# Patient Record
Sex: FEMALE | Race: BLACK OR AFRICAN AMERICAN | Employment: FULL TIME | RURAL
[De-identification: names, ages, dates, MRNs, and addresses within clinical notes are randomized per-mention and may not be internally consistent; named-entity substitution may affect disease eponyms.]

---

## 2017-01-04 ENCOUNTER — OFFICE VISIT (OUTPATIENT)
Dept: FAMILY MEDICINE CLINIC | Age: 37
End: 2017-01-04

## 2017-01-04 VITALS
BODY MASS INDEX: 47.53 KG/M2 | HEIGHT: 64 IN | DIASTOLIC BLOOD PRESSURE: 100 MMHG | TEMPERATURE: 98 F | HEART RATE: 81 BPM | SYSTOLIC BLOOD PRESSURE: 150 MMHG | WEIGHT: 278.4 LBS

## 2017-01-04 DIAGNOSIS — R68.89 INFLUENZA-LIKE SYMPTOMS: ICD-10-CM

## 2017-01-04 DIAGNOSIS — J22 LOWER RESP. TRACT INFECTION: Primary | ICD-10-CM

## 2017-01-04 LAB
QUICKVUE INFLUENZA TEST: NEGATIVE
VALID INTERNAL CONTROL?: YES

## 2017-01-04 RX ORDER — LEVOFLOXACIN 500 MG/1
500 TABLET, FILM COATED ORAL DAILY
Qty: 10 TAB | Refills: 0 | Status: SHIPPED | OUTPATIENT
Start: 2017-01-04 | End: 2017-01-14

## 2017-01-04 RX ORDER — CODEINE PHOSPHATE AND GUAIFENESIN 10; 100 MG/5ML; MG/5ML
SOLUTION ORAL
Qty: 180 ML | Refills: 0 | Status: SHIPPED | OUTPATIENT
Start: 2017-01-04 | End: 2017-08-07 | Stop reason: CLARIF

## 2017-01-04 NOTE — MR AVS SNAPSHOT
Visit Information Date & Time Provider Department Dept. Phone Encounter #  
 1/4/2017  1:45 PM Mariely NguyenEsthela 73 [phone] [id number] Follow-up Instructions Return if symptoms worsen or fail to improve. Upcoming Health Maintenance Date Due DTaP/Tdap/Td series (1 - Tdap) 1/23/2002 PAP AKA CERVICAL CYTOLOGY 1/23/2002 INFLUENZA AGE 9 TO ADULT 8/1/2016 Allergies as of 1/4/2017  Review Complete On: 1/4/2017 By: Mariely Nguyen MD  
  
 Severity Noted Reaction Type Reactions Black Pepper Medium 09/15/2014    Hives, Swelling, Cough Mushroom Combination No.1 Medium 09/15/2014    Shortness of Breath, Swelling, Cough Tomato Medium 09/15/2014    Hives, Swelling, Cough Adhesive Tape-silicones  24/85/0799   Topical Hives Causes burning at site Dilaudid [Hydromorphone (Bulk)]  09/16/2014    Itching Gluten Low 06/13/2013    Other (comments) GI upset Morphine Low 01/17/2011   Side Effect Rash Tramadol Low 01/17/2011   Side Effect Rash Tolerates percocet Current Immunizations  Reviewed on 9/22/2014 Name Date Influenza Vaccine PF 10/1/2014  6:06 PM  
 Pneumococcal Polysaccharide (PPSV-23) 10/1/2014  6:08 PM  
  
 Not reviewed this visit You Were Diagnosed With   
  
 Codes Comments Lower resp. tract infection    -  Primary ICD-10-CM: Lum Nones ICD-9-CM: 519.8 Influenza-like symptoms     ICD-10-CM: R68.89 ICD-9-CM: 780.99 Vitals BP Pulse Temp Height(growth percentile) Weight(growth percentile) BMI  
 (!) 150/100 (BP 1 Location: Left arm, BP Patient Position: Sitting) 81 98 °F (36.7 °C) (Temporal) 5' 4\" (1.626 m) 278 lb 6.4 oz (126.3 kg) 47.79 kg/m2 OB Status Smoking Status Unknown Never Smoker BMI and BSA Data Body Mass Index Body Surface Area 47.79 kg/m 2 2.39 m 2 Preferred Pharmacy Pharmacy Name Phone [facility]/PHARMACY #[id number] [place] [phone] Your Updated Medication List  
  
   
This list is accurate as of: 17  2:26 PM.  Always use your most recent med list.  
  
  
  
  
 Willetta Adas 250-50 mcg/dose diskus inhaler Generic drug:  fluticasone-salmeterol Take 1 Puff by inhalation every twelve (12) hours. albuterol 90 mcg/actuation inhaler Commonly known as:  PROVENTIL HFA, VENTOLIN HFA, PROAIR HFA Take 1-2 puffs by inhalation every four (4) hours as needed for Wheezing. docusate sodium 50 mg capsule Commonly known as:  Lucetta Martinez Take 2 Caps by mouth two (2) times daily as needed for Constipation. DUEXIS 800-26.6 mg Tab Generic drug:  ibuprofen-famotidine Take 1 Tab by mouth three (3) times daily as needed. guaiFENesin-codeine 100-10 mg/5 mL solution Commonly known as:  ROBITUSSIN AC  
1-2 tsp q 4 hours prn cough. (has taken before without difficulty) IMITREX 50 mg tablet Generic drug:  SUMAtriptan Take 50 mg by mouth once as needed for Migraine. levoFLOXacin 500 mg tablet Commonly known as:  Lola Hocking Take 1 Tab by mouth daily for 10 days. LINZESS 290 mcg Cap capsule Generic drug:  linaclotide Take 290 mcg by mouth Daily (before breakfast). LYRICA 50 mg capsule Generic drug:  pregabalin Take 50 mg by mouth two (2) times a day. MULTIVITAMIN PO Take  by mouth. Takes one tab po occasionally. PEPCID 40 mg tablet Generic drug:  famotidine Take 40 mg by mouth nightly. PROTONIX 40 mg tablet Generic drug:  pantoprazole Take 40 mg by mouth two (2) times a day. SINGULAIR 10 mg tablet Generic drug:  montelukast  
Take 10 mg by mouth nightly. Prescriptions Printed Refills  
 guaiFENesin-codeine (ROBITUSSIN AC) 100-10 mg/5 mL solution 0 Si-2 tsp q 4 hours prn cough. (has taken before without difficulty) Class: Print Prescriptions Sent to Pharmacy Refills  
 levoFLOXacin (LEVAQUIN) 500 mg tablet 0 Sig: Take 1 Tab by mouth daily for 10 days. Class: Normal  
 Pharmacy: [facility]/pharmacy #[id number] [facility] [address] [place]  #: [phone] Route: Oral  
  
Follow-up Instructions Return if symptoms worsen or fail to improve. Introducing [facility]! Chhaya Hammond introduces [employer] patient portal. Now you can access parts of your medical record, email your doctor's office, and request medication refills online. 1. In your internet browser, go to [email or web address] 2. Click on the First Time User? Click Here link in the Sign In box. You will see the New Member Sign Up page. 3. Enter your [employer] Access Code exactly as it appears below. You will not need to use this code after youve completed the sign-up process. If you do not sign up before the expiration date, you must request a new code. · [employer] Access Code: [email or web address] Expires: 2/19/2017  3:54 PM 
 
4. Enter the last four digits of your Social Security Number (xxxx) and Date of Birth (mm/dd/yyyy) as indicated and click Submit. You will be taken to the next sign-up page. 5. Create a [employer] ID. This will be your [employer] login ID and cannot be changed, so think of one that is secure and easy to remember. 6. Create a [employer] password. You can change your password at any time. 7. Enter your Password Reset Question and Answer. This can be used at a later time if you forget your password. 8. Enter your e-mail address. You will receive e-mail notification when new information is available in [address] 9. Click Sign Up. You can now view and download portions of your medical record. 10. Click the Download Summary menu link to download a portable copy of your medical information.  
 
If you have questions, please visit the Frequently Asked Questions section of the [employer]. Remember, [employer] is NOT to be used for urgent needs. For medical emergencies, dial 911. Now available from your iPhone and Android! Please provide this summary of care documentation to your next provider. Your primary care clinician is listed as Pilar Suero. Lio GOLDEN. If you have any questions after today's visit, please call [phone].

## 2017-01-04 NOTE — PROGRESS NOTES
Subjective:  Mey Osborn presents with cough congestion she has had for the last 2 weeks cough now productive green sputum. Does have a history of asthma no significant flare has been well controlled on her inhalers. Over the last 2 days has noted fever chills myalgias. Has had a flu shot. No known exposure to the flu  Appetite is decreased taking fluids well. No nausea vomiting diarrhea  Denies any respiratory distress COPD or tobacco use  No family members ill      Problem list reviewed as part of this encounter. Past Medical History   Diagnosis Date    Acquired hypothyroidism      hyperthyroid    Anemia NEC     Arthritis     Asthma     CHF (congestive heart failure) (HCC)     Chronic pain      fibromyalgia    Colitis     GERD (gastroesophageal reflux disease)     Ill-defined condition      ACL repair on L leg     Other unknown and unspecified cause of morbidity or mortality      fibromyalgia    Other unknown and unspecified cause of morbidity or mortality      obesity    Pleural effusion associated with pulmonary infection     Psychiatric problem      anxiety    PUD (peptic ulcer disease)       Medication list reviewed and updated. Review of Systems -as per the above in previous documentation  Objective:  Visit Vitals    BP (!) 150/100 (BP 1 Location: Left arm, BP Patient Position: Sitting)    Pulse 81    Temp 98 °F (36.7 °C) (Temporal)    Ht 5' 4\" (1.626 m)    Wt 278 lb 6.4 oz (126.3 kg)    BMI 47.79 kg/m2     Alert and oriented. No acute distress. HEENT Ears TMs are normal.  Canals are clear. Eyes pupils equal, round, react to light and accommodation. Extraocular movements intact. Nose patent. Mouth and throat is clear. Neck supple full range of motion no thyromegaly. No carotid bruits. No significant lymphadenopathy  Lungs clear to auscultation without wheezes, rales, soft rhonchi scattered.   No respiratory distress accessory muscle use or tachypnea  Heart  RRR,  S1 & S2 are normal intensity. No murmur; no gallop  Abdomen bowel sounds active. No tenderness, guarding, rebound, masses, organomegaly. Back no CVA tenderness. Extremities without clubbing, cyanosis, or edema  Neuro HMF intact. Motor is 5 over 5 and symmetrical.  Deep tendon reflexes +2 equal    Influenza test is negative    Assessment:    ICD-10-CM ICD-9-CM    1. Lower resp. tract infection J22 519.8    2. Influenza-like symptoms R68.89 780.99              Plan:  See orders. Orders Placed This Encounter    levoFLOXacin (LEVAQUIN) 500 mg tablet     Sig: Take 1 Tab by mouth daily for 10 days. Dispense:  10 Tab     Refill:  0    guaiFENesin-codeine (ROBITUSSIN AC) 100-10 mg/5 mL solution     Si-2 tsp q 4 hours prn cough. (has taken before without difficulty)     Dispense:  180 mL     Refill:  0    fluids rest acetaminophen ibuprofen as needed, medications as ordered  Follow up if symptoms worsen, change, fail to improve or any new symptoms develop. There are no Patient Instructions on file for this visit.     (Please note that portions of this note were completed with a voice recognition program. Efforts were made to edit the dictations but occasionally words are mis-transcribed.)

## 2017-02-21 ENCOUNTER — TELEPHONE (OUTPATIENT)
Dept: FAMILY MEDICINE CLINIC | Age: 37
End: 2017-02-21

## 2017-02-21 RX ORDER — IPRATROPIUM BROMIDE 0.5 MG/2.5ML
0.5 SOLUTION RESPIRATORY (INHALATION) AS NEEDED
Qty: 2.5 ML | Refills: 11
Start: 2017-02-21

## 2017-02-21 NOTE — TELEPHONE ENCOUNTER
Spoke with patient. Breathing about same. Last dose of prednisone today. Per Mallory Lubin can call in another medication. Patient uses [facility] pharmacy.

## 2017-02-21 NOTE — TELEPHONE ENCOUNTER
Called and SW patient and she stated that her Chest is now starting to tighten back up again. She was put on Prednisone, Tessalon Pearls and to use her Albuterol Inhaler Q 4 hours. She didn't know if she could just have something else called in to take.

## 2017-02-22 NOTE — TELEPHONE ENCOUNTER
Spoke with Marina feeling a little better . Discussed doing neb treatment and cough medicine prior to going to bed to decrease nocturnal coughing.

## 2017-05-18 ENCOUNTER — OFFICE VISIT (OUTPATIENT)
Dept: FAMILY MEDICINE CLINIC | Age: 37
End: 2017-05-18

## 2017-05-18 VITALS
RESPIRATION RATE: 22 BRPM | HEART RATE: 84 BPM | OXYGEN SATURATION: 98 % | DIASTOLIC BLOOD PRESSURE: 60 MMHG | SYSTOLIC BLOOD PRESSURE: 116 MMHG | TEMPERATURE: 97.8 F | BODY MASS INDEX: 46.92 KG/M2 | WEIGHT: 274.8 LBS | HEIGHT: 64 IN

## 2017-05-18 DIAGNOSIS — J02.9 SORE THROAT: Primary | ICD-10-CM

## 2017-05-18 DIAGNOSIS — G25.81 RESTLESS LEG SYNDROME: ICD-10-CM

## 2017-05-18 DIAGNOSIS — J03.90 TONSILLITIS: ICD-10-CM

## 2017-05-18 LAB
S PYO AG THROAT QL: NEGATIVE
VALID INTERNAL CONTROL?: YES

## 2017-05-18 RX ORDER — METHYLPREDNISOLONE 4 MG/1
TABLET ORAL
Qty: 21 TAB | Refills: 0 | Status: SHIPPED | OUTPATIENT
Start: 2017-05-18 | End: 2017-07-11

## 2017-05-18 RX ORDER — AZITHROMYCIN 250 MG/1
250 TABLET, FILM COATED ORAL DAILY
Qty: 6 TAB | Refills: 0 | Status: SHIPPED | OUTPATIENT
Start: 2017-05-18 | End: 2017-07-11

## 2017-05-18 RX ORDER — PREGABALIN 50 MG/1
50 CAPSULE ORAL DAILY
Qty: 30 CAP | Refills: 2 | Status: SHIPPED | OUTPATIENT
Start: 2017-05-18 | End: 2017-05-18 | Stop reason: CLARIF

## 2017-05-18 NOTE — MR AVS SNAPSHOT
Visit Information Date & Time Provider Department Dept. Phone Encounter #  
 5/18/2017  9:30 AM Csomo Osborn NP [address] [phone] [id number] Follow-up Instructions Return in about 3 months (around 8/18/2017). Upcoming Health Maintenance Date Due DTaP/Tdap/Td series (1 - Tdap) 1/23/2002 PAP AKA CERVICAL CYTOLOGY 1/23/2002 INFLUENZA AGE 9 TO ADULT 8/1/2017 Allergies as of 5/18/2017  Review Complete On: 5/18/2017 By: Sol Jaime RN Severity Noted Reaction Type Reactions Black Pepper Medium 09/15/2014    Hives, Swelling, Cough Mushroom Combination No.1 Medium 09/15/2014    Shortness of Breath, Swelling, Cough Tomato Medium 09/15/2014    Hives, Swelling, Cough Adhesive Tape-silicones  04/80/2378   Topical Hives Causes burning at site Dilaudid [Hydromorphone (Bulk)]  09/16/2014    Itching Gluten Low 06/13/2013    Other (comments) GI upset Morphine Low 01/17/2011   Side Effect Rash Tramadol Low 01/17/2011   Side Effect Rash Tolerates percocet Current Immunizations  Reviewed on 9/22/2014 Name Date Influenza Vaccine PF 10/1/2014  6:06 PM  
 Pneumococcal Polysaccharide (PPSV-23) 10/1/2014  6:08 PM  
  
 Not reviewed this visit You Were Diagnosed With   
  
 Codes Comments Sore throat    -  Primary ICD-10-CM: J02.9 ICD-9-CM: 218 Tonsillitis     ICD-10-CM: J03.90 ICD-9-CM: 258 Vitals BP Pulse Temp Resp Height(growth percentile) 116/60 (BP 1 Location: Left arm, BP Patient Position: Sitting) 84 97.8 °F (36.6 °C) (Temporal) 22 5' 4\" (1.626 m) Weight(growth percentile) SpO2 BMI OB Status Smoking Status 274 lb 12.8 oz (124.6 kg) 98% 47.17 kg/m2 Unknown Never Smoker Vitals History BMI and BSA Data Body Mass Index Body Surface Area  
 47.17 kg/m 2 2.37 m 2 Preferred Pharmacy Pharmacy Name Phone [facility]/PHARMACY #[id number] [place] [phone] Your Updated Medication List  
  
   
This list is accurate as of: 5/18/17 10:57 AM.  Always use your most recent med list.  
  
  
  
  
 Samella Meter 250-50 mcg/dose diskus inhaler Generic drug:  fluticasone-salmeterol Take 1 Puff by inhalation every twelve (12) hours. albuterol 90 mcg/actuation inhaler Commonly known as:  PROVENTIL HFA, VENTOLIN HFA, PROAIR HFA Take 1-2 puffs by inhalation every four (4) hours as needed for Wheezing. azithromycin 250 mg tablet Commonly known as:  [place] Clive Take 1 Tab by mouth daily. 2 today then 1 daily x 4 days  
  
 docusate sodium 50 mg capsule Commonly known as:  Germaine Ehrich Take 2 Caps by mouth two (2) times daily as needed for Constipation. DUEXIS 800-26.6 mg Tab Generic drug:  ibuprofen-famotidine Take 1 Tab by mouth three (3) times daily as needed. guaiFENesin-codeine 100-10 mg/5 mL solution Commonly known as:  ROBITUSSIN AC  
1-2 tsp q 4 hours prn cough. (has taken before without difficulty) IMITREX 50 mg tablet Generic drug:  SUMAtriptan Take 50 mg by mouth once as needed for Migraine. ipratropium 0.02 % nebulizer solution Commonly known as:  ATROVENT  
2.5 mL by Nebulization route as needed for Wheezing. linaclotide 290 mcg Cap capsule Commonly known as:  Chance Ramirez Take 1 Cap by mouth Daily (before breakfast). methylPREDNISolone 4 mg Tab Commonly known as:  MEDROL Take 5 tablets day one, take 4 tablets day two, take 3 tablets day three,take 2 tablets day four, take 1 tablet day five. MULTIVITAMIN PO Take  by mouth. Takes one tab po occasionally. PEPCID 40 mg tablet Generic drug:  famotidine Take 40 mg by mouth nightly. pregabalin 50 mg capsule Commonly known as:  Enedina Page Take 1 Cap by mouth daily. Max Daily Amount: 50 mg. PROTONIX 40 mg tablet Generic drug:  pantoprazole Take 40 mg by mouth two (2) times a day. SINGULAIR 10 mg tablet Generic drug:  montelukast  
Take 10 mg by mouth nightly. Prescriptions Printed Refills  
 pregabalin (LYRICA) 50 mg capsule 2 Sig: Take 1 Cap by mouth daily. Max Daily Amount: 50 mg.  
 Class: Print Route: Oral  
  
Prescriptions Sent to Pharmacy Refills  
 linaclotide (LINZESS) 290 mcg cap capsule 2 Sig: Take 1 Cap by mouth Daily (before breakfast). Class: Normal  
 Pharmacy: [facility]/pharmacy #[id number] Denny Ventura, [address] Ph #: [phone] Route: Oral  
 methylPREDNISolone (MEDROL) 4 mg tab 0 Sig: Take 5 tablets day one, take 4 tablets day two, take 3 tablets day three,take 2 tablets day four, take 1 tablet day five. Class: Normal  
 Pharmacy: [facility]/pharmacy #[id number] Denny Ventura, [address] Ph #: [phone]  
 azithromycin (ZITHROMAX) 250 mg tablet 0 Sig: Take 1 Tab by mouth daily. 2 today then 1 daily x 4 days Class: Normal  
 Pharmacy: [facility]/pharmacy #[id number] Denny Ventura [address] Ph #: [phone] Route: Oral  
  
We Performed the Following AMB POC RAPID STREP A [63867 CPT(R)] Follow-up Instructions Return in about 3 months (around 8/18/2017). Introducing [facility]! [facility] introduces [employer] patient portal. Now you can access parts of your medical record, email your doctor's office, and request medication refills online. 1. In your internet browser, go to [email or web address] 2. Click on the First Time User? Click Here link in the Sign In box. You will see the New Member Sign Up page. 3. Enter your [employer] Access Code exactly as it appears below. You will not need to use this code after youve completed the sign-up process. If you do not sign up before the expiration date, you must request a new code. · [employer] Access Code: [email or web address] Expires: 8/16/2017 10:57 AM 
 
4. Enter the last four digits of your Social Security Number (xxxx) and Date of Birth (mm/dd/yyyy) as indicated and click Submit. You will be taken to the next sign-up page. 5. Create a [employer] ID. This will be your [employer] login ID and cannot be changed, so think of one that is secure and easy to remember. 6. Create a [employer] password. You can change your password at any time. 7. Enter your Password Reset Question and Answer. This can be used at a later time if you forget your password. 8. Enter your e-mail address. You will receive e-mail notification when new information is available in [address] 9. Click Sign Up. You can now view and download portions of your medical record. 10. Click the Download Summary menu link to download a portable copy of your medical information. If you have questions, please visit the Frequently Asked Questions section of the [employer] website. Remember, [employer] is NOT to be used for urgent needs. For medical emergencies, dial 911. Now available from your iPhone and Android! Please provide this summary of care documentation to your next provider. Your primary care clinician is listed as Natty Stringer. If you have any questions after today's visit, please call [phone].

## 2017-05-22 PROBLEM — G25.81 RESTLESS LEG SYNDROME: Status: ACTIVE | Noted: 2017-05-22

## 2017-05-22 RX ORDER — GABAPENTIN 100 MG/1
100 CAPSULE ORAL DAILY
Qty: 30 CAP | Refills: 2 | Status: SHIPPED | OUTPATIENT
Start: 2017-05-22 | End: 2017-08-07 | Stop reason: CLARIF

## 2017-05-22 NOTE — PROGRESS NOTES
Attends [facility] for RN      Addendum to PE  Present during exam  Mouth and throat  Anterior Cervical Lymphadenopathy. Plan : BMI 47.17 discussed follow up for weight loss decrease calorie intake increase activity. Restless leg syndrome responding to gabapentin . Plan to assess for peripheral neuropathy next visit     RTO in 3 months or sooner as needed.      Darron Perez NP-C

## 2017-05-22 NOTE — PROGRESS NOTES
Subjective:     Worthy Apgar is a 40 y.o. female who presents today with the following:  Chief Complaint   Patient presents with    Sore Throat    Cold Symptoms   Colletta Lobe presents today for an acute visit. Daughter ill first with sore throat. Marina sick x 5 days with fever 100.1 x 5 days. Sore throat hurts to swallow. Stuffy nose and head congestion. Trying OTC Robitussin, mucinex and tylenol. Asthma: Using rescue  inhaler daily for wheezing x 5 days     Constipation: Linzess effective with diet and fluid intake. Peripheral neuropathy: primarily bottom of feet. Gabapentin effective able to exercise and increase activity. ROS:  Gen: denies fever, chills, fatigue, weight loss, weight gain  HEENT:denies blurry vision, nasal congestion, sore throat  Resp: denies dypsnea, cough, wheezing  CV: denies chest pain radiating to the jaws or arms, palpitations, lower extremity edema  Abd: denies nausea, vomiting, diarrhea, constipation  Neuro: denies numbness/tingling  Endo: denies polyuria, polydipsia, heat/cold intolerance  Heme: no lymphadenopathy    Allergies   Allergen Reactions    Black Pepper Hives, Swelling and Cough    Mushroom Combination No.1 Shortness of Breath, Swelling and Cough    Tomato Hives, Swelling and Cough    Adhesive Tape-Silicones Hives     Causes burning at site     Dilaudid [Hydromorphone (Bulk)] Itching    Gluten Other (comments)     GI upset    Morphine Rash    Tramadol Rash     Tolerates percocet         Current Outpatient Prescriptions:     linaclotide (LINZESS) 290 mcg cap capsule, Take 1 Cap by mouth Daily (before breakfast). , Disp: 30 Cap, Rfl: 2    methylPREDNISolone (MEDROL) 4 mg tab, Take 5 tablets day one, take 4 tablets day two, take 3 tablets day three,take 2 tablets day four, take 1 tablet day five., Disp: 21 Tab, Rfl: 0    azithromycin (ZITHROMAX) 250 mg tablet, Take 1 Tab by mouth daily.  2 today then 1 daily x 4 days, Disp: 6 Tab, Rfl: 0   ipratropium (ATROVENT) 0.02 % nebulizer solution, 2.5 mL by Nebulization route as needed for Wheezing., Disp: 2.5 mL, Rfl: 11    pantoprazole (PROTONIX) 40 mg tablet, Take 40 mg by mouth two (2) times a day., Disp: , Rfl:     ibuprofen-famotidine (DUEXIS) 800-26.6 mg tab, Take 1 Tab by mouth three (3) times daily as needed. , Disp: , Rfl:     MULTIVITAMIN PO, Take  by mouth. Takes one tab po occasionally. , Disp: , Rfl:     fluticasone-salmeterol (ADVAIR DISKUS) 250-50 mcg/dose diskus inhaler, Take 1 Puff by inhalation every twelve (12) hours. , Disp: , Rfl:     SUMAtriptan (IMITREX) 50 mg tablet, Take 50 mg by mouth once as needed for Migraine. , Disp: , Rfl:     docusate sodium (COLACE) 50 mg capsule, Take 2 Caps by mouth two (2) times daily as needed for Constipation. , Disp: 30 Cap, Rfl: 2    albuterol (PROVENTIL HFA, VENTOLIN HFA) 90 mcg/actuation inhaler, Take 1-2 puffs by inhalation every four (4) hours as needed for Wheezing., Disp: , Rfl:     montelukast (SINGULAIR) 10 mg tablet, Take 10 mg by mouth nightly., Disp: , Rfl:     famotidine (PEPCID) 40 mg tablet, Take 40 mg by mouth nightly., Disp: , Rfl:     guaiFENesin-codeine (ROBITUSSIN AC) 100-10 mg/5 mL solution, 1-2 tsp q 4 hours prn cough. (has taken before without difficulty), Disp: 180 mL, Rfl: 0    Past Medical History:   Diagnosis Date    Acquired hypothyroidism     hyperthyroid    Anemia NEC     Arthritis     Asthma     CHF (congestive heart failure) (HCC)     Chronic pain     fibromyalgia    Colitis     GERD (gastroesophageal reflux disease)     Ill-defined condition     ACL repair on L leg     Other unknown and unspecified cause of morbidity or mortality     fibromyalgia    Other unknown and unspecified cause of morbidity or mortality     obesity    Pleural effusion associated with pulmonary infection     Psychiatric problem     anxiety    PUD (peptic ulcer disease)        Past Surgical History:   Procedure Laterality Date     DELIVERY ONLY      x2, one with classical uterine incision    HX  SECTION      x3 total    HX CHOLECYSTECTOMY      HX HEENT      sinus surgery    HX HERNIA REPAIR      HX ORTHOPAEDIC      ACL repair x2 - twice       History   Smoking Status    Never Smoker   Smokeless Tobacco    Never Used       Social History     Social History    Marital status:      Spouse name: N/A    Number of children: N/A    Years of education: N/A     Social History Main Topics    Smoking status: Never Smoker    Smokeless tobacco: Never Used    Alcohol use No    Drug use: No    Sexual activity: Yes     Partners: Male     Birth control/ protection: None     Other Topics Concern    None     Social History Narrative       Family History   Problem Relation Age of Onset    Diabetes Father     Heart Attack Father      62 smoker    Hypertension Father     Asthma Father     High Cholesterol Father     Stroke Father     Rashes/Skin Problems Father      eczema    Heart Disease Father     Diabetes Brother     Diabetes Sister     Other Sister      ankylosing spondylitis    Hypertension Mother     Asthma Mother     Hypertension Sister     Other Sister      ankylosing spondylitis    Hypertension Brother     Hypertension Maternal Grandmother     Stroke Maternal Grandmother     Heart Disease Maternal Grandfather     Diabetes Paternal Grandmother     Dementia Paternal Grandfather     Asthma Daughter     Asthma Son     Asthma Son     Asthma Son     Asthma Son     Asthma Son          Objective:     Visit Vitals    /60 (BP 1 Location: Left arm, BP Patient Position: Sitting)    Pulse 84    Temp 97.8 °F (36.6 °C) (Temporal)    Resp 22    Ht 5' 4\" (1.626 m)    Wt 274 lb 12.8 oz (124.6 kg)    SpO2 98%    BMI 47.17 kg/m2     Body mass index is 47.17 kg/(m^2). General: Alert and oriented. No acute distress. Well nourished, Obesity  HEENT :  Ears:TMs are normal. Canals are clear. Eyes: pupils equal, round, react to light and accommodation. Extra ocular movements intact. Nose: patent. Mouth and throat erythematous, 3+ tonsils no exudate. Neck:supple full range of motion no thyromegaly. Trachea midline, No carotid bruits. No significant lymphadenopathy  Lungs[de-identified] clear to auscultation without wheezes, rales, or rhonchi. Heart :RRR, S1 & S2 are normal intensity. No murmur; no gallop  Abdomen: bowel sounds active. No tenderness, guarding, rebound, masses, hepatic or spleen enlargement  Back: no CVA tenderness. Extremities: without clubbing, cyanosis, or edema  Pulses: radial and femoral pulses are normal  Neuro: HMF intact. Cranial nerves II through XII grossly normal.  Motor: is 5 over 5 and symmetrical.   Deep tendon reflexes: +2 equal    Results for orders placed or performed in visit on 05/18/17   AMB POC RAPID STREP A   Result Value Ref Range    VALID INTERNAL CONTROL POC Yes     Group A Strep Ag Negative Negative       Results for orders placed or performed in visit on 05/18/17   AMB POC RAPID STREP A   Result Value Ref Range    VALID INTERNAL CONTROL POC Yes     Group A Strep Ag Negative Negative       Assessment/ Plan:     Benjamin Mclean was seen today for sore throat and cold symptoms. Diagnoses and all orders for this visit:    Sore throat  -     AMB POC RAPID STREP A    Tonsillitis    Other orders  -     linaclotide (LINZESS) 290 mcg cap capsule; Take 1 Cap by mouth Daily (before breakfast). -     Discontinue: pregabalin (LYRICA) 50 mg capsule; Take 1 Cap by mouth daily. Max Daily Amount: 50 mg.  -     methylPREDNISolone (MEDROL) 4 mg tab; Take 5 tablets day one, take 4 tablets day two, take 3 tablets day three,take 2 tablets day four, take 1 tablet day five. -     azithromycin (ZITHROMAX) 250 mg tablet; Take 1 Tab by mouth daily. 2 today then 1 daily x 4 days  -     gabapentin (NEURONTIN) 100 mg capsule; Take 1 Cap by mouth daily. 1. Sore throat    2. Tonsillitis    3. Constipation  4. peripheral neuropathy    Orders Placed This Encounter    AMB POC RAPID STREP A    linaclotide (LINZESS) 290 mcg cap capsule     Sig: Take 1 Cap by mouth Daily (before breakfast). Dispense:  30 Cap     Refill:  2    DISCONTD: pregabalin (LYRICA) 50 mg capsule     Sig: Take 1 Cap by mouth daily. Max Daily Amount: 50 mg. Dispense:  30 Cap     Refill:  2    methylPREDNISolone (MEDROL) 4 mg tab     Sig: Take 5 tablets day one, take 4 tablets day two, take 3 tablets day three,take 2 tablets day four, take 1 tablet day five. Dispense:  21 Tab     Refill:  0    azithromycin (ZITHROMAX) 250 mg tablet     Sig: Take 1 Tab by mouth daily. 2 today then 1 daily x 4 days     Dispense:  6 Tab     Refill:  0         Verbal and written instructions (see AVS) provided.  Patient expresses understanding of diagnosis and treatment plan.     NENA Oneil

## 2017-07-11 ENCOUNTER — OFFICE VISIT (OUTPATIENT)
Dept: FAMILY MEDICINE CLINIC | Age: 37
End: 2017-07-11

## 2017-07-11 VITALS
RESPIRATION RATE: 16 BRPM | DIASTOLIC BLOOD PRESSURE: 112 MMHG | OXYGEN SATURATION: 95 % | WEIGHT: 277.2 LBS | BODY MASS INDEX: 47.33 KG/M2 | SYSTOLIC BLOOD PRESSURE: 138 MMHG | HEIGHT: 64 IN | HEART RATE: 88 BPM | TEMPERATURE: 98.4 F

## 2017-07-11 DIAGNOSIS — J02.9 SORE THROAT: ICD-10-CM

## 2017-07-11 DIAGNOSIS — J45.909 ASTHMA, INTRINSIC: ICD-10-CM
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
## 2017-07-11 DIAGNOSIS — I10 ESSENTIAL HYPERTENSION: ICD-10-CM

## 2017-07-11 DIAGNOSIS — E07.9 THYROID CONDITION: ICD-10-CM

## 2017-07-11 DIAGNOSIS — R51.9 ACUTE NONINTRACTABLE HEADACHE, UNSPECIFIED HEADACHE TYPE: Primary | ICD-10-CM

## 2017-07-11 DIAGNOSIS — G43.109 MIGRAINE WITH AURA AND WITHOUT STATUS MIGRAINOSUS, NOT INTRACTABLE: ICD-10-CM

## 2017-07-11 DIAGNOSIS — A77.0 ROCKY MOUNTAIN SPOTTED FEVER: ICD-10-CM

## 2017-07-11 LAB
GLUCOSE POC: 120 MG/DL
S PYO AG THROAT QL: NEGATIVE
VALID INTERNAL CONTROL?: YES

## 2017-07-11 RX ORDER — ZINC GLUCONATE 10 MG
LOZENGE ORAL
COMMUNITY
End: 2019-02-13

## 2017-07-11 RX ORDER — KETOROLAC TROMETHAMINE 30 MG/ML
30 INJECTION, SOLUTION INTRAMUSCULAR; INTRAVENOUS ONCE
Qty: 1 VIAL | Refills: 0
Start: 2017-07-11 | End: 2017-07-11

## 2017-07-11 RX ORDER — HYDROCHLOROTHIAZIDE 12.5 MG/1
12.5 TABLET ORAL DAILY
Qty: 30 TAB | Refills: 0 | Status: SHIPPED | OUTPATIENT
Start: 2017-07-11 | End: 2017-08-17 | Stop reason: DRUGHIGH

## 2017-07-11 RX ORDER — RIZATRIPTAN BENZOATE 5 MG/1
TABLET ORAL
Qty: 20 TAB | Refills: 0 | Status: SHIPPED | OUTPATIENT
Start: 2017-07-11 | End: 2017-08-07 | Stop reason: CLARIF

## 2017-07-11 NOTE — PROGRESS NOTES
Tricia Gallagher is a 40 y.o. female who presents to the office today with the following:  Chief Complaint   Patient presents with    Headache     nausea       HPI  Hx of migraines. HA since Sunday. Today's is worse than usual.  Talking makes her feel nauseated. Goes to bed with HA, wakes up with HA. Feels like \"head about to pop off shoulder\"  Also c/o photophobia, wearing glasses. Has pain center of face, over sinuses, in bilat ears, and back of head/top of neck. Mix of dull ache in front, sharp behind ears. Gets tingling in face also before her migraines. Tried some Excedrin migraine, Tylenol, and hydrocodone (leftover from wisdom teeth) w/o relief. Has current ST. Worse in AM, resolves throughout the day. Thinks likely from vomiting, she associates with her hiatal hernia and GERD. (has been seen by GI- did not want sx). No fever/chills, congestion/mucus, cough. No neck stiffness/RROM. Went to Urgent care ~1mo ago, Rx abx and cleared sinusitis, no such sxs since. Also admits in heat with hernia + asthma has noticed some mild RIVER- improves with inhaler, no chest pain. Also has gained 40 lbs. Thinks probably due to that also. Currently denies sob/wheeze. Did also quit drinking caffeine/sodas 2 weeks ago. Worried bc her mother had HAs last year and ended up with clots. Pt denies any personal hx of blood clots/DVT/PE. Also no head injuries. Previously seen by Dr. Corky Rosado in [place] for migraines. Tried pt on Imitrex which did not work and was expensive. Referred to Neurology and never went. BP elevated. Denies dx of HTN. Was on medications after hospitalization for short term. BP had normalized and was taken off. Admits has been high past few weeks. 169/107 at home recently. Hx thyroid problems in past.  No current meds. Was issue when pregnant, but then also after.   Lab Results   Component Value Date/Time    TSH 0.501 11/21/2016 03:46 PM     Also, pt adds that she pulled a tick off her ~3 weeks ago. Has no idea how long attached. Wonders if could be causing sxs. Would like testing. No current rash, but did note initial rash/swelling to left arm (bite was under arm) that has since fully resolved. No concerns of pregnancy.  had vasectomy. Review of Systems   Constitutional: Positive for malaise/fatigue. Negative for chills and fever. HENT: Positive for ear pain and sore throat. Negative for congestion. Eyes: Positive for photophobia. Negative for blurred vision, double vision, pain, discharge and redness. Respiratory: Negative for cough, shortness of breath and wheezing. Cardiovascular: Negative for chest pain and leg swelling. Gastrointestinal: Positive for diarrhea, nausea and vomiting. Negative for abdominal pain and blood in stool. Genitourinary: Negative. Musculoskeletal: Negative for myalgias. Skin: Negative for itching and rash. Neurological: Positive for tingling (diffuse middle face). Negative for dizziness, tremors, sensory change, speech change and headaches. No head trauma     See HPI. Allergies   Allergen Reactions    Black Pepper Hives, Swelling and Cough    Mushroom Combination No.1 Shortness of Breath, Swelling and Cough    Tomato Hives, Swelling and Cough    Adhesive Tape-Silicones Hives     Causes burning at site     Dilaudid [Hydromorphone (Bulk)] Itching    Gluten Other (comments)     GI upset    Morphine Rash    Tramadol Rash     Tolerates percocet       Current Outpatient Prescriptions   Medication Sig    magnesium 250 mg tab Take  by mouth.  hydroCHLOROthiazide (HYDRODIURIL) 12.5 mg tablet Take 1 Tab by mouth daily.  ketorolac (TORADOL) 30 mg/mL (1 mL) injection 1 mL by IntraVENous route once for 1 dose.  rizatriptan (MAXALT) 5 mg tablet May take 1-2 tablets by mouth x 1 dose. May repeat in 2 hours if needed x 2.    linaclotide (LINZESS) 290 mcg cap capsule Take 1 Cap by mouth Daily (before breakfast).  pantoprazole (PROTONIX) 40 mg tablet Take 40 mg by mouth two (2) times a day.  MULTIVITAMIN PO Take  by mouth. Takes one tab po occasionally.  fluticasone-salmeterol (ADVAIR DISKUS) 250-50 mcg/dose diskus inhaler Take 1 Puff by inhalation every twelve (12) hours.  docusate sodium (COLACE) 50 mg capsule Take 2 Caps by mouth two (2) times daily as needed for Constipation.  montelukast (SINGULAIR) 10 mg tablet Take 10 mg by mouth nightly.  famotidine (PEPCID) 40 mg tablet Take 40 mg by mouth nightly.  gabapentin (NEURONTIN) 100 mg capsule Take 1 Cap by mouth daily.  ipratropium (ATROVENT) 0.02 % nebulizer solution 2.5 mL by Nebulization route as needed for Wheezing.  guaiFENesin-codeine (ROBITUSSIN AC) 100-10 mg/5 mL solution 1-2 tsp q 4 hours prn cough. (has taken before without difficulty)    ibuprofen-famotidine (DUEXIS) 800-26.6 mg tab Take 1 Tab by mouth three (3) times daily as needed.  albuterol (PROVENTIL HFA, VENTOLIN HFA) 90 mcg/actuation inhaler Take 1-2 puffs by inhalation every four (4) hours as needed for Wheezing. No current facility-administered medications for this visit.         Past Medical History:   Diagnosis Date    Acquired hypothyroidism     hyperthyroid    Anemia NEC     Arthritis     Asthma     CHF (congestive heart failure) (HCC)     Chronic pain     fibromyalgia    Colitis     GERD (gastroesophageal reflux disease)     Ill-defined condition     ACL repair on L leg     Other unknown and unspecified cause of morbidity or mortality     fibromyalgia    Other unknown and unspecified cause of morbidity or mortality     obesity    Pleural effusion associated with pulmonary infection     Psychiatric problem     anxiety    PUD (peptic ulcer disease)        Past Surgical History:   Procedure Laterality Date     DELIVERY ONLY      x2, one with classical uterine incision    HX  SECTION      x3 total    HX CHOLECYSTECTOMY      HX HEENT sinus surgery    HX HERNIA REPAIR      HX ORTHOPAEDIC      ACL repair x2 - twice       Social History     Social History    Marital status:      Spouse name: N/A    Number of children: N/A    Years of education: N/A     Social History Main Topics    Smoking status: Never Smoker    Smokeless tobacco: Never Used    Alcohol use No    Drug use: No    Sexual activity: Yes     Partners: Male     Birth control/ protection: None     Other Topics Concern    None     Social History Narrative       Family History   Problem Relation Age of Onset    Diabetes Father     Heart Attack Father      62 smoker    Hypertension Father     Asthma Father     High Cholesterol Father     Stroke Father     Rashes/Skin Problems Father      eczema    Heart Disease Father     Hypertension Mother     Asthma Mother     Hypertension Maternal Grandmother     Stroke Maternal Grandmother     Heart Disease Maternal Grandfather     Diabetes Paternal Grandmother     Dementia Paternal Grandfather     Diabetes Brother     Diabetes Sister     Other Sister      ankylosing spondylitis    Hypertension Sister     Other Sister      ankylosing spondylitis    Hypertension Brother     Asthma Daughter     Asthma Son     Asthma Son     Asthma Son     Asthma Son     Asthma Son          Physical Exam:  Visit Vitals    BP (!) 138/112 (BP 1 Location: Left arm, BP Patient Position: Sitting)    Pulse 88    Temp 98.4 °F (36.9 °C) (Oral)    Resp 16    Ht 5' 4\" (1.626 m)    Wt 277 lb 3.2 oz (125.7 kg)    SpO2 95%    BMI 47.58 kg/m2     Physical Exam   Constitutional: She is oriented to person, place, and time and well-developed, well-nourished, and in no distress. Obese AAF. HENT:   Head: Normocephalic and atraumatic.    Right Ear: External ear and ear canal normal.   Left Ear: Tympanic membrane, external ear and ear canal normal.   Nose: Nose normal. Right sinus exhibits no maxillary sinus tenderness and no frontal sinus tenderness. Left sinus exhibits no maxillary sinus tenderness and no frontal sinus tenderness. Mouth/Throat: Uvula is midline, oropharynx is clear and moist and mucous membranes are normal.   Cerumen right ear, unable to see TM. Small portion left TM visualized but also partially blocked by cerumen, what is seen appears nl. Eyes: Conjunctivae and EOM are normal.   Neck: Normal range of motion. Neck supple. Cardiovascular: Normal rate, regular rhythm, normal heart sounds and intact distal pulses. Pulmonary/Chest: Effort normal and breath sounds normal. No respiratory distress. She has no wheezes. She has no rales. Abdominal: Soft. There is no tenderness. Musculoskeletal: Normal range of motion. She exhibits no edema. Lymphadenopathy:     She has no cervical adenopathy. Neurological: She is alert and oriented to person, place, and time. She has normal motor skills, normal sensation, normal strength, normal reflexes and intact cranial nerves. She displays normal speech. She has a normal Cerebellar Exam. She shows no pronator drift. Gait normal.   Skin: Skin is warm and dry. Psychiatric: Mood and affect normal.   Nursing note and vitals reviewed. Assessment/Plan:    ICD-10-CM ICD-9-CM    1. Acute nonintractable headache, unspecified headache type R51 784.0 CBC WITH AUTOMATED DIFF      AMB POC GLUCOSE BLOOD, BY GLUCOSE MONITORING DEVICE      METABOLIC PANEL, COMPREHENSIVE      ketorolac (TORADOL) 30 mg/mL (1 mL) injection      KETOROLAC TROMETHAMINE INJ  - Tolerated well. Monitored for sxs. Ambulated out of office w/o difficulty. [place] THER/PROPH/DIAG INJECTION, SUBCUT/IM      rizatriptan (MAXALT) 5 mg tablet  Counseled pt on potential medication AEs/interactions. Caution regarding sedation and safety. REFERRAL TO NEUROLOGY   2. Migraine with aura and without status migrainosus, not intractable G43.109 346.00 REFERRAL TO NEUROLOGY   3.  Sore throat J02.9 462 AMB POC RAPID STREP A 4. Essential hypertension I10 401.9 hydroCHLOROthiazide (HYDRODIURIL) 12.5 mg tablet  May increase to 25 mg daily if tolerated well and BP still >140/90. Needs f/u with PCP 2 weeks. Monitor bp and keep log in meantime. 5. Thyroid condition E07.9 246.9 TSH 3RD GENERATION          6. Asthma, intrinsic J45.909 493.10    7. Tick bite, initial encounter W57. XXXA 919.4 LYME AB/WESTERN BLOT REFLEX     E906.4 R RICKETTSII AB IGG W/REFL     Results for orders placed or performed in visit on 07/11/17   AMB POC RAPID STREP A   Result Value Ref Range    VALID INTERNAL CONTROL POC Yes     Group A Strep Ag Negative Negative   AMB POC GLUCOSE BLOOD, BY GLUCOSE MONITORING DEVICE   Result Value Ref Range    Glucose  mg/dL     Pt instructed to seek immediate medical attn/to ER for any severe/worsening or red flag sxs. F/u with Neuro and PCP as planned. Will call with labs and further instruction. May return as needed. Total time spent with the patient today was 60 minutes of which more than 50% was spent face to face with the patient. Follow-up Disposition:  Return in about 2 weeks (around 7/25/2017) for routine f/u PCP.     Donato Bowers PA-C

## 2017-07-11 NOTE — MR AVS SNAPSHOT
Visit Information Date & Time Provider Department Dept. Phone Encounter #  
 7/11/2017  4:30 PM Justin Alan PA-C [address] [id number] Follow-up Instructions Return in about 2 weeks (around 7/25/2017) for routine f/u PCP. Your Appointments 8/17/2017 10:30 AM  
ESTABLISHED PATIENT with Vito Mccarty NP  
[address] ([facility]) Appt Note: 3 mo F/U  
 [address] [address]  
[address] [facility] [address] [address] Upcoming Health Maintenance Date Due DTaP/Tdap/Td series (1 - Tdap) 1/23/2002 PAP AKA CERVICAL CYTOLOGY 1/23/2002 INFLUENZA AGE 9 TO ADULT 8/1/2017 Allergies as of 7/11/2017  Review Complete On: 7/11/2017 By: Justin Alan PA-C Severity Noted Reaction Type Reactions Black Pepper Medium 09/15/2014    Hives, Swelling, Cough Mushroom Combination No.1 Medium 09/15/2014    Shortness of Breath, Swelling, Cough Tomato Medium 09/15/2014    Hives, Swelling, Cough Adhesive Tape-silicones  62/21/8843   Topical Hives Causes burning at site Dilaudid [Hydromorphone (Bulk)]  09/16/2014    Itching Gluten Low 06/13/2013    Other (comments) GI upset Morphine Low 01/17/2011   Side Effect Rash Tramadol Low 01/17/2011   Side Effect Rash Tolerates percocet Current Immunizations  Reviewed on 9/22/2014 Name Date Influenza Vaccine PF 10/1/2014  6:06 PM  
 Pneumococcal Polysaccharide (PPSV-23) 10/1/2014  6:08 PM  
  
 Not reviewed this visit You Were Diagnosed With   
  
 Codes Comments Acute nonintractable headache, unspecified headache type    -  Primary ICD-10-CM: R51 ICD-9-CM: 784.0 Migraine with aura and without status migrainosus, not intractable     ICD-10-CM: G43.109 ICD-9-CM: 346.00 Sore throat     ICD-10-CM: J02.9 ICD-9-CM: 141 Essential hypertension     ICD-10-CM: I10 
ICD-9-CM: 401.9 Thyroid condition     ICD-10-CM: E07.9 ICD-9-CM: 246.9 Asthma, intrinsic     ICD-10-CM: Y79.183 ICD-9-CM: 493.10 Tick bite, initial encounter     ICD-10-CM: W57Raevn Ying ICD-9-CM: 919.4, E906.4 Vitals BP Pulse Temp Resp Height(growth percentile) Weight(growth percentile) (!) 138/112 (BP 1 Location: Left arm, BP Patient Position: Sitting) 88 98.4 °F (36.9 °C) (Oral) 16 5' 4\" (1.626 m) 277 lb 3.2 oz (125.7 kg) SpO2 BMI OB Status Smoking Status 95% 47.58 kg/m2 Unknown Never Smoker Vitals History BMI and BSA Data Body Mass Index Body Surface Area  
 47.58 kg/m 2 2.38 m 2 Preferred Pharmacy Pharmacy Name Phone [facility]Y #[id number], [address] [phone] Your Updated Medication List  
  
   
This list is accurate as of: 7/11/17  6:06 PM.  Always use your most recent med list.  
  
  
  
  
 [place] Silence 250-50 mcg/dose diskus inhaler Generic drug:  fluticasone-salmeterol Take 1 Puff by inhalation every twelve (12) hours. albuterol 90 mcg/actuation inhaler Commonly known as:  PROVENTIL HFA, VENTOLIN HFA, PROAIR HFA Take 1-2 puffs by inhalation every four (4) hours as needed for Wheezing. docusate sodium 50 mg capsule Commonly known as:  Fazal Pian Take 2 Caps by mouth two (2) times daily as needed for Constipation. DUEXIS 800-26.6 mg Tab Generic drug:  ibuprofen-famotidine Take 1 Tab by mouth three (3) times daily as needed. gabapentin 100 mg capsule Commonly known as:  NEURONTIN Take 1 Cap by mouth daily. guaiFENesin-codeine 100-10 mg/5 mL solution Commonly known as:  ROBITUSSIN AC  
1-2 tsp q 4 hours prn cough. (has taken before without difficulty)  
  
 hydroCHLOROthiazide 12.5 mg tablet Commonly known as:  HYDRODIURIL Take 1 Tab by mouth daily. ipratropium 0.02 % nebulizer solution Commonly known as:  ATROVENT  
2.5 mL by Nebulization route as needed for Wheezing.  
  
 ketorolac 30 mg/mL (1 mL) injection Commonly known as:  TORADOL  
1 mL by IntraVENous route once for 1 dose. linaclotide 290 mcg Cap capsule Commonly known as:  Ning Juan Manuel Take 1 Cap by mouth Daily (before breakfast). magnesium 250 mg Tab Take  by mouth. MULTIVITAMIN PO Take  by mouth. Takes one tab po occasionally. PEPCID 40 mg tablet Generic drug:  famotidine Take 40 mg by mouth nightly. PROTONIX 40 mg tablet Generic drug:  pantoprazole Take 40 mg by mouth two (2) times a day. rizatriptan 5 mg tablet Commonly known as:  Julian Echevaria May take 1-2 tablets by mouth x 1 dose. May repeat in 2 hours if needed x 2. SINGULAIR 10 mg tablet Generic drug:  montelukast  
Take 10 mg by mouth nightly. Prescriptions Sent to Pharmacy Refills  
 hydroCHLOROthiazide (HYDRODIURIL) 12.5 mg tablet 0 Sig: Take 1 Tab by mouth daily. Class: Normal  
 Pharmacy: [facility]/pharmacy #[id number] [facility], [address] Ph #: [phone] Route: Oral  
 rizatriptan (MAXALT) 5 mg tablet 0 Sig: May take 1-2 tablets by mouth x 1 dose. May repeat in 2 hours if needed x 2.  
 Class: Normal  
 Pharmacy: [facility]/pharmacy #[id number] [facility], [place] [address] Ph #: [phone] We Performed the Following AMB POC GLUCOSE BLOOD, BY GLUCOSE MONITORING DEVICE [64939 CPT(R)] AMB POC RAPID STREP A [90974 CPT(R)] CBC WITH AUTOMATED DIFF [75369 CPT(R)] KETOROLAC TROMETHAMINE INJ [ HCPCS] LYME AB/WESTERN BLOT REFLEX [id number] CPT(R)] METABOLIC PANEL, COMPREHENSIVE [69827 CPT(R)] IA THER/PROPH/DIAG INJECTION, SUBCUT/IM [id number] CPT(R)] R RICKETTSII AB IGG W/REFL [[id number] Custom] REFERRAL TO NEUROLOGY [QWO34 Custom] TSH 3RD GENERATION [34021 CPT(R)] Follow-up Instructions Return in about 2 weeks (around 7/25/2017) for routine f/u PCP. Referral Information Referral ID Referred By Referred To  
  
 [id number] Blane Ying MD   
   [place] [address] [address] [facility], [address] [facility] Phone: [phone] Fax: [phone] Visits Status Start Date End Date 1 New Request 7/11/17 7/11/18 If your referral has a status of pending review or denied, additional information will be sent to support the outcome of this decision. Introducing [facility] & HEALTH SERVICES! [employer] introduces [employer] patient portal. Now you can access parts of your medical record, email your doctor's office, and request medication refills online. 1. In your internet browser, go to [email or web address] 2. Click on the First Time User? Click Here link in the Sign In box. You will see the New Member Sign Up page. 3. Enter your [employer] Access Code exactly as it appears below. You will not need to use this code after youve completed the sign-up process. If you do not sign up before the expiration date, you must request a new code. · [employer] Access Code: [email or web address] Expires: 8/16/2017 10:57 AM 
 
4. Enter the last four digits of your Social Security Number (xxxx) and Date of Birth (mm/dd/yyyy) as indicated and click Submit. You will be taken to the next sign-up page. 5. Create a [employer] ID. This will be your [employer] login ID and cannot be changed, so think of one that is secure and easy to remember. 6. Create a [employer] password. You can change your password at any time. 7. Enter your Password Reset Question and Answer. This can be used at a later time if you forget your password. 8. Enter your e-mail address. You will receive e-mail notification when new information is available in [address] 9. Click Sign Up. You can now view and download portions of your medical record. 10. Click the Download Summary menu link to download a portable copy of your medical information. If you have questions, please visit the Frequently Asked Questions section of the [employer] website. Remember, [employer] is NOT to be used for urgent needs. For medical emergencies, dial 911. Now available from your iPhone and Android! Please provide this summary of care documentation to your next provider. Lyme Disease Testing Disclaimer:   
 § 34.2-6008.6. (Expires July 1, 2018) Lyme disease testing information disclosure. A. Every licensee or his in-office designee who orders a laboratory test for the presence of Lyme disease shall provide to the patient or his legal representative the following written information: \"ACCORDING TO THE CENTERS FOR DISEASE CONTROL AND PREVENTION, AS OF 2011 LYME DISEASE IS THE SIXTH FASTEST GROWING DISEASE IN THE [place]. YOUR HEALTH CARE PROVIDER HAS ORDERED A LABORATORY TEST FOR THE PRESENCE OF LYME DISEASE FOR YOU. CURRENT LABORATORY TESTING FOR LYME DISEASE CAN BE PROBLEMATIC AND STANDARD LABORATORY TESTS OFTEN RESULT IN FALSE NEGATIVE AND FALSE POSITIVE RESULTS, AND IF DONE TOO EARLY, YOU MAY NOT HAVE PRODUCED ENOUGH ANTIBODIES TO BE CONSIDERED POSITIVE BECAUSE YOUR IMMUNE RESPONSE REQUIRES TIME TO DEVELOP ANTIBODIES. IF YOU ARE TESTED FOR LYME DISEASE, AND THE RESULTS ARE NEGATIVE, THIS DOES NOT NECESSARILY MEAN YOU DO NOT HAVE LYME DISEASE. IF YOU CONTINUE TO EXPERIENCE SYMPTOMS, YOU SHOULD CONTACT YOUR HEALTH CARE PROVIDER AND INQUIRE ABOUT THE APPROPRIATENESS OF RETESTING OR ADDITIONAL TREATMENT. \"  
B. Licensees shall be immune from civil liability for the provision of the written information required by this section absent gross negligence or willful misconduct. Your primary care clinician is listed as Beba Doherty. If you have any questions after today's visit, please call [phone].

## 2017-07-13 ENCOUNTER — TELEPHONE (OUTPATIENT)
Dept: FAMILY MEDICINE CLINIC | Age: 37
End: 2017-07-13

## 2017-07-13 NOTE — TELEPHONE ENCOUNTER
Pt calls back for appointment info again.  She has court at 2 pm. Pt gives verbal permission to leave a detailed message

## 2017-07-14 ENCOUNTER — OFFICE VISIT (OUTPATIENT)
Dept: FAMILY MEDICINE CLINIC | Age: 37
End: 2017-07-14

## 2017-07-14 ENCOUNTER — DOCUMENTATION ONLY (OUTPATIENT)
Dept: FAMILY MEDICINE CLINIC | Age: 37
End: 2017-07-14

## 2017-07-14 VITALS
TEMPERATURE: 98.3 F | BODY MASS INDEX: 47.19 KG/M2 | RESPIRATION RATE: 16 BRPM | SYSTOLIC BLOOD PRESSURE: 145 MMHG | DIASTOLIC BLOOD PRESSURE: 92 MMHG | OXYGEN SATURATION: 98 % | HEART RATE: 84 BPM | WEIGHT: 276.4 LBS | HEIGHT: 64 IN

## 2017-07-14 DIAGNOSIS — R10.11 RUQ ABDOMINAL PAIN: ICD-10-CM

## 2017-07-14 DIAGNOSIS — R11.2 NON-INTRACTABLE VOMITING WITH NAUSEA, UNSPECIFIED VOMITING TYPE: ICD-10-CM

## 2017-07-14 DIAGNOSIS — H53.143 PHOTOPHOBIA OF BOTH EYES: ICD-10-CM

## 2017-07-14 DIAGNOSIS — I10 ESSENTIAL HYPERTENSION: ICD-10-CM

## 2017-07-14 DIAGNOSIS — K92.1 BLOOD IN STOOL: ICD-10-CM

## 2017-07-14 DIAGNOSIS — G43.119 INTRACTABLE MIGRAINE WITH AURA WITHOUT STATUS MIGRAINOSUS: Primary | ICD-10-CM

## 2017-07-14 DIAGNOSIS — R10.816 EPIGASTRIC ABDOMINAL TENDERNESS WITHOUT REBOUND TENDERNESS: ICD-10-CM

## 2017-07-14 RX ORDER — ONDANSETRON 4 MG/1
4 TABLET, ORALLY DISINTEGRATING ORAL
Qty: 15 TAB | Refills: 0 | Status: SHIPPED | OUTPATIENT
Start: 2017-07-14 | End: 2017-07-27

## 2017-07-14 RX ORDER — KETOROLAC TROMETHAMINE 30 MG/ML
30 INJECTION, SOLUTION INTRAMUSCULAR; INTRAVENOUS ONCE
Qty: 1 VIAL | Refills: 0
Start: 2017-07-14 | End: 2017-07-14

## 2017-07-14 RX ORDER — BUTALBITAL, ACETAMINOPHEN, CAFFEINE AND CODEINE PHOSPHATE 50; 325; 40; 30 MG/1; MG/1; MG/1; MG/1
1 CAPSULE ORAL
Qty: 15 CAP | Refills: 0 | Status: SHIPPED | OUTPATIENT
Start: 2017-07-14 | End: 2017-07-27

## 2017-07-14 NOTE — MR AVS SNAPSHOT
Visit Information Date & Time Provider Department Dept. Phone Encounter #  
 7/14/2017 10:00 AM Kathya Sheldon PA-C [address] [id number] Your Appointments 8/7/2017 11:30 AM  
New Patient with Gloria Knutson MD  
[facility] Neurology [facility] [address]) Appt Note: np headaches. ..mws 7/13/17  
 [address] [facility] [address] [place]  
  
   
 [address] [facility] [address] [address] 8/17/2017 10:30 AM  
ESTABLISHED PATIENT with Margret Rice, NP  
[address] ([address]) Appt Note: 3 mo F/U  
 [address] [address]  
[address] [facility] [address] [address] Upcoming Health Maintenance Date Due DTaP/Tdap/Td series (1 - Tdap) 1/23/2002 PAP AKA CERVICAL CYTOLOGY 1/23/2002 INFLUENZA AGE 9 TO ADULT 8/1/2017 Allergies as of 7/14/2017  Review Complete On: 7/14/2017 By: Kathya Sheldon PA-C Severity Noted Reaction Type Reactions Black Pepper Medium 09/15/2014    Hives, Swelling, Cough Mushroom Combination No.1 Medium 09/15/2014    Shortness of Breath, Swelling, Cough Tomato Medium 09/15/2014    Hives, Swelling, Cough Adhesive Tape-silicones  25/46/6395   Topical Hives Causes burning at site Dilaudid [Hydromorphone (Bulk)]  09/16/2014    Itching Gluten Low 06/13/2013    Other (comments) GI upset Morphine Low 01/17/2011   Side Effect Rash Tramadol Low 01/17/2011   Side Effect Rash Tolerates percocet Current Immunizations  Reviewed on 9/22/2014 Name Date Influenza Vaccine PF 10/1/2014  6:06 PM  
 Pneumococcal Polysaccharide (PPSV-23) 10/1/2014  6:08 PM  
  
 Not reviewed this visit You Were Diagnosed With   
  
 Codes Comments Intractable migraine with aura without status migrainosus    -  Primary ICD-10-CM: C24.352 ICD-9-CM: 346.01   
 Essential hypertension     ICD-10-CM: I10 
ICD-9-CM: 401.9 RUQ abdominal pain     ICD-10-CM: R10.11 ICD-9-CM: 789.01 Epigastric abdominal tenderness without rebound tenderness     ICD-10-CM: R10.816 ICD-9-CM: 789.66 Non-intractable vomiting with nausea, unspecified vomiting type     ICD-10-CM: R11.2 ICD-9-CM: 787.01 Photophobia of both eyes     ICD-10-CM: H53.143 ICD-9-CM: 368.13 Vitals BP Pulse Temp Resp Height(growth percentile) Weight(growth percentile) (!) 145/92 (BP 1 Location: Right arm, BP Patient Position: Sitting) 84 98.3 °F (36.8 °C) (Oral) 16 5' 4\" (1.626 m) 276 lb 6.4 oz (125.4 kg) LMP SpO2 BMI OB Status Smoking Status 05/17/2017 98% 47.44 kg/m2 Having regular periods Never Smoker Vitals History BMI and BSA Data Body Mass Index Body Surface Area  
 47.44 kg/m 2 2.38 m 2 Preferred Pharmacy Pharmacy Name Phone [id number] [place], [address] [phone] Your Updated Medication List  
  
   
This list is accurate as of: 7/14/17 11:33 AM.  Always use your most recent med list.  
  
  
  
  
 ADVAIR DISKUS 250-50 mcg/dose diskus inhaler Generic drug:  fluticasone-salmeterol Take 1 Puff by inhalation every twelve (12) hours. albuterol 90 mcg/actuation inhaler Commonly known as:  PROVENTIL HFA, VENTOLIN HFA, PROAIR HFA Take 1-2 puffs by inhalation every four (4) hours as needed for Wheezing. codeine-butalbital-acetaminophen-caffeine -16-30 mg per capsule Commonly known as:  FIORICET WITH CODEINE Take 1 Cap by mouth every six (6) hours as needed for Headache or Migraine. Max Daily Amount: 4 Caps. docusate sodium 50 mg capsule Commonly known as:  Margaretha Harrier Take 2 Caps by mouth two (2) times daily as needed for Constipation. DUEXIS 800-26.6 mg Tab Generic drug:  ibuprofen-famotidine Take 1 Tab by mouth three (3) times daily as needed. gabapentin 100 mg capsule Commonly known as:  NEURONTIN Take 1 Cap by mouth daily. guaiFENesin-codeine 100-10 mg/5 mL solution Commonly known as:  ROBITUSSIN AC  
1-2 tsp q 4 hours prn cough. (has taken before without difficulty)  
  
 hydroCHLOROthiazide 12.5 mg tablet Commonly known as:  HYDRODIURIL Take 1 Tab by mouth daily. ipratropium 0.02 % nebulizer solution Commonly known as:  ATROVENT  
2.5 mL by Nebulization route as needed for Wheezing.  
  
 ketorolac 30 mg/mL (1 mL) injection Commonly known as:  TORADOL  
1 mL by IntraVENous route once for 1 dose. linaclotide 290 mcg Cap capsule Commonly known as:  Deborah Parma Take 1 Cap by mouth Daily (before breakfast). magnesium 250 mg Tab Take  by mouth. MULTIVITAMIN PO Take  by mouth. Takes one tab po occasionally. ondansetron 4 mg disintegrating tablet Commonly known as:  ZOFRAN ODT Take 1 Tab by mouth every eight (8) hours as needed for Nausea. PEPCID 40 mg tablet Generic drug:  famotidine Take 40 mg by mouth nightly. PROTONIX 40 mg tablet Generic drug:  pantoprazole Take 40 mg by mouth two (2) times a day. rizatriptan 5 mg tablet Commonly known as:  Alric Dorchester May take 1-2 tablets by mouth x 1 dose. May repeat in 2 hours if needed x 2. SINGULAIR 10 mg tablet Generic drug:  montelukast  
Take 10 mg by mouth nightly. Prescriptions Printed Refills  
 codeine-butalbital-acetaminophen-caffeine (FIORICET WITH CODEINE) -43-30 mg per capsule 0 Sig: Take 1 Cap by mouth every six (6) hours as needed for Headache or Migraine. Max Daily Amount: 4 Caps. Class: Print Route: Oral  
  
Prescriptions Sent to Pharmacy Refills  
 ondansetron (ZOFRAN ODT) 4 mg disintegrating tablet 0 Sig: Take 1 Tab by mouth every eight (8) hours as needed for Nausea.   
 Class: Normal  
 Pharmacy: [facility]/pharmacy #[id number] - Shanna Hollis [address]  #: [phone] Route: Oral  
  
We Performed the Following KETOROLAC TROMETHAMINE INJ [ \[facility] [place] [facility]\""] DC THER/PROPH/DIAG INJECTION, SUBCUT/IM [id number] CPT(R)] To-Do List   
 07/14/2017 Imaging:  CT HEAD WO CONT Referral Information Referral ID Referred By Referred To  
  
 [id number] Shonda DUMONT Not Available Visits Status Start Date End Date 1 New Request 7/14/17 7/14/18 If your referral has a status of pending review or denied, additional information will be sent to support the outcome of this decision. Introducing [facility] & HEALTH SERVICES! [employer] introduces [employer] patient portal. Now you can access parts of your medical record, email your doctor's office, and request medication refills online. 1. In your internet browser, go to [email or web address] 2. Click on the First Time User? Click Here link in the Sign In box. You will see the New Member Sign Up page. 3. Enter your [employer] Access Code exactly as it appears below. You will not need to use this code after youve completed the sign-up process. If you do not sign up before the expiration date, you must request a new code. · [employer] Access Code: [email or web address] Expires: 8/16/2017 10:57 AM 
 
4. Enter the last four digits of your Social Security Number (xxxx) and Date of Birth (mm/dd/yyyy) as indicated and click Submit. You will be taken to the next sign-up page. 5. Create a PharmMDt ID. This will be your [employer] login ID and cannot be changed, so think of one that is secure and easy to remember. 6. Create a [employer] password. You can change your password at any time. 7. Enter your Password Reset Question and Answer. This can be used at a later time if you forget your password. 8. Enter your e-mail address. You will receive e-mail notification when new information is available in [address] 9. Click Sign Up.  You can now view and download portions of your medical record. 10. Click the Download Summary menu link to download a portable copy of your medical information. If you have questions, please visit the Frequently Asked Questions section of the [employer] website. Remember, [employer] is NOT to be used for urgent needs. For medical emergencies, dial 911. Now available from your iPhone and Android! Please provide this summary of care documentation to your next provider. Your primary care clinician is listed as Monica Pérez. If you have any questions after today's visit, please call [phone].

## 2017-07-14 NOTE — PATIENT INSTRUCTIONS
Abdominal Pain: Care Instructions  Your Care Instructions    Abdominal pain has many possible causes. Some aren't serious and get better on their own in a few days. Others need more testing and treatment. If your pain continues or gets worse, you need to be rechecked and may need more tests to find out what is wrong. You may need surgery to correct the problem. Don't ignore new symptoms, such as fever, nausea and vomiting, urination problems, pain that gets worse, and dizziness. These may be signs of a more serious problem. Your doctor may have recommended a follow-up visit in the next 8 to 12 hours. If you are not getting better, you may need more tests or treatment. The doctor has checked you carefully, but problems can develop later. If you notice any problems or new symptoms, get medical treatment right away. Follow-up care is a key part of your treatment and safety. Be sure to make and go to all appointments, and call your doctor if you are having problems. It's also a good idea to know your test results and keep a list of the medicines you take. How can you care for yourself at home? · Rest until you feel better. · To prevent dehydration, drink plenty of fluids, enough so that your urine is light yellow or clear like water. Choose water and other caffeine-free clear liquids until you feel better. If you have kidney, heart, or liver disease and have to limit fluids, talk with your doctor before you increase the amount of fluids you drink. · If your stomach is upset, eat mild foods, such as rice, dry toast or crackers, bananas, and applesauce. Try eating several small meals instead of two or three large ones. · Wait until 48 hours after all symptoms have gone away before you have spicy foods, alcohol, and drinks that contain caffeine. · Do not eat foods that are high in fat. · Avoid anti-inflammatory medicines such as aspirin, ibuprofen (Advil, Motrin), and naproxen (Aleve).  These can cause stomach upset. Talk to your doctor if you take daily aspirin for another health problem. When should you call for help? Call 911 anytime you think you may need emergency care. For example, call if:  · You passed out (lost consciousness). · You pass maroon or very bloody stools. · You vomit blood or what looks like coffee grounds. · You have new, severe belly pain. Call your doctor now or seek immediate medical care if:  · Your pain gets worse, especially if it becomes focused in one area of your belly. · You have a new or higher fever. · Your stools are black and look like tar, or they have streaks of blood. · You have unexpected vaginal bleeding. · You have symptoms of a urinary tract infection. These may include:  ¨ Pain when you urinate. ¨ Urinating more often than usual.  ¨ Blood in your urine. · You are dizzy or lightheaded, or you feel like you may faint. Watch closely for changes in your health, and be sure to contact your doctor if:  · You are not getting better after 1 day (24 hours). Where can you learn more? Go to http://moeMediaXstreamantonio.info/. Enter T947 in the search box to learn more about \"Abdominal Pain: Care Instructions. \"  Current as of: March 20, 2017  Content Version: 11.3  © 0316-5063 [employer]. Care instructions adapted under license by [employer] (which disclaims liability or warranty for this information). If you have questions about a medical condition or this instruction, always ask your healthcare professional. [place] [address] any warranty or liability for your use of this information. High Blood Pressure: Care Instructions  Your Care Instructions  If your blood pressure is usually above 140/90, you have high blood pressure, or hypertension. That means the top number is 140 or higher or the bottom number is 90 or higher, or both.   Despite what a lot of people think, high blood pressure usually doesn't cause headaches or make you feel dizzy or lightheaded. It usually has no symptoms. But it does increase your risk for heart attack, stroke, and kidney or eye damage. The higher your blood pressure, the more your risk increases. Your doctor will give you a goal for your blood pressure. Your goal will be based on your health and your age. An example of a goal is to keep your blood pressure below 140/90. Lifestyle changes, such as eating healthy and being active, are always important to help lower blood pressure. You might also take medicine to reach your blood pressure goal.  Follow-up care is a key part of your treatment and safety. Be sure to make and go to all appointments, and call your doctor if you are having problems. It's also a good idea to know your test results and keep a list of the medicines you take. How can you care for yourself at home? Medical treatment  · If you stop taking your medicine, your blood pressure will go back up. You may take one or more types of medicine to lower your blood pressure. Be safe with medicines. Take your medicine exactly as prescribed. Call your doctor if you think you are having a problem with your medicine. · Talk to your doctor before you start taking aspirin every day. Aspirin can help certain people lower their risk of a heart attack or stroke. But taking aspirin isn't right for everyone, because it can cause serious bleeding. · See your doctor regularly. You may need to see the doctor more often at first or until your blood pressure comes down. · If you are taking blood pressure medicine, talk to your doctor before you take decongestants or anti-inflammatory medicine, such as ibuprofen. Some of these medicines can raise blood pressure. · Learn how to check your blood pressure at home. Lifestyle changes  · Stay at a healthy weight. This is especially important if you put on weight around the waist. Losing even 10 pounds can help you lower your blood pressure.   · If your doctor recommends it, get more exercise. Walking is a good choice. Bit by bit, increase the amount you walk every day. Try for at least 30 minutes on most days of the week. You also may want to swim, bike, or do other activities. · Avoid or limit alcohol. Talk to your doctor about whether you can drink any alcohol. · Try to limit how much sodium you eat to less than 2,300 milligrams (mg) a day. Your doctor may ask you to try to eat less than 1,500 mg a day. · Eat plenty of fruits (such as bananas and oranges), vegetables, legumes, whole grains, and low-fat dairy products. · Lower the amount of saturated fat in your diet. Saturated fat is found in animal products such as milk, cheese, and meat. Limiting these foods may help you lose weight and also lower your risk for heart disease. · Do not smoke. Smoking increases your risk for heart attack and stroke. If you need help quitting, talk to your doctor about stop-smoking programs and medicines. These can increase your chances of quitting for good. When should you call for help? Call 911 anytime you think you may need emergency care. This may mean having symptoms that suggest that your blood pressure is causing a serious heart or blood vessel problem. Your blood pressure may be over 180/110. For example, call 911 if:  · You have symptoms of a heart attack. These may include:  ¨ Chest pain or pressure, or a strange feeling in the chest.  ¨ Sweating. ¨ Shortness of breath. ¨ Nausea or vomiting. ¨ Pain, pressure, or a strange feeling in the back, neck, jaw, or upper belly or in one or both shoulders or arms. ¨ Lightheadedness or sudden weakness. ¨ A fast or irregular heartbeat. · You have symptoms of a stroke. These may include:  ¨ Sudden numbness, tingling, weakness, or loss of movement in your face, arm, or leg, especially on only one side of your body. ¨ Sudden vision changes. ¨ Sudden trouble speaking.   ¨ Sudden confusion or trouble understanding simple statements. ¨ Sudden problems with walking or balance. ¨ A sudden, severe headache that is different from past headaches. · You have severe back or belly pain. Do not wait until your blood pressure comes down on its own. Get help right away. Call your doctor now or seek immediate care if:  · Your blood pressure is much higher than normal (such as 180/110 or higher), but you don't have symptoms. · You think high blood pressure is causing symptoms, such as:  ¨ Severe headache. ¨ Blurry vision. Watch closely for changes in your health, and be sure to contact your doctor if:  · Your blood pressure measures 140/90 or higher at least 2 times. That means the top number is 140 or higher or the bottom number is 90 or higher, or both. · You think you may be having side effects from your blood pressure medicine. · Your blood pressure is usually normal, but it goes above normal at least 2 times. Where can you learn more? Go to http://moe-antonio.info/. Enter C535 in the search box to learn more about \"High Blood Pressure: Care Instructions. \"  Current as of: August 8, 2016  Content Version: 11.3  © 3358-1225 [employer]. Care instructions adapted under license by [employer] (which disclaims liability or warranty for this information). If you have questions about a medical condition or this instruction, always ask your healthcare professional. [place] [address] any warranty or liability for your use of this information. Migraine Headache: Care Instructions  Your Care Instructions  Migraines are painful, throbbing headaches that often start on one side of the head. They may cause nausea and vomiting and make you sensitive to light, sound, or smell. Without treatment, migraines can last from 4 hours to a few days. Medicines can help prevent migraines or stop them after they have started.  Your doctor can help you find which ones work best for you.  Follow-up care is a key part of your treatment and safety. Be sure to make and go to all appointments, and call your doctor if you are having problems. It's also a good idea to know your test results and keep a list of the medicines you take. How can you care for yourself at home? · Do not drive if you have taken a prescription pain medicine. · Rest in a quiet, dark room until your headache is gone. Close your eyes, and try to relax or go to sleep. Don't watch TV or read. · Put a cold, moist cloth or cold pack on the painful area for 10 to 20 minutes at a time. Put a thin cloth between the cold pack and your skin. · Use a warm, moist towel or a heating pad set on low to relax tight shoulder and neck muscles. · Have someone gently massage your neck and shoulders. · Take your medicines exactly as prescribed. Call your doctor if you think you are having a problem with your medicine. You will get more details on the specific medicines your doctor prescribes. · Be careful not to take pain medicine more often than the instructions allow. You could get worse or more frequent headaches when the medicine wears off. To prevent migraines  · Keep a headache diary so you can figure out what triggers your headaches. Avoiding triggers may help you prevent headaches. Record when each headache began, how long it lasted, and what the pain was like. (Was it throbbing, aching, stabbing, or dull?) Write down any other symptoms you had with the headache, such as nausea, flashing lights or dark spots, or sensitivity to bright light or loud noise. Note if the headache occurred near your period. List anything that might have triggered the headache. Triggers may include certain foods (chocolate, cheese, wine) or odors, smoke, bright light, stress, or lack of sleep. · If your doctor has prescribed medicine for your migraines, take it as directed.  You may have medicine that you take only when you get a migraine and medicine that you take all the time to help prevent migraines. ¨ If your doctor has prescribed medicine for when you get a headache, take it at the first sign of a migraine, unless your doctor has given you other instructions. ¨ If your doctor has prescribed medicine to prevent migraines, take it exactly as prescribed. Call your doctor if you think you are having a problem with your medicine. · Find healthy ways to deal with stress. Migraines are most common during or right after stressful times. Take time to relax before and after you do something that has caused a migraine in the past.  · Try to keep your muscles relaxed by keeping good posture. Check your jaw, face, neck, and shoulder muscles for tension. Try to relax them. When you sit at a desk, change positions often. And make sure to stretch for 30 seconds each hour. · Get plenty of sleep and exercise. · Eat meals on a regular schedule. Avoid foods and drinks that often trigger migraines. These include chocolate, alcohol (especially red wine and port), aspartame, monosodium glutamate (MSG), and some additives found in foods (such as hot dogs, wallace, cold cuts, aged cheeses, and pickled foods). · Limit caffeine. Don't drink too much coffee, tea, or soda. But don't quit caffeine suddenly. That can also give you migraines. · Do not smoke or allow others to smoke around you. If you need help quitting, talk to your doctor about stop-smoking programs and medicines. These can increase your chances of quitting for good. · If you are taking birth control pills or hormone therapy, talk to your doctor about whether they are triggering your migraines. When should you call for help? Call 911 anytime you think you may need emergency care. For example, call if:  · You have signs of a stroke. These may include:  ¨ Sudden numbness, paralysis, or weakness in your face, arm, or leg, especially on only one side of your body. ¨ Sudden vision changes.   ¨ Sudden trouble speaking. ¨ Sudden confusion or trouble understanding simple statements. ¨ Sudden problems with walking or balance. ¨ A sudden, severe headache that is different from past headaches. Call your doctor now or seek immediate medical care if:  · You have new or worse nausea and vomiting. · You have a new or higher fever. · Your headache gets much worse. Watch closely for changes in your health, and be sure to contact your doctor if:  · You are not getting better after 2 days (48 hours). Where can you learn more? Go to http://moe-antonio.info/. Enter V550 in the search box to learn more about \"Migraine Headache: Care Instructions. \"  Current as of: October 14, 2016  Content Version: 11.3  © 4742-4613 [employer]. Care instructions adapted under license by [employer] (which disclaims liability or warranty for this information). If you have questions about a medical condition or this instruction, always ask your healthcare professional. [place] [address] any warranty or liability for your use of this information. Deciding About Taking Medicine to Prevent Migraines  What are migraines? Migraines are painful, throbbing headaches. They can last from 4 to 72 hours. They often occur on only one side of your head. But you may feel them on both sides. The pain may keep you from doing your daily activities. You may take a daily medicine if you get bad migraines often. This can help prevent them. What are key points about this decision? · Medicines to prevent migraines may not stop them every time. But if you take them daily, you can reduce how many migraines you get by more than half. They can also reduce how long migraines last. And your symptoms may not be as bad. · Medicines that prevent migraines may cause side effects. You may have sleep and memory problems, upset stomach, dry mouth, or constipation.  Some of these side effects may last for as long as you take the medicine. Or they may go away within a few weeks. Why might you choose to take medicine to prevent migraines? · You are willing to take medicine daily if it will help your symptoms. · You don't think the side effects of the medicine could be as bad as your migraine symptoms. · Your migraines get in the way of your work. Or they harm your relationships with friends and family. · Benefits of medicine include fewer or no migraines. And your migraines may not last as long or feel as bad. Why might you choose not to take medicine to prevent migraines? · You want to avoid the side effects of the medicine. · You don't want to take medicine every day. · Your migraines are not affecting your work and relationships. · If your symptoms don't improve with home treatment and other medicines, you can decide later to take medicine every day to help prevent migraines. Your decision  Thinking about the facts and your feelings can help you make a decision that is right for you. Be sure you understand the benefits and risks of your options, and think about what else you need to do before you make the decision. Where can you learn more? Go to http://moe-antonio.info/. Enter V784 in the search box to learn more about \"Deciding About Taking Medicine to Prevent Migraines. \"  Current as of: October 14, 2016  Content Version: 11.3  © 8056-6484 [employer]. Care instructions adapted under license by [employer] (which disclaims liability or warranty for this information). If you have questions about a medical condition or this instruction, always ask your healthcare professional. [place] [address] any warranty or liability for your use of this information. Recurring Migraine Headache: Care Instructions  Your Care Instructions  Migraines are painful, throbbing headaches. They often start on one side of the head.  They may cause nausea and vomiting and make you sensitive to light, sound, or smell. Some people may have only a few migraines throughout life. Others have them as often as several times a month. The goal of treatment is to reduce the number of migraines you have and relieve your symptoms. Even with treatment, you may continue to have migraines. You play an important role in dealing with your headaches. Work on avoiding things that seem to trigger your migraines. When you feel a headache coming on, act quickly to stop it before it gets worse. Follow-up care is a key part of your treatment and safety. Be sure to make and go to all appointments, and call your doctor if you are having problems. It's also a good idea to know your test results and keep a list of the medicines you take. How can you care for yourself at home? · Do not drive if you have taken a prescription pain medicine. · Rest in a quiet, dark room until your headache is gone. Close your eyes and try to relax or go to sleep. Do not watch TV or read. · Put a cold, moist cloth or cold pack on the painful area for 10 to 20 minutes at a time. Put a thin cloth between the cold pack and your skin. · Have someone gently massage your neck and shoulders. · Take your medicines exactly as prescribed. Call your doctor if you think you are having a problem with your medicine. You will get more details on the specific medicines your doctor prescribes. To prevent migraines  · Keep a headache diary so you can figure out what triggers your headaches. Avoiding triggers may help you prevent headaches. Record when each headache began, how long it lasted, and what the pain was like. Use words like throbbing, aching, stabbing, or dull. Write down any other symptoms you had with the headache. These may include nausea, flashing lights or dark spots, or sensitivity to bright light or loud noise. Note if the headache occurred near your period. List anything that might have triggered the headache. Triggers may include certain foods (chocolate, cheese, wine) or odors, smoke, bright light, stress, or lack of sleep. · If your doctor has prescribed medicine for your migraines, take it as directed. You may have medicine that you take only when you get a migraine and medicine that you take all the time to help prevent migraines. ¨ If your doctor has prescribed medicine for when you get a headache, take it at the first sign of a migraine, unless your doctor has given you other instructions. ¨ If your doctor has prescribed medicine to prevent migraines, take it exactly as prescribed. Call your doctor if you think you are having a problem with your medicine. · Find healthy ways to deal with stress. Migraines are most common during or right after stressful times. Take time to relax before and after you do something that has caused a migraine in the past.  · Try to keep your muscles relaxed by keeping good posture. Check your jaw, face, neck, and shoulder muscles for tension. Try to relax them. When sitting at a desk, change positions often. Stretch for 30 seconds each hour. · Get regular sleep and exercise. · Eat regular meals, and avoid foods and drinks that often trigger migraines. These include chocolate and alcohol, especially red wine and port. Chemicals used in food, such as aspartame and monosodium glutamate (MSG), also can trigger migraines. So can some food additives, such as those found in hot dogs, wallace, cold cuts, aged cheeses, and pickled foods. · Limit caffeine by not drinking too much coffee, tea, or soda. Do not quit caffeine suddenly, because that can also give you migraines. · Do not smoke or allow others to smoke around you. If you need help quitting, talk to your doctor about stop-smoking programs and medicines. These can increase your chances of quitting for good.   · If you are taking birth control pills or hormone therapy, talk to your doctor about whether they are triggering your migraines. When should you call for help? Call 911 anytime you think you may need emergency care. For example, call if:  · You have symptoms of a stroke. These may include:  ¨ Sudden numbness, tingling, weakness, or loss of movement in your face, arm, or leg, especially on only one side of your body. ¨ Sudden vision changes. ¨ Sudden trouble speaking. ¨ Sudden confusion or trouble understanding simple statements. ¨ Sudden problems with walking or balance. ¨ A sudden, severe headache that is different from past headaches. Call your doctor now or seek immediate medical care if:  · You develop a fever and a stiff neck. · You have new nausea and vomiting, or you cannot keep down food or liquids. Watch closely for changes in your health, and be sure to contact your doctor if:  · You have a headache that does not get better within 1 or 2 days. · Your headaches get worse or happen more often. Where can you learn more? Go to http://moe-antonio.info/. Enter V975 in the search box to learn more about \"Recurring Migraine Headache: Care Instructions. \"  Current as of: October 14, 2016  Content Version: 11.3  © 5568-5153 [employer]. Care instructions adapted under license by [employer] (which disclaims liability or warranty for this information). If you have questions about a medical condition or this instruction, always ask your healthcare professional. [address] any warranty or liability for your use of this information.

## 2017-07-14 NOTE — PROGRESS NOTES
Oni Fernando is a 40 y.o. female who presents to the office today with the following:  Chief Complaint   Patient presents with    Follow-up     HTN       HPI   Here for f/u HTN. Just started on HCTZ 12.5 mg 3 days ago and is tolerating well. Has noticed no increase in urination. BP before med this AM in left arm was 150-160s/110s-120s. Did not check in other arm at that time. Still has same migraine HA, no worse. No neck pain/stiffness. Admits still usual migraine, but lasting longer than usual and her usual tx of Exedrin Migraine is still not working. Has had no relief with Maxalt. Says it also made her more nauseated and stomach hurt, now with pain in upper abdomen. Still nauseated and with photophobia from migraine as well, no new sxs otherwise. Has tried Imitrex and Amitriptyline- which helps but only because \"knocks her out\". Has been in hospital several times for her migraines, but did not feel like driving to [place] (does not trust local ERs). Has apt Aug 7th with Neurology now. No other new complaints/acute concerns, no neuro defects. Review of Systems   Constitutional: Negative for chills and fever. HENT: Negative for ear pain and sore throat. Eyes: Positive for photophobia. Respiratory: Negative for cough and shortness of breath. Cardiovascular: Negative for chest pain and leg swelling. Gastrointestinal: Negative for abdominal pain, diarrhea, nausea and vomiting. Genitourinary: Negative. Musculoskeletal: Negative for myalgias. Skin: Negative for rash. Neurological: Positive for headaches. Negative for dizziness, tingling, tremors, sensory change, speech change, focal weakness and loss of consciousness. See HPI.     Allergies   Allergen Reactions    Black Pepper Hives, Swelling and Cough    Mushroom Combination No.1 Shortness of Breath, Swelling and Cough    Tomato Hives, Swelling and Cough    Adhesive Tape-Silicones Hives     Causes burning at site     Dilaudid [Hydromorphone (Bulk)] Itching    Gluten Other (comments)     GI upset    Morphine Rash    Tramadol Rash     Tolerates percocet       Current Outpatient Prescriptions   Medication Sig    ondansetron (ZOFRAN ODT) 4 mg disintegrating tablet Take 1 Tab by mouth every eight (8) hours as needed for Nausea.  codeine-butalbital-acetaminophen-caffeine (FIORICET WITH CODEINE) -33-30 mg per capsule Take 1 Cap by mouth every six (6) hours as needed for Headache or Migraine. Max Daily Amount: 4 Caps.  ketorolac (TORADOL) 30 mg/mL (1 mL) injection 1 mL by IntraVENous route once for 1 dose.  magnesium 250 mg tab Take  by mouth.  hydroCHLOROthiazide (HYDRODIURIL) 12.5 mg tablet Take 1 Tab by mouth daily.  rizatriptan (MAXALT) 5 mg tablet May take 1-2 tablets by mouth x 1 dose. May repeat in 2 hours if needed x 2.    linaclotide (LINZESS) 290 mcg cap capsule Take 1 Cap by mouth Daily (before breakfast).  ipratropium (ATROVENT) 0.02 % nebulizer solution 2.5 mL by Nebulization route as needed for Wheezing.  pantoprazole (PROTONIX) 40 mg tablet Take 40 mg by mouth two (2) times a day.  ibuprofen-famotidine (DUEXIS) 800-26.6 mg tab Take 1 Tab by mouth three (3) times daily as needed.  MULTIVITAMIN PO Take  by mouth. Takes one tab po occasionally.  fluticasone-salmeterol (ADVAIR DISKUS) 250-50 mcg/dose diskus inhaler Take 1 Puff by inhalation every twelve (12) hours.  docusate sodium (COLACE) 50 mg capsule Take 2 Caps by mouth two (2) times daily as needed for Constipation.  albuterol (PROVENTIL HFA, VENTOLIN HFA) 90 mcg/actuation inhaler Take 1-2 puffs by inhalation every four (4) hours as needed for Wheezing.  montelukast (SINGULAIR) 10 mg tablet Take 10 mg by mouth nightly.  famotidine (PEPCID) 40 mg tablet Take 40 mg by mouth nightly.  gabapentin (NEURONTIN) 100 mg capsule Take 1 Cap by mouth daily.     guaiFENesin-codeine (ROBITUSSIN AC) 100-10 mg/5 mL solution 1-2 tsp q 4 hours prn cough. (has taken before without difficulty)     No current facility-administered medications for this visit.         Past Medical History:   Diagnosis Date    Acquired hypothyroidism     hyperthyroid    Anemia NEC     Arthritis     Asthma     CHF (congestive heart failure) (HCC)     Chronic pain     fibromyalgia    Colitis     GERD (gastroesophageal reflux disease)     Ill-defined condition     ACL repair on L leg     Other unknown and unspecified cause of morbidity or mortality     fibromyalgia    Other unknown and unspecified cause of morbidity or mortality     obesity    Pleural effusion associated with pulmonary infection     Psychiatric problem     anxiety    PUD (peptic ulcer disease)        Past Surgical History:   Procedure Laterality Date     DELIVERY ONLY      x2, one with classical uterine incision    HX  SECTION      x3 total    HX CHOLECYSTECTOMY      HX HEENT      sinus surgery    HX HERNIA REPAIR      HX ORTHOPAEDIC      ACL repair x2 - twice       Social History     Social History    Marital status:      Spouse name: N/A    Number of children: N/A    Years of education: N/A     Social History Main Topics    Smoking status: Never Smoker    Smokeless tobacco: Never Used    Alcohol use No    Drug use: No    Sexual activity: Yes     Partners: Male     Birth control/ protection: None     Other Topics Concern    None     Social History Narrative       Family History   Problem Relation Age of Onset    Diabetes Father     Heart Attack Father      62 smoker    Hypertension Father     Asthma Father     High Cholesterol Father     Stroke Father     Rashes/Skin Problems Father      eczema    Heart Disease Father     Hypertension Mother     Asthma Mother     Hypertension Maternal Grandmother     Stroke Maternal Grandmother     Heart Disease Maternal Grandfather     Diabetes Paternal Grandmother     Dementia Paternal Grandfather     Diabetes Brother     Diabetes Sister     Other Sister      ankylosing spondylitis    Hypertension Sister     Other Sister      ankylosing spondylitis    Hypertension Brother     Asthma Daughter     Asthma Son     Asthma Son     Asthma Son     Asthma Son     Asthma Son          Physical Exam:  Visit Vitals    BP (!) 145/92 (BP 1 Location: Right arm, BP Patient Position: Sitting)    Pulse 84    Temp 98.3 °F (36.8 °C) (Oral)    Resp 16    Ht 5' 4\" (1.626 m)    Wt 276 lb 6.4 oz (125.4 kg)    LMP 05/17/2017  Comment: Irregular. No preg concern ( had vasectomy)    SpO2 98%    BMI 47.44 kg/m2     Physical Exam   Constitutional: She is oriented to person, place, and time and well-developed, well-nourished, and in no distress. Obese AAF. Sitting in dark room with sunglasses. HENT:   Head: Normocephalic and atraumatic. Right Ear: External ear normal.   Left Ear: External ear normal.   Nose: Nose normal.   Mouth/Throat: Oropharynx is clear and moist.   Eyes: Conjunctivae and EOM are normal. Pupils are equal, round, and reactive to light. Neck: Normal range of motion and full passive range of motion without pain. Neck supple. No Brudzinski's sign and no Kernig's sign noted. Cardiovascular: Normal rate, regular rhythm and normal heart sounds. Pulmonary/Chest: Effort normal and breath sounds normal.   Abdominal: Soft. Bowel sounds are normal. She exhibits no distension and no mass. There is no hepatosplenomegaly. There is tenderness (epigastric mild, most ttp RUQ with deep palp). There is no rigidity, no rebound, no guarding, no CVA tenderness and no tenderness at McBurney's point. Dietrich's sign: difficult to assess as pain + throughout. Musculoskeletal: Normal range of motion. Cervical back: Normal.   Lymphadenopathy:     She has no cervical adenopathy. Neurological: She is alert and oriented to person, place, and time.  She has normal motor skills, normal sensation, normal strength and normal reflexes. No cranial nerve deficit. Gait normal. Gait normal.   Skin: Skin is warm and dry. No rash noted. No erythema. Psychiatric: Mood, memory, affect and judgment normal.   Nursing note and vitals reviewed. Assessment/Plan:    ICD-10-CM ICD-9-CM    1. Intractable migraine with aura without status migrainosus G43.119 346.01 CT HEAD WO CONT      codeine-butalbital-acetaminophen-caffeine (FIORICET WITH CODEINE) -78-30 mg per capsule      ketorolac (TORADOL) 30 mg/mL (1 mL) injection      KETOROLAC TROMETHAMINE INJ      IA THER/PROPH/DIAG INJECTION, SUBCUT/IM    Counseled on regular sleep, eating healthy, staying hydrated, regular exercise, and avoiding triggers. 2. Essential hypertension I10 401.9 Will also increase HCTZ to 25mg daily and f/u in 2 weeks. Encouraged to continue to monitor daily, keep log for f/u. Low sodium diet. Will also avoid any additional caffeine intake in addn to HA meds. 3. RUQ abdominal pain R10.11 789.01 CANCELED: AMYLASE      CANCELED: LIPASE   4. Epigastric abdominal tenderness without rebound tenderness R10.816 789.66 CANCELED: AMYLASE      CANCELED: LIPASE   5. Non-intractable vomiting with nausea, unspecified vomiting type R11.2 787.01 ondansetron (ZOFRAN ODT) 4 mg disintegrating tablet      CT HEAD WO CONT   6. Photophobia of both eyes H53.143 368.13 CT HEAD WO CONT   amylase/lipase added to labs for new onset upper abd pain/tenderness. With allergies, pt says has done fine with Codeine/Fioricet in past, although has multiple drug allergies to other pain medications- denies anaphylaxis, but has had hives. Warned of potential medication AEs/interactions. To seek immediate care/to ER if any rxn.    (Note: I was really hesitant to prescribe, but pt adamant about taking it as really does not like other options and has failed many already, thinks it will help and is not concerned of rxn (she tries to reassure me stating she has benadryl and Epi pen at home if ever were to have an issue) still, really want her to be cautious, will take while  around and at home in afternoon. Will notify me if any issues and will seek immediate care/ER if any severe rxn. For all complains, counseled on red flag sxs and pt agrees to seek immediate medical attn/to ER if any sever/worsening/alarm sxs do arise. Handouts provided. Total time spent with the patient today was 25 minutes of which more than 50% was spent face to face with the patient. Discussed plan with Dr. Gauri Posey who agrees. Follow-up Disposition:  Return in about 2 weeks (around 7/28/2017), or sooner if symptoms worsen or fail to improve.     Brook Su PA-C

## 2017-07-15 LAB
ALBUMIN SERPL-MCNC: 4.2 G/DL (ref 3.5–5.5)
ALBUMIN/GLOB SERPL: 1.2 {RATIO} (ref 1.2–2.2)
ALP SERPL-CCNC: 86 IU/L (ref 39–117)
ALT SERPL-CCNC: 24 IU/L (ref 0–32)
AST SERPL-CCNC: 23 IU/L (ref 0–40)
B BURGDOR IGG+IGM SER-ACNC: <0.91 ISR (ref 0–0.9)
B BURGDOR IGM SER IA-ACNC: <0.8 INDEX (ref 0–0.79)
BASOPHILS # BLD AUTO: 0 X10E3/UL (ref 0–0.2)
BASOPHILS NFR BLD AUTO: 0 %
BILIRUB SERPL-MCNC: 0.8 MG/DL (ref 0–1.2)
BUN SERPL-MCNC: 12 MG/DL (ref 6–20)
BUN/CREAT SERPL: 16 (ref 9–23)
CALCIUM SERPL-MCNC: 9.6 MG/DL (ref 8.7–10.2)
CHLORIDE SERPL-SCNC: 101 MMOL/L (ref 96–106)
CO2 SERPL-SCNC: 25 MMOL/L (ref 18–29)
CREAT SERPL-MCNC: 0.76 MG/DL (ref 0.57–1)
EOSINOPHIL # BLD AUTO: 0.3 X10E3/UL (ref 0–0.4)
EOSINOPHIL NFR BLD AUTO: 3 %
ERYTHROCYTE [DISTWIDTH] IN BLOOD BY AUTOMATED COUNT: 15.4 % (ref 12.3–15.4)
GLOBULIN SER CALC-MCNC: 3.6 G/DL (ref 1.5–4.5)
GLUCOSE SERPL-MCNC: 117 MG/DL (ref 65–99)
HCT VFR BLD AUTO: 38.4 % (ref 34–46.6)
HGB BLD-MCNC: 12.7 G/DL (ref 11.1–15.9)
IMM GRANULOCYTES # BLD: 0 X10E3/UL (ref 0–0.1)
IMM GRANULOCYTES NFR BLD: 0 %
LYMPHOCYTES # BLD AUTO: 2.9 X10E3/UL (ref 0.7–3.1)
LYMPHOCYTES NFR BLD AUTO: 30 %
MCH RBC QN AUTO: 26.4 PG (ref 26.6–33)
MCHC RBC AUTO-ENTMCNC: 33.1 G/DL (ref 31.5–35.7)
MCV RBC AUTO: 80 FL (ref 79–97)
MONOCYTES # BLD AUTO: 0.6 X10E3/UL (ref 0.1–0.9)
MONOCYTES NFR BLD AUTO: 6 %
NEUTROPHILS # BLD AUTO: 5.9 X10E3/UL (ref 1.4–7)
NEUTROPHILS NFR BLD AUTO: 61 %
PLATELET # BLD AUTO: 283 X10E3/UL (ref 150–379)
POTASSIUM SERPL-SCNC: 4.4 MMOL/L (ref 3.5–5.2)
PROT SERPL-MCNC: 7.8 G/DL (ref 6–8.5)
R RICKETTSI IGG SER QL IA: POSITIVE
R RICKETTSI IGG TITR SER IF: ABNORMAL {TITER}
RBC # BLD AUTO: 4.81 X10E6/UL (ref 3.77–5.28)
SODIUM SERPL-SCNC: 146 MMOL/L (ref 134–144)
TSH SERPL DL<=0.005 MIU/L-ACNC: 1.51 UIU/ML (ref 0.45–4.5)
WBC # BLD AUTO: 9.7 X10E3/UL (ref 3.4–10.8)

## 2017-07-17 ENCOUNTER — TELEPHONE (OUTPATIENT)
Dept: FAMILY MEDICINE CLINIC | Age: 37
End: 2017-07-17

## 2017-07-17 DIAGNOSIS — K92.1 BLOOD IN STOOL: Primary | ICD-10-CM

## 2017-07-17 LAB
AMYLASE SERPL-CCNC: 98 U/L (ref 31–124)
LIPASE SERPL-CCNC: 75 U/L (ref 0–59)
SPECIMEN STATUS REPORT, ROLRST: NORMAL

## 2017-07-17 RX ORDER — DOXYCYCLINE 100 MG/1
100 TABLET ORAL 2 TIMES DAILY
Qty: 28 TAB | Refills: 0 | Status: SHIPPED | OUTPATIENT
Start: 2017-07-17 | End: 2017-07-31

## 2017-07-17 RX ORDER — DOXYCYCLINE 100 MG/1
100 TABLET ORAL 2 TIMES DAILY
Qty: 20 TAB | Refills: 0 | Status: SHIPPED | OUTPATIENT
Start: 2017-07-17 | End: 2017-07-17 | Stop reason: CLARIF

## 2017-07-17 NOTE — TELEPHONE ENCOUNTER
During result call today pt notified of blood in stool with diarrhea x 1 day. Will  FOBT (See recent result note for other details) and rtc asap for further eval. To ER for any worsening.

## 2017-07-17 NOTE — TELEPHONE ENCOUNTER
Spoke to pharmacist at [facility] in [place] and canceled the 10 day Doxy Rx.,FANNY Vargheseolph [facility]

## 2017-07-17 NOTE — PROGRESS NOTES
Notified pt of labs. Will start tx for RMSF. Pt reports Fioricet worked well, no longer with HA. However, now with diarrhea with some bright red blood in stool x 1 day. Small amt. Same abd pain, no worsening. Inquires about US. Pt informed should rtc prior to 2 wk f/u for blood in stool. May  FOBT today and come in as soon as able. In meantime if large amt of blood or worsening/red flag sxs as discussed to go to ER. Pt agrees.

## 2017-07-18 NOTE — PROGRESS NOTES
Notify pt one pancreas test is just a little elevated and the other is normal.   Her Ultrasound has been scheduled, will call with results. In meantime any severe/worsening sxs to ER as previously instructed.

## 2017-07-19 DIAGNOSIS — L30.4 INTERTRIGO: Primary | ICD-10-CM

## 2017-07-19 DIAGNOSIS — B35.3 TINEA PEDIS, UNSPECIFIED LATERALITY: ICD-10-CM

## 2017-07-19 RX ORDER — CHLORPHENIRAMINE MALEATE 4 MG
TABLET ORAL 2 TIMES DAILY
Qty: 15 G | Refills: 0 | Status: SHIPPED | OUTPATIENT
Start: 2017-07-19 | End: 2020-03-25 | Stop reason: ALTCHOICE

## 2017-07-21 LAB — HEMOCCULT STL QL IA: NEGATIVE

## 2017-07-27 ENCOUNTER — HOSPITAL ENCOUNTER (EMERGENCY)
Age: 37
Discharge: HOME | End: 2017-07-27
Attending: EMERGENCY MEDICINE
Payer: COMMERCIAL

## 2017-07-27 ENCOUNTER — APPOINTMENT (OUTPATIENT)
Dept: CT IMAGING | Age: 37
End: 2017-07-27
Attending: EMERGENCY MEDICINE
Payer: COMMERCIAL

## 2017-07-27 VITALS
DIASTOLIC BLOOD PRESSURE: 72 MMHG | TEMPERATURE: 98 F | HEIGHT: 64 IN | SYSTOLIC BLOOD PRESSURE: 115 MMHG | HEART RATE: 90 BPM | RESPIRATION RATE: 16 BRPM | OXYGEN SATURATION: 100 % | WEIGHT: 276.24 LBS | BODY MASS INDEX: 47.16 KG/M2

## 2017-07-27 DIAGNOSIS — T14.8XXA MUSCLE STRAIN: ICD-10-CM

## 2017-07-27 DIAGNOSIS — R10.11 ABDOMINAL PAIN, RIGHT UPPER QUADRANT: Primary | ICD-10-CM

## 2017-07-27 DIAGNOSIS — R10.13 ABDOMINAL PAIN, EPIGASTRIC: ICD-10-CM

## 2017-07-27 LAB
ALBUMIN SERPL BCP-MCNC: 3.5 G/DL (ref 3.5–5)
ALBUMIN/GLOB SERPL: 0.9 {RATIO} (ref 1.1–2.2)
ALP SERPL-CCNC: 75 U/L (ref 45–117)
ALT SERPL-CCNC: 30 U/L (ref 12–78)
ANION GAP BLD CALC-SCNC: 5 MMOL/L (ref 5–15)
APPEARANCE UR: CLEAR
AST SERPL W P-5'-P-CCNC: 46 U/L (ref 15–37)
BACTERIA URNS QL MICRO: NEGATIVE /HPF
BASOPHILS # BLD AUTO: 0 K/UL (ref 0–0.1)
BASOPHILS # BLD: 0 % (ref 0–1)
BILIRUB SERPL-MCNC: 0.6 MG/DL (ref 0.2–1)
BILIRUB UR QL: NEGATIVE
BUN SERPL-MCNC: 13 MG/DL (ref 6–20)
BUN/CREAT SERPL: 16 (ref 12–20)
CALCIUM SERPL-MCNC: 8.5 MG/DL (ref 8.5–10.1)
CHLORIDE SERPL-SCNC: 101 MMOL/L (ref 97–108)
CO2 SERPL-SCNC: 30 MMOL/L (ref 21–32)
COLOR UR: NORMAL
CREAT SERPL-MCNC: 0.8 MG/DL (ref 0.55–1.02)
EOSINOPHIL # BLD: 0.2 K/UL (ref 0–0.4)
EOSINOPHIL NFR BLD: 3 % (ref 0–7)
EPITH CASTS URNS QL MICRO: NORMAL /LPF
ERYTHROCYTE [DISTWIDTH] IN BLOOD BY AUTOMATED COUNT: 14.1 % (ref 11.5–14.5)
GLOBULIN SER CALC-MCNC: 4.1 G/DL (ref 2–4)
GLUCOSE SERPL-MCNC: 127 MG/DL (ref 65–100)
GLUCOSE UR STRIP.AUTO-MCNC: NEGATIVE MG/DL
HCG UR QL: NEGATIVE
HCT VFR BLD AUTO: 35.3 % (ref 35–47)
HGB BLD-MCNC: 12 G/DL (ref 11.5–16)
HGB UR QL STRIP: NEGATIVE
KETONES UR QL STRIP.AUTO: NEGATIVE MG/DL
LEUKOCYTE ESTERASE UR QL STRIP.AUTO: NEGATIVE
LIPASE SERPL-CCNC: 151 U/L (ref 73–393)
LYMPHOCYTES # BLD AUTO: 32 % (ref 12–49)
LYMPHOCYTES # BLD: 2.1 K/UL (ref 0.8–3.5)
MCH RBC QN AUTO: 27.7 PG (ref 26–34)
MCHC RBC AUTO-ENTMCNC: 34 G/DL (ref 30–36.5)
MCV RBC AUTO: 81.5 FL (ref 80–99)
MONOCYTES # BLD: 0.4 K/UL (ref 0–1)
MONOCYTES NFR BLD AUTO: 6 % (ref 5–13)
NEUTS SEG # BLD: 3.9 K/UL (ref 1.8–8)
NEUTS SEG NFR BLD AUTO: 59 % (ref 32–75)
NITRITE UR QL STRIP.AUTO: NEGATIVE
PH UR STRIP: 6 [PH] (ref 5–8)
PLATELET # BLD AUTO: 229 K/UL (ref 150–400)
POTASSIUM SERPL-SCNC: 3.2 MMOL/L (ref 3.5–5.1)
PROT SERPL-MCNC: 7.6 G/DL (ref 6.4–8.2)
PROT UR STRIP-MCNC: NEGATIVE MG/DL
RBC # BLD AUTO: 4.33 M/UL (ref 3.8–5.2)
RBC #/AREA URNS HPF: NORMAL /HPF (ref 0–5)
SODIUM SERPL-SCNC: 136 MMOL/L (ref 136–145)
SP GR UR REFRACTOMETRY: 1.02 (ref 1–1.03)
UA: UC IF INDICATED,UAUC: NORMAL
UROBILINOGEN UR QL STRIP.AUTO: 0.2 EU/DL (ref 0.2–1)
WBC # BLD AUTO: 6.6 K/UL (ref 3.6–11)
WBC URNS QL MICRO: NORMAL /HPF (ref 0–4)

## 2017-07-27 PROCEDURE — 81025 URINE PREGNANCY TEST: CPT

## 2017-07-27 PROCEDURE — 96361 HYDRATE IV INFUSION ADD-ON: CPT

## 2017-07-27 PROCEDURE — 74011250636 HC RX REV CODE- 250/636: Performed by: EMERGENCY MEDICINE

## 2017-07-27 PROCEDURE — 74011636320 HC RX REV CODE- 636/320: Performed by: EMERGENCY MEDICINE

## 2017-07-27 PROCEDURE — 74011250637 HC RX REV CODE- 250/637: Performed by: EMERGENCY MEDICINE

## 2017-07-27 PROCEDURE — 80053 COMPREHEN METABOLIC PANEL: CPT | Performed by: EMERGENCY MEDICINE

## 2017-07-27 PROCEDURE — 99285 EMERGENCY DEPT VISIT HI MDM: CPT

## 2017-07-27 PROCEDURE — 81001 URINALYSIS AUTO W/SCOPE: CPT | Performed by: EMERGENCY MEDICINE

## 2017-07-27 PROCEDURE — 96375 TX/PRO/DX INJ NEW DRUG ADDON: CPT

## 2017-07-27 PROCEDURE — 85025 COMPLETE CBC W/AUTO DIFF WBC: CPT | Performed by: EMERGENCY MEDICINE

## 2017-07-27 PROCEDURE — 74177 CT ABD & PELVIS W/CONTRAST: CPT

## 2017-07-27 PROCEDURE — 36415 COLL VENOUS BLD VENIPUNCTURE: CPT | Performed by: EMERGENCY MEDICINE

## 2017-07-27 PROCEDURE — 93005 ELECTROCARDIOGRAM TRACING: CPT

## 2017-07-27 PROCEDURE — 83690 ASSAY OF LIPASE: CPT | Performed by: EMERGENCY MEDICINE

## 2017-07-27 PROCEDURE — 74011000250 HC RX REV CODE- 250: Performed by: EMERGENCY MEDICINE

## 2017-07-27 PROCEDURE — 96376 TX/PRO/DX INJ SAME DRUG ADON: CPT

## 2017-07-27 PROCEDURE — 96374 THER/PROPH/DIAG INJ IV PUSH: CPT

## 2017-07-27 RX ORDER — ONDANSETRON 4 MG/1
4 TABLET, FILM COATED ORAL
Qty: 20 TAB | Refills: 0 | Status: SHIPPED | OUTPATIENT
Start: 2017-07-27 | End: 2018-03-08

## 2017-07-27 RX ORDER — FAMOTIDINE 10 MG/ML
20 INJECTION INTRAVENOUS
Status: COMPLETED | OUTPATIENT
Start: 2017-07-27 | End: 2017-07-27

## 2017-07-27 RX ORDER — FENTANYL CITRATE 50 UG/ML
50 INJECTION, SOLUTION INTRAMUSCULAR; INTRAVENOUS
Status: COMPLETED | OUTPATIENT
Start: 2017-07-27 | End: 2017-07-27

## 2017-07-27 RX ORDER — CYCLOBENZAPRINE HCL 5 MG
5 TABLET ORAL
Qty: 20 TAB | Refills: 0 | Status: SHIPPED | OUTPATIENT
Start: 2017-07-27 | End: 2018-03-08

## 2017-07-27 RX ORDER — SODIUM CHLORIDE 0.9 % (FLUSH) 0.9 %
10 SYRINGE (ML) INJECTION
Status: COMPLETED | OUTPATIENT
Start: 2017-07-27 | End: 2017-07-27

## 2017-07-27 RX ORDER — SODIUM CHLORIDE 9 MG/ML
50 INJECTION, SOLUTION INTRAVENOUS
Status: COMPLETED | OUTPATIENT
Start: 2017-07-27 | End: 2017-07-27

## 2017-07-27 RX ORDER — OMEPRAZOLE 20 MG/1
20 CAPSULE, DELAYED RELEASE ORAL DAILY
Qty: 60 CAP | Refills: 0 | Status: SHIPPED | OUTPATIENT
Start: 2017-07-27 | End: 2018-03-08

## 2017-07-27 RX ORDER — ONDANSETRON 2 MG/ML
4 INJECTION INTRAMUSCULAR; INTRAVENOUS
Status: COMPLETED | OUTPATIENT
Start: 2017-07-27 | End: 2017-07-27

## 2017-07-27 RX ORDER — HYDROCODONE BITARTRATE AND ACETAMINOPHEN 5; 325 MG/1; MG/1
1 TABLET ORAL
Qty: 10 TAB | Refills: 0 | Status: SHIPPED | OUTPATIENT
Start: 2017-07-27 | End: 2017-08-07 | Stop reason: CLARIF

## 2017-07-27 RX ORDER — OXYCODONE AND ACETAMINOPHEN 5; 325 MG/1; MG/1
1 TABLET ORAL
Status: COMPLETED | OUTPATIENT
Start: 2017-07-27 | End: 2017-07-27

## 2017-07-27 RX ADMIN — Medication 10 ML: at 15:59

## 2017-07-27 RX ADMIN — ONDANSETRON 4 MG: 2 INJECTION INTRAMUSCULAR; INTRAVENOUS at 13:11

## 2017-07-27 RX ADMIN — ONDANSETRON 4 MG: 2 INJECTION INTRAMUSCULAR; INTRAVENOUS at 14:32

## 2017-07-27 RX ADMIN — FENTANYL CITRATE 50 MCG: 50 INJECTION, SOLUTION INTRAMUSCULAR; INTRAVENOUS at 14:32

## 2017-07-27 RX ADMIN — SODIUM CHLORIDE 1000 ML: 900 INJECTION, SOLUTION INTRAVENOUS at 13:42

## 2017-07-27 RX ADMIN — OXYCODONE HYDROCHLORIDE AND ACETAMINOPHEN 1 TABLET: 5; 325 TABLET ORAL at 17:13

## 2017-07-27 RX ADMIN — LIDOCAINE HYDROCHLORIDE 40 ML: 20 SOLUTION ORAL; TOPICAL at 13:12

## 2017-07-27 RX ADMIN — SODIUM CHLORIDE 50 ML/HR: 900 INJECTION, SOLUTION INTRAVENOUS at 15:59

## 2017-07-27 RX ADMIN — FAMOTIDINE 20 MG: 10 INJECTION, SOLUTION INTRAVENOUS at 14:32

## 2017-07-27 RX ADMIN — IOPAMIDOL 100 ML: 755 INJECTION, SOLUTION INTRAVENOUS at 15:59

## 2017-07-27 NOTE — ED PROVIDER NOTES
HPI Comments: Eri Seals, 40 y.o. female w/ hx of hiatal hernia, CHF, GERD, and PUD presents ambulatory to [facility] ED with cc of a dull burning epigastric abdominal pain x 1 week. She states that it has progressively worsened to a sharp shooting pain last night, and she states that it is exacerbated when laying flat. She also c/o associated nausea and constipation, but she notes that she had an episode of diarrhea yesterday. She states that she was recently bitten by a tick, and she was prescribed doxycycline by her PCP. She reports that she is still taking doxycycline, but she is also taking Benadryl. She reports that she has recently developed an allergy to red meat. Per medical records, patient recently (17) had a RUQ ultrasound with significant in an enlarged liver. Pt denies wheezes, cough, or fever. PCP: Gabe Crum PA-C    Social history significant for: - Tobacco, - EtOH, - Illicit Drug Use    There are no other complaints, changes, or physical findings at this time. The history is provided by the patient. No  was used.         Past Medical History:   Diagnosis Date    Acquired hypothyroidism     hyperthyroid    Anemia NEC     Arthritis     Asthma     CHF (congestive heart failure) (HCC)     Chronic pain     fibromyalgia    Colitis     GERD (gastroesophageal reflux disease)     Ill-defined condition     ACL repair on L leg     Other unknown and unspecified cause of morbidity or mortality     fibromyalgia    Other unknown and unspecified cause of morbidity or mortality     obesity    Pleural effusion associated with pulmonary infection     Psychiatric problem     anxiety    PUD (peptic ulcer disease)        Past Surgical History:   Procedure Laterality Date     DELIVERY ONLY      x2, one with classical uterine incision    HX  SECTION      x3 total    HX CHOLECYSTECTOMY      HX HEENT      sinus surgery    HX HERNIA REPAIR      HX ORTHOPAEDIC      ACL repair x2 - twice         Family History:   Problem Relation Age of Onset    Diabetes Father     Heart Attack Father      62 smoker    Hypertension Father     Asthma Father     High Cholesterol Father     Stroke Father     Rashes/Skin Problems Father      eczema    Heart Disease Father     Hypertension Mother     Asthma Mother     Hypertension Maternal Grandmother     Stroke Maternal Grandmother     Heart Disease Maternal Grandfather     Diabetes Paternal Grandmother     Dementia Paternal Grandfather     Diabetes Brother     Diabetes Sister     Other Sister      ankylosing spondylitis    Hypertension Sister     Other Sister      ankylosing spondylitis    Hypertension Brother     Asthma Daughter     Asthma Son     Asthma Son     Asthma Son     Asthma Son     Asthma Son        Social History     Social History    Marital status:      Spouse name: N/A    Number of children: N/A    Years of education: N/A     Occupational History    Not on file. Social History Main Topics    Smoking status: Never Smoker    Smokeless tobacco: Never Used    Alcohol use No    Drug use: No    Sexual activity: Yes     Partners: Male     Birth control/ protection: None     Other Topics Concern    Not on file     Social History Narrative         ALLERGIES: Black pepper; Mushroom combination no.1; Tomato; Adhesive tape-silicones; Dicyclomine; Dilaudid [hydromorphone (bulk)]; Gluten; Morphine; and Tramadol    Review of Systems   Constitutional: Negative for chills and fever. Respiratory: Negative for cough, shortness of breath and wheezing. Cardiovascular: Negative for chest pain. Gastrointestinal: Positive for abdominal pain, constipation, diarrhea and nausea. Negative for vomiting. Neurological: Negative for weakness and numbness. All other systems reviewed and are negative.       Vitals:    07/27/17 1142 07/27/17 1440   BP: (!) 151/104 145/71   Pulse: 82 90   Resp: 16 16   Temp: 98 °F (36.7 °C)    SpO2: 99% 99%   Weight: 125.3 kg (276 lb 3.8 oz)    Height: 5' 4\" (1.626 m)             Physical Exam   Constitutional: She is oriented to person, place, and time. She appears well-developed and well-nourished. HENT:   Head: Normocephalic and atraumatic. Eyes: Conjunctivae and EOM are normal.   Neck: Normal range of motion. Neck supple. Cardiovascular: Normal rate and regular rhythm. Pulmonary/Chest: Effort normal and breath sounds normal. No respiratory distress. Abdominal: Soft. She exhibits no distension. There is tenderness in the epigastric area. Minimal tenderness to right upper quadrant  Point tenderness to mid right abdomen   Musculoskeletal: Normal range of motion. Neurological: She is alert and oriented to person, place, and time. Skin: Skin is warm and dry. Psychiatric: She has a normal mood and affect. Nursing note and vitals reviewed. MDM  Number of Diagnoses or Management Options  Abdominal pain, epigastric:   Abdominal pain, right upper quadrant:   Muscle strain:   Diagnosis management comments: Patient presents with epigastric abdominal pain. Differential includes gastritis, pancreatitis cholelithiasis, cholecystitis, hepatitis, muscular strain, renal pathology, gastroenteritis. Less likely ACS. Will obtain labs and reassess. Will give fluids, analgesics and antiemetics PRN. Had US done 1 week ago which was neg so unlikely GB/liver. Amount and/or Complexity of Data Reviewed  Clinical lab tests: ordered and reviewed  Tests in the radiology section of CPT®: ordered and reviewed  Tests in the medicine section of CPT®: reviewed and ordered  Review and summarize past medical records: yes  Independent visualization of images, tracings, or specimens: yes    Patient Progress  Patient progress: stable    ED Course       Procedures     Progress Note:  2:31 PM  The pt was re-evaluated and states that she has no changes in symptoms.  She is complaining of burning epigastric pain as well as focal tenderness over her right mid abdomen. Pt was given a GI cocktail with no relief. Will obtain a CT abdomen to rule out appendicitis, kidney stone, or SBO. Will give fentanyl and IV Pepcid here. BEDSIDE SIGN OUT:  3:47 PM  Discussed pt's hx, disposition, and available diagnostic and imaging results with Rivas Urban MD at bedside with the patient. Reviewed care plans. Both providers and patient are in agreement with care plan. Carola Valentino MD is transferring care of the pt to Rivas Urban MD at this time. Patient's presentation, labs/imaging and plan of care was reviewed with Rivas Urban MD as part of sign out. They will continue care plan as part of the plan discussed with the patient. Rivas Urban MD's assistance in completion of this plan is greatly appreciated but it should be noted that I will be the provider of record for this patient. LABORATORY TESTS:  Recent Results (from the past 12 hour(s))   EKG, 12 LEAD, INITIAL    Collection Time: 07/27/17 11:46 AM   Result Value Ref Range    Ventricular Rate 70 BPM    Atrial Rate 70 BPM    P-R Interval 164 ms    QRS Duration 112 ms    Q-T Interval 398 ms    QTC Calculation (Bezet) 429 ms    Calculated P Axis 37 degrees    Calculated R Axis -2 degrees    Diagnosis       Normal sinus rhythm  Normal ECG  When compared with ECG of 26-AUG-2015 00:30,  No significant change was found     CBC WITH AUTOMATED DIFF    Collection Time: 07/27/17 12:41 PM   Result Value Ref Range    WBC 6.6 3.6 - 11.0 K/uL    RBC 4.33 3.80 - 5.20 M/uL    HGB 12.0 11.5 - 16.0 g/dL    HCT 35.3 35.0 - 47.0 %    MCV 81.5 80.0 - 99.0 FL    MCH 27.7 26.0 - 34.0 PG    MCHC 34.0 30.0 - 36.5 g/dL    RDW 14.1 11.5 - 14.5 %    PLATELET 033 396 - 477 K/uL    NEUTROPHILS 59 32 - 75 %    LYMPHOCYTES 32 12 - 49 %    MONOCYTES 6 5 - 13 %    EOSINOPHILS 3 0 - 7 %    BASOPHILS 0 0 - 1 %    ABS.  NEUTROPHILS 3.9 1.8 - 8.0 K/UL ABS. LYMPHOCYTES 2.1 0.8 - 3.5 K/UL    ABS. MONOCYTES 0.4 0.0 - 1.0 K/UL    ABS. EOSINOPHILS 0.2 0.0 - 0.4 K/UL    ABS. BASOPHILS 0.0 0.0 - 0.1 K/UL   METABOLIC PANEL, COMPREHENSIVE    Collection Time: 07/27/17 12:41 PM   Result Value Ref Range    Sodium 136 136 - 145 mmol/L    Potassium 3.2 (L) 3.5 - 5.1 mmol/L    Chloride 101 97 - 108 mmol/L    CO2 30 21 - 32 mmol/L    Anion gap 5 5 - 15 mmol/L    Glucose 127 (H) 65 - 100 mg/dL    BUN 13 6 - 20 MG/DL    Creatinine 0.80 0.55 - 1.02 MG/DL    BUN/Creatinine ratio 16 12 - 20      GFR est AA >60 >60 ml/min/1.73m2    GFR est non-AA >60 >60 ml/min/1.73m2    Calcium 8.5 8.5 - 10.1 MG/DL    Bilirubin, total 0.6 0.2 - 1.0 MG/DL    ALT (SGPT) 30 12 - 78 U/L    AST (SGOT) 46 (H) 15 - 37 U/L    Alk. phosphatase 75 45 - 117 U/L    Protein, total 7.6 6.4 - 8.2 g/dL    Albumin 3.5 3.5 - 5.0 g/dL    Globulin 4.1 (H) 2.0 - 4.0 g/dL    A-G Ratio 0.9 (L) 1.1 - 2.2     LIPASE    Collection Time: 07/27/17 12:41 PM   Result Value Ref Range    Lipase 151 73 - 393 U/L   URINALYSIS W/ REFLEX CULTURE    Collection Time: 07/27/17  2:43 PM   Result Value Ref Range    Color YELLOW/STRAW      Appearance CLEAR CLEAR      Specific gravity 1.017 1.003 - 1.030      pH (UA) 6.0 5.0 - 8.0      Protein NEGATIVE  NEG mg/dL    Glucose NEGATIVE  NEG mg/dL    Ketone NEGATIVE  NEG mg/dL    Bilirubin NEGATIVE  NEG      Blood NEGATIVE  NEG      Urobilinogen 0.2 0.2 - 1.0 EU/dL    Nitrites NEGATIVE  NEG      Leukocyte Esterase NEGATIVE  NEG      WBC 0-4 0 - 4 /hpf    RBC 0-5 0 - 5 /hpf    Epithelial cells FEW FEW /lpf    Bacteria NEGATIVE  NEG /hpf    UA:UC IF INDICATED CULTURE NOT INDICATED BY UA RESULT CNI     HCG URINE, QL. - POC    Collection Time: 07/27/17  3:43 PM   Result Value Ref Range    Pregnancy test,urine (POC) NEGATIVE  NEG         IMAGING RESULTS:   CT ABD PELV W CONT    (Results Pending)     INDICATION: right abd pain  pain for one week.  Right lower quadrant.     COMPARISON: None     TECHNIQUE:   Following the uneventful intravenous administration of 100 cc Isovue-370, thin  axial images were obtained through the abdomen and pelvis. Coronal and sagittal  reconstructions were generated. Oral contrast was not administered. CT dose  reduction was achieved through use of a standardized protocol tailored for this  examination and automatic exposure control for dose modulation.     FINDINGS:   LUNG BASES: Clear. LIVER: No mass or biliary dilatation. GALLBLADDER: Absent  SPLEEN: No mass.     PANCREAS: No mass or ductal dilatation. ADRENALS: Unremarkable. KIDNEYS: No mass, calculus, or hydronephrosis.     GI: No dilatation or wall thickening.     APPENDIX: Unremarkable. PERITONEUM: No ascites or pneumoperitoneum. There is diastases of the rectus  muscles above the level of the umbilicus. There is no incarcerated bowel. RETROPERITONEUM: No lymphadenopathy or aortic aneurysm.        URINARY BLADDER: No mass or calculus. PELVIS: The uterus is retroverted. Dominant follicle noted on the left. No  adenopathy or abnormal mass. BONES: No destructive bone lesion. ADDITIONAL COMMENTS: N/A     IMPRESSION:  No acute abnormality in the abdomen or pelvis. MEDICATIONS GIVEN:  Medications   0.9% sodium chloride infusion (not administered)   sodium chloride (NS) flush 10 mL (not administered)   iopamidol (ISOVUE-370) 76 % injection 100 mL (not administered)   mylanta/viscous lidocaine (SUSU)(GI COCKTAIL) (40 mL Oral Given 7/27/17 1312)   ondansetron (ZOFRAN) injection 4 mg (4 mg IntraVENous Given 7/27/17 1311)   sodium chloride 0.9 % bolus infusion 1,000 mL (1,000 mL IntraVENous New Bag 7/27/17 1342)   fentaNYL citrate (PF) injection 50 mcg (50 mcg IntraVENous Given 7/27/17 1432)   famotidine (PF) (PEPCID) injection 20 mg (20 mg IntraVENous Given 7/27/17 1432)   ondansetron (ZOFRAN) injection 4 mg (4 mg IntraVENous Given 7/27/17 1432)       IMPRESSION:  1. Abdominal pain, right upper quadrant    2. Abdominal pain, epigastric    3. Muscle strain        PLAN:  1. Current Discharge Medication List      START taking these medications    Details   omeprazole (PRILOSEC) 20 mg capsule Take 1 Cap by mouth daily. Qty: 60 Cap, Refills: 0      cyclobenzaprine (FLEXERIL) 5 mg tablet Take 1 Tab by mouth three (3) times daily as needed for Muscle Spasm(s). Qty: 20 Tab, Refills: 0      HYDROcodone-acetaminophen (NORCO) 5-325 mg per tablet Take 1 Tab by mouth every four (4) hours as needed for Pain. Max Daily Amount: 6 Tabs. Qty: 10 Tab, Refills: 0      ondansetron hcl (ZOFRAN, AS HYDROCHLORIDE,) 4 mg tablet Take 1 Tab by mouth every eight (8) hours as needed for Nausea. Qty: 20 Tab, Refills: 0         CONTINUE these medications which have NOT CHANGED    Details   famotidine (PEPCID) 40 mg tablet Take 40 mg by mouth nightly. STOP taking these medications       ondansetron (ZOFRAN ODT) 4 mg disintegrating tablet Comments:   Reason for Stopping:         codeine-butalbital-acetaminophen-caffeine (FIORICET WITH CODEINE) -24-30 mg per capsule Comments:   Reason for Stopping:         pantoprazole (PROTONIX) 40 mg tablet Comments:   Reason for Stoppin.   Follow-up Information     Follow up With Details Comments 500 Kehinde Max PA-C  As needed [facility]  [phone]      Jocelynn Huber MD Schedule an appointment as soon as possible for a visit  [address] [place] [address]  [phone]          Return to ED if worse     Discharge Note:    The pt is ready for discharge. The pt's signs, symptoms, diagnosis, and discharge instructions have been discussed and pt has conveyed their understanding. The pt is to follow up as recommended or return to ER should their symptoms worsen. Plan has been discussed and pt is in agreement.     This note is prepared by [employer], acting as a Scribe for Jie Vidales MD.    Jie Vidales, MD: The scribe's documentation has been prepared under my direction and personally reviewed by me in its entirety. I confirm that the notes above accurately reflects all work, treatment, procedures, and medical decision making performed by me.

## 2017-07-27 NOTE — DISCHARGE INSTRUCTIONS

## 2017-07-27 NOTE — ED NOTES
Discharge instructions reviewed with patient. Discharge instructions given to patient per Dr. Selvin Last. Patient able to return/verbalize discharge instructions. Copy of discharge instructions provided. Patient condition stable, respiratory status within normal limits, neuro status intact. Ambulatory out of ER, accompanied by .

## 2017-07-27 NOTE — ED NOTES
Bedside and Verbal shift change received from [address]. Report included the following information: SBAR, ED Summary, MAR and Recent Results.

## 2017-07-28 LAB
ATRIAL RATE: 70 BPM
CALCULATED P AXIS, ECG09: 37 DEGREES
CALCULATED R AXIS, ECG10: -2 DEGREES
DIAGNOSIS, 93000: NORMAL
P-R INTERVAL, ECG05: 164 MS
Q-T INTERVAL, ECG07: 398 MS
QRS DURATION, ECG06: 112 MS
QTC CALCULATION (BEZET), ECG08: 429 MS
VENTRICULAR RATE, ECG03: 70 BPM

## 2017-08-07 ENCOUNTER — OFFICE VISIT (OUTPATIENT)
Dept: NEUROLOGY | Age: 37
End: 2017-08-07

## 2017-08-07 VITALS
DIASTOLIC BLOOD PRESSURE: 87 MMHG | TEMPERATURE: 97 F | WEIGHT: 276 LBS | HEART RATE: 71 BPM | BODY MASS INDEX: 47.12 KG/M2 | SYSTOLIC BLOOD PRESSURE: 139 MMHG | HEIGHT: 64 IN | RESPIRATION RATE: 12 BRPM

## 2017-08-07 DIAGNOSIS — G44.209 TENSION VASCULAR HEADACHE: Primary | ICD-10-CM

## 2017-08-07 DIAGNOSIS — G60.8 IDIOPATHIC SMALL AND LARGE FIBER SENSORY NEUROPATHY: ICD-10-CM

## 2017-08-07 DIAGNOSIS — E55.9 VITAMIN D DEFICIENCY: ICD-10-CM

## 2017-08-07 DIAGNOSIS — E11.42 DIABETIC PERIPHERAL NEUROPATHY ASSOCIATED WITH TYPE 2 DIABETES MELLITUS (HCC): ICD-10-CM

## 2017-08-07 DIAGNOSIS — G25.81 RESTLESS LEG SYNDROME: ICD-10-CM

## 2017-08-07 DIAGNOSIS — E53.8 B12 DEFICIENCY: ICD-10-CM

## 2017-08-07 DIAGNOSIS — G43.109 MIGRAINE WITH AURA AND WITHOUT STATUS MIGRAINOSUS, NOT INTRACTABLE: ICD-10-CM

## 2017-08-07 DIAGNOSIS — M26.609 TMJ (TEMPOROMANDIBULAR JOINT SYNDROME): ICD-10-CM

## 2017-08-07 DIAGNOSIS — G56.03 BILATERAL CARPAL TUNNEL SYNDROME: ICD-10-CM

## 2017-08-07 DIAGNOSIS — E03.4 HYPOTHYROIDISM DUE TO ACQUIRED ATROPHY OF THYROID: ICD-10-CM

## 2017-08-07 RX ORDER — CHOLECALCIFEROL (VITAMIN D3) 125 MCG
CAPSULE ORAL
COMMUNITY
End: 2018-03-08

## 2017-08-07 RX ORDER — TOPIRAMATE 50 MG/1
50 TABLET, FILM COATED ORAL 2 TIMES DAILY
Qty: 100 TAB | Refills: 5 | Status: SHIPPED | OUTPATIENT
Start: 2017-08-07 | End: 2018-03-08

## 2017-08-07 RX ORDER — ZOLMITRIPTAN 5 MG/1
5 TABLET, ORALLY DISINTEGRATING ORAL AS NEEDED
Qty: 12 TAB | Refills: 11 | Status: SHIPPED | OUTPATIENT
Start: 2017-08-07 | End: 2018-10-10 | Stop reason: SDUPTHER

## 2017-08-07 NOTE — MR AVS SNAPSHOT
Visit Information Date & Time Provider Department Dept. Phone Encounter #  
 8/7/2017 11:30 AM Francisco Rea MD [facility] [phone] [id number] Follow-up Instructions Return in about 6 months (around 2/7/2018). Your Appointments 8/17/2017 10:30 AM  
ESTABLISHED PATIENT with Royce Seals NP  
[address] ([address]) Appt Note: 3 mo F/U  
 [address] [address]  
[address] [facility] [address] [facility] [address] 9/13/2017 10:00 AM  
PROCEDURE with Francisco Rea MD  
Neurology Clinic at [facility] [address]) Appt Note: procedure EMG VIC arms and legs $ 0 CP jll 8/7/17  
 [address] [place] [address], 
[address] [place] [address], [phone]  
[address] [facility], [address] [place] [address], [address] [facility] [facility] [phone] Upcoming Health Maintenance Date Due DTaP/Tdap/Td series (1 - Tdap) 1/23/2002 PAP AKA CERVICAL CYTOLOGY 1/23/2002 INFLUENZA AGE 9 TO ADULT 8/1/2017 Allergies as of 8/7/2017  Review Complete On: 8/7/2017 By: Francisco Rea MD  
  
 Severity Noted Reaction Type Reactions Black Pepper Medium 09/15/2014    Hives, Swelling, Cough Mushroom Combination No.1 Medium 09/15/2014    Shortness of Breath, Swelling, Cough Tomato Medium 09/15/2014    Hives, Swelling, Cough Adhesive Tape-silicones  34/82/5124   Topical Hives Causes burning at site Dicyclomine  07/27/2017    Rash Dilaudid [Hydromorphone (Bulk)]  09/16/2014    Itching Gluten Low 06/13/2013    Other (comments) GI upset Morphine Low 01/17/2011   Side Effect Rash Tramadol Low 01/17/2011   Side Effect Rash Tolerates percocet Current Immunizations  Reviewed on 9/22/2014 Name Date  Influenza Vaccine PF 10/1/2014  6:06 PM  
 Pneumococcal Polysaccharide (PPSV-23) 10/1/2014  6:08 PM  
  
 Not reviewed this visit You Were Diagnosed With   
  
 Codes Comments Tension vascular headache    -  Primary ICD-10-CM: J30.774 ICD-9-CM: 307.81 Migraine with aura and without status migrainosus, not intractable     ICD-10-CM: G43.109 ICD-9-CM: 346.00 Idiopathic small and large fiber sensory neuropathy     ICD-10-CM: G60.8 ICD-9-CM: 356.4 B12 deficiency     ICD-10-CM: E53.8 ICD-9-CM: 266.2 Vitamin D deficiency     ICD-10-CM: E55.9 ICD-9-CM: 268.9 Restless leg syndrome     ICD-10-CM: G25.81 ICD-9-CM: 333.94 Diabetic peripheral neuropathy associated with type 2 diabetes mellitus (HCC)     ICD-10-CM: E11.42 
ICD-9-CM: 250.60, 357.2 Hypothyroidism due to acquired atrophy of thyroid     ICD-10-CM: E03.4 ICD-9-CM: 244.8, 246.8 Vitals BP Pulse Temp Resp Height(growth percentile) Weight(growth percentile) 139/87 71 97 °F (36.1 °C) 12 5' 4\" (1.626 m) 276 lb (125.2 kg) LMP BMI OB Status Smoking Status 05/17/2017 47.38 kg/m2 Having regular periods Never Smoker Vitals History BMI and BSA Data Body Mass Index Body Surface Area  
 47.38 kg/m 2 2.38 m 2 Preferred Pharmacy Pharmacy Name Phone [facility]/PHARMACY #[id number] [place], [address] [phone] Your Updated Medication List  
  
   
This list is accurate as of: 8/7/17 12:50 PM.  Always use your most recent med list.  
  
  
  
  
 Mayme Ronn 250-50 mcg/dose diskus inhaler Generic drug:  fluticasone-salmeterol Take 1 Puff by inhalation every twelve (12) hours. albuterol 90 mcg/actuation inhaler Commonly known as:  PROVENTIL HFA, VENTOLIN HFA, PROAIR HFA Take 1-2 puffs by inhalation every four (4) hours as needed for Wheezing. clotrimazole 1 % topical cream  
Commonly known as:  Sebastian Ano Apply  to affected area two (2) times a day. cyclobenzaprine 5 mg tablet Commonly known as:  FLEXERIL  
 Take 1 Tab by mouth three (3) times daily as needed for Muscle Spasm(s). DUEXIS 800-26.6 mg Tab Generic drug:  ibuprofen-famotidine Take 1 Tab by mouth three (3) times daily as needed. hydroCHLOROthiazide 12.5 mg tablet Commonly known as:  HYDRODIURIL Take 1 Tab by mouth daily. ipratropium 0.02 % nebulizer solution Commonly known as:  ATROVENT  
2.5 mL by Nebulization route as needed for Wheezing.  
  
 magnesium 250 mg Tab Take  by mouth.  
  
 melatonin Tab tablet Take  by mouth nightly. MULTIVITAMIN PO Take  by mouth. Takes one tab po occasionally. omeprazole 20 mg capsule Commonly known as:  PRILOSEC Take 1 Cap by mouth daily. ondansetron hcl 4 mg tablet Commonly known as:  ZOFRAN (AS HYDROCHLORIDE) Take 1 Tab by mouth every eight (8) hours as needed for Nausea. SINGULAIR 10 mg tablet Generic drug:  montelukast  
Take 10 mg by mouth nightly. topiramate 50 mg tablet Commonly known as:  TOPAMAX Take 1 Tab by mouth two (2) times a day. ZOLMitriptan 5 mg disintegrating tablet Commonly known as:  ZOMIG-ZMT Take 1 Tab by mouth as needed for Migraine. Prescriptions Sent to Pharmacy Refills  
 topiramate (TOPAMAX) 50 mg tablet 5 Sig: Take 1 Tab by mouth two (2) times a day. Class: Normal  
 Pharmacy: [facility]/pharmacy #[id number] Julienne Clayman, [address] Ph #: [phone] Route: Oral  
 ZOLMitriptan (ZOMIG-ZMT) 5 mg disintegrating tablet 11 Sig: Take 1 Tab by mouth as needed for Migraine. Class: Normal  
 Pharmacy: [facility]/pharmacy #[id number] Julienne Clayman, [address] Ph #: [phone] Route: Oral  
  
We Performed the Following FLORIAN COMPREHENSIVE PLUS PANEL [EGB03084 Custom] CBC WITH AUTOMATED DIFF [97925 CPT(R)] SED RATE (ESR) [id number] CPT(R)] Follow-up Instructions Return in about 6 months (around 2/7/2018). To-Do List   
 08/10/2017 Neurology:  EEG   
  
 08/30/2017 Neurology:  EMG LIMITED   
  
 08/30/2017 Neurology:  EMG NCV MOTOR WO F/WAVE PER NERVE   
  
 08/30/2017 Neurology:  EMG NCV SENSORY PER NERVE Introducing [facility] SERVICES! Radha Mikes introduces [employer] patient portal. Now you can access parts of your medical record, email your doctor's office, and request medication refills online. 1. In your internet browser, go to [email or web address] 2. Click on the First Time User? Click Here link in the Sign In box. You will see the New Member Sign Up page. 3. Enter your [employer] Access Code exactly as it appears below. You will not need to use this code after youve completed the sign-up process. If you do not sign up before the expiration date, you must request a new code. · [employer] Access Code: [email or web address] Expires: 8/16/2017 10:57 AM 
 
4. Enter the last four digits of your Social Security Number (xxxx) and Date of Birth (mm/dd/yyyy) as indicated and click Submit. You will be taken to the next sign-up page. 5. Create a [employer] ID. This will be your [employer] login ID and cannot be changed, so think of one that is secure and easy to remember. 6. Create a [employer] password. You can change your password at any time. 7. Enter your Password Reset Question and Answer. This can be used at a later time if you forget your password. 8. Enter your e-mail address. You will receive e-mail notification when new information is available in [address] 9. Click Sign Up. You can now view and download portions of your medical record. 10. Click the Download Summary menu link to download a portable copy of your medical information. If you have questions, please visit the Frequently Asked Questions section of the [employer] website. Remember, [employer] is NOT to be used for urgent needs. For medical emergencies, dial 911. Now available from your iPhone and Android! Please provide this summary of care documentation to your next provider. Your primary care clinician is listed as Milagro Castro. If you have any questions after today's visit, please call [phone].

## 2017-08-07 NOTE — LETTER
8/7/2017 10:00 PM 
 
Patient:  Tricia Gallagher YOB: 1980 Date of Visit: 8/7/2017 Dear No Recipients: Thank you for referring Ms. Lorena Holalnd to me for evaluation/treatment. Below are the relevant portions of my assessment and plan of care. Consult REFERRED BY: 
Robert Saleh PA-C 
 
CHIEF COMPLAINT: Headaches Subjective:  
 
Tricia Gallagher is a 40 y.o. right-handed -American female seen today as a new patient for evaluation of chronic daily headaches at the request of Dr. Sharonda Leos. Patient states that she used to get intermittent headaches that were more bitemporal, associated with some nausea and occasional vomiting, and were relatively more severe, and they would occur about 2 times a week. And they went away for several years, but then 4 weeks ago she started getting daily headaches described as a pressure sensation over the head, worse in the morning at times, associated with increased clenching of her teeth, and complaints from her , that did not get better with Imitrex or Maxalt, maybe even Relpax, but no preventive medications have been prescribed. MRI scan was ordered but never obtained due to insurance issues. She has no new focal weakness, sensory loss, other than migratory severe burning pain and numbness in her hands and feet that come and go, usually worse when she is on her feet for prolonged period of time or doing a lot of work, but does not normally wake her up at night like a carpal tunnel. She is a possible borderline diabetic, and also complains of his burning pain seems to be getting slowly more severe. She has a known history of fibromyalgia in addition and chronic stress anxiety and depression. She finds that Fioricet with codeine seems to help her headaches some.   There is no other times a day, certain activities, relation to meals, or any other cause for the headaches. She has had no fever no meningismus and no head trauma. She sleeps poorly at night, and only averages about 2 hours a night. She is not sure why she is sleeping so poorly but does admit to being under increased stress and tension. She has had a slight weight gain recently and has moderate obesity. Her menstrual cycle is irregular. She has no visual loss or blurring of her vision. 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 She seems to have some financial concerns in addition. Past Medical History:  
Diagnosis Date  Acquired hypothyroidism   
 hyperthyroid  Anemia NEC  Arthritis  Asthma  CHF (congestive heart failure) ([facility] [address]  Chronic pain   
 fibromyalgia  Colitis  GERD (gastroesophageal reflux disease)  Ill-defined condition ACL repair on L leg  Other unknown and unspecified cause of morbidity or mortality   
 fibromyalgia  Other unknown and unspecified cause of morbidity or mortality   
 obesity  Pleural effusion associated with pulmonary infection  Psychiatric problem   
 anxiety  PUD (peptic ulcer disease) Past Surgical History:  
Procedure Laterality Date   DELIVERY ONLY    
 x2, one with classical uterine incision  HX  SECTION    
 x3 total  
 HX CHOLECYSTECTOMY  HX HEENT    
 sinus surgery  HX HERNIA REPAIR    
 HX ORTHOPAEDIC ACL repair x2 - twice Family History Problem Relation Age of Onset  Diabetes Father  Heart Attack Father 57 smoker  Hypertension Father  Asthma Father  High Cholesterol Father  Stroke Father  Rashes/Skin Problems Father   
  eczema  Heart Disease Father  Hypertension Mother  Asthma Mother  Hypertension Maternal Grandmother  Stroke Maternal Grandmother  Heart Disease Maternal Grandfather  Diabetes Paternal Grandmother  Dementia Paternal Grandfather  Diabetes Brother  Diabetes Sister  Other Sister   
  ankylosing spondylitis  Hypertension Sister  Other Sister   
  ankylosing spondylitis  Hypertension Brother  Asthma Daughter  Asthma Son  Asthma Son  Asthma Son  Asthma Son  Asthma Son   
  
Social History Substance Use Topics  Smoking status: Never Smoker  Smokeless tobacco: Never Used  Alcohol use No  
   
 
Current Outpatient Prescriptions:  
  topiramate (TOPAMAX) 50 mg tablet, Take 1 Tab by mouth two (2) times a day., Disp: 100 Tab, Rfl: 5 
  ZOLMitriptan (ZOMIG-ZMT) 5 mg disintegrating tablet, Take 1 Tab by mouth as needed for Migraine. , Disp: 12 Tab, Rfl: 11 
  melatonin tab tablet, Take  by mouth nightly., Disp: , Rfl:  
  omeprazole (PRILOSEC) 20 mg capsule, Take 1 Cap by mouth daily. , Disp: 60 Cap, Rfl: 0 
  cyclobenzaprine (FLEXERIL) 5 mg tablet, Take 1 Tab by mouth three (3) times daily as needed for Muscle Spasm(s). , Disp: 20 Tab, Rfl: 0 
  ondansetron hcl (ZOFRAN, AS HYDROCHLORIDE,) 4 mg tablet, Take 1 Tab by mouth every eight (8) hours as needed for Nausea., Disp: 20 Tab, Rfl: 0 
  clotrimazole (LOTRIMIN) 1 % topical cream, Apply  to affected area two (2) times a day., Disp: 15 g, Rfl: 0 
  magnesium 250 mg tab, Take  by mouth., Disp: , Rfl:  
  hydroCHLOROthiazide (HYDRODIURIL) 12.5 mg tablet, Take 1 Tab by mouth daily. (Patient taking differently: Take 25 mg by mouth daily.), Disp: 30 Tab, Rfl: 0 
  ipratropium (ATROVENT) 0.02 % nebulizer solution, 2.5 mL by Nebulization route as needed for Wheezing., Disp: 2.5 mL, Rfl: 11 
  ibuprofen-famotidine (DUEXIS) 800-26.6 mg tab, Take 1 Tab by mouth three (3) times daily as needed. , Disp: , Rfl:  
  MULTIVITAMIN PO, Take  by mouth. Takes one tab po occasionally. , Disp: , Rfl:  
  fluticasone-salmeterol (ADVAIR DISKUS) 250-50 mcg/dose diskus inhaler, Take 1 Puff by inhalation every twelve (12) hours. , Disp: , Rfl:  
   albuterol (PROVENTIL HFA, VENTOLIN HFA) 90 mcg/actuation inhaler, Take 1-2 puffs by inhalation every four (4) hours as needed for Wheezing., Disp: , Rfl:  
  montelukast (SINGULAIR) 10 mg tablet, Take 10 mg by mouth nightly., Disp: , Rfl:  
 
 
 
Allergies Allergen Reactions  Black Pepper Hives, Swelling and Cough  Mushroom Combination No.1 Shortness of Breath, Swelling and Cough  Tomato Hives, Swelling and Cough  Adhesive Tape-Silicones Hives Causes burning at site  Dicyclomine Rash  Dilaudid [Hydromorphone (Bulk)] Itching  Gluten Other (comments) GI upset  Morphine Rash  Tramadol Rash Tolerates percocet Review of Systems: A comprehensive review of systems was negative except for: Constitutional: positive for fatigue and malaise Musculoskeletal: positive for myalgias, arthralgias and stiff joints Neurological: positive for headaches and paresthesia Vitals:  
 08/07/17 1115 BP: 139/87 Pulse: 71 Resp: 12 Temp: 97 °F (36.1 °C) Weight: 276 lb (125.2 kg) Height: 5' 4\" (1.626 m) Objective: I 
 
 
NEUROLOGICAL EXAM: 
 
Appearance: The patient is well developed, well nourished, provides a coherent history and is in no acute distress. Mental Status: Oriented to time, place and person, and the president, cognitive function is normal and speech is fluent and no aphasia or dysarthria. Mood and affect appropriate. Cranial Nerves:   Intact visual fields. Fundi are benign. ADIEL, EOM's full, no nystagmus, no ptosis. Facial sensation is normal. Corneal reflexes are not tested. Facial movement is symmetric. Hearing is normal bilaterally. Palate is midline with normal sternocleidomastoid and trapezius muscles are normal. Tongue is midline. Neck without meningismus or bruits Temporal arteries are not tender or enlarged TMJ areas are mildly tender bilaterally Motor:  5/5 strength in upper and lower proximal and distal muscles. Normal bulk and tone. No fasciculations. Reflexes:   Deep tendon reflexes 1+/4 and symmetrical. 
No babinski or clonus present Sensory:   Normal to touch, pinprick and vibration and temperature mildly decreased in both feet. DSS is intact Gait:  Normal gait. Tremor:   No tremor noted. Cerebellar:  No cerebellar signs present. Neurovascular:  Normal heart sounds and regular rhythm, peripheral pulses intact, and no carotid bruits. Assessment: ICD-10-CM ICD-9-CM 1. Tension vascular headache G44.209 307.81 [facility] COMPREHENSIVE PLUS PANEL  
   CBC WITH AUTOMATED DIFF  
   SED RATE (ESR) EEG  
   topiramate (TOPAMAX) 50 mg tablet ZOLMitriptan (ZOMIG-ZMT) 5 mg disintegrating tablet  
   melatonin tab tablet EMG LIMITED  
   EMG NCV MOTOR WO F/WAVE PER NERVE  
   EMG NCV SENSORY PER NERVE 2. Migraine with aura and without status migrainosus, not intractable G43.109 346.00 [facility] COMPREHENSIVE PLUS PANEL  
   CBC WITH AUTOMATED DIFF  
   SED RATE (ESR) EEG  
   topiramate (TOPAMAX) 50 mg tablet ZOLMitriptan (ZOMIG-ZMT) 5 mg disintegrating tablet  
   melatonin tab tablet EMG LIMITED  
   EMG NCV MOTOR WO F/WAVE PER NERVE  
   EMG NCV SENSORY PER NERVE 3. Idiopathic small and large fiber sensory neuropathy G60.8 356.4 [facility] COMPREHENSIVE PLUS PANEL  
   CBC WITH AUTOMATED DIFF  
   SED RATE (ESR) EEG  
   topiramate (TOPAMAX) 50 mg tablet ZOLMitriptan (ZOMIG-ZMT) 5 mg disintegrating tablet  
   melatonin tab tablet EMG LIMITED  
   EMG NCV MOTOR WO F/WAVE PER NERVE  
   EMG NCV SENSORY PER NERVE 4. B12 deficiency E53.8 266.2 [facility] COMPREHENSIVE PLUS PANEL  
   CBC WITH AUTOMATED DIFF  
   SED RATE (ESR) EEG  
   topiramate (TOPAMAX) 50 mg tablet ZOLMitriptan (ZOMIG-ZMT) 5 mg disintegrating tablet  
   melatonin tab tablet    EMG LIMITED  
   EMG NCV MOTOR WO F/WAVE PER NERVE  
   EMG NCV SENSORY PER NERVE  
 5. Vitamin D deficiency E55.9 268.9 [facility] COMPREHENSIVE PLUS PANEL  
   CBC WITH AUTOMATED DIFF  
   SED RATE (ESR) EEG  
   topiramate (TOPAMAX) 50 mg tablet ZOLMitriptan (ZOMIG-ZMT) 5 mg disintegrating tablet  
   melatonin tab tablet EMG LIMITED  
   EMG NCV MOTOR WO F/WAVE PER NERVE  
   EMG NCV SENSORY PER NERVE 6. Restless leg syndrome G25.81 333.94 [facility] COMPREHENSIVE PLUS PANEL  
   CBC WITH AUTOMATED DIFF  
   SED RATE (ESR) EEG  
   topiramate (TOPAMAX) 50 mg tablet ZOLMitriptan (ZOMIG-ZMT) 5 mg disintegrating tablet  
   melatonin tab tablet EMG LIMITED  
   EMG NCV MOTOR WO F/WAVE PER NERVE  
   EMG NCV SENSORY PER NERVE 7. Diabetic peripheral neuropathy associated with type 2 diabetes mellitus (HCC) E11.42 250.60 [facility] COMPREHENSIVE PLUS PANEL  
  357.2 CBC WITH AUTOMATED DIFF  
   SED RATE (ESR) EEG  
   topiramate (TOPAMAX) 50 mg tablet ZOLMitriptan (ZOMIG-ZMT) 5 mg disintegrating tablet  
   melatonin tab tablet EMG LIMITED  
   EMG NCV MOTOR WO F/WAVE PER NERVE  
   EMG NCV SENSORY PER NERVE 8. Hypothyroidism due to acquired atrophy of thyroid E03.4 244.8 [facility] COMPREHENSIVE PLUS PANEL  
  246.8 CBC WITH AUTOMATED DIFF  
   SED RATE (ESR) EEG  
   topiramate (TOPAMAX) 50 mg tablet ZOLMitriptan (ZOMIG-ZMT) 5 mg disintegrating tablet  
   melatonin tab tablet EMG LIMITED  
   EMG NCV MOTOR WO F/WAVE PER NERVE  
   EMG NCV SENSORY PER NERVE 9. TMJ (temporomandibular joint syndrome) M26.609 524.60 [facility] COMPREHENSIVE PLUS PANEL  
   CBC WITH AUTOMATED DIFF  
   SED RATE (ESR) EEG  
   topiramate (TOPAMAX) 50 mg tablet ZOLMitriptan (ZOMIG-ZMT) 5 mg disintegrating tablet  
   melatonin tab tablet EMG LIMITED  
   EMG NCV MOTOR WO F/WAVE PER NERVE  
   EMG NCV SENSORY PER NERVE 10. Bilateral carpal tunnel syndrome G56.03 354.0 [facility] COMPREHENSIVE PLUS PANEL  
   CBC WITH AUTOMATED DIFF  
   SED RATE (ESR)    EEG  
 topiramate (TOPAMAX) 50 mg tablet ZOLMitriptan (ZOMIG-ZMT) 5 mg disintegrating tablet  
   melatonin tab tablet EMG LIMITED  
   EMG NCV MOTOR WO F/WAVE PER NERVE  
   EMG NCV SENSORY PER NERVE Plan:  
 
Patient seems to have chronic tension vascular headaches now probably related to stress and tension with a past history is more suggestive of episodic migraine The new onset of headache is somewhat worrisome, but we will try her on a preventive agent, and if she fails that she may need an MRI scan or imaging of her brain We will try an EEG for now for the headaches and try her on Topamax, and Zomig for her headaches. For this burning pain in her hands and feet is probably related to her fibromyalgia we will obtain an EMG study to rule out neuropathy, but suspect that since it is worse when she is on her feet for prolonged periods of time is more likely due to musculoskeletal causes and her fibromyalgia. She will continue her muscle relaxant on a as needed basis, and recommended she might see a rheumatologist for her fibromyalgia. She may need something like Elavil if her headaches do not get better help her sleep. We recommended melatonin for now in view of recent article showing that that seems to help migraine headaches. Patient is warned of the side effects of the medication as far as narrow angle glaucoma, kidney stones, weight loss, paresthesias. Complete metabolic panel sent to rule out possible neuropathic symptoms from diabetes, idiopathic neuropathy, or other neuromuscular disease causing her extremity pain and numbness. Also rule out carpal tunnel syndrome even though clinically she has no Tinel's over her median nerves at the wrist. 
We encourage her to exercise regular, try to lose weight, and see if this helps her headaches. Patient did get a mouthguard made by her dentist or oral surgeon for her TMJ problem and continue her Flexeril as needed Follow-up in 3-6 months time or earlier as needed and patient will check her results on my chart, and she may well need an MRI scan Signed By: Moe Mohan MD   
 August 7, 2017 CC: Pawel Garcia PA-C 
FAX: [phone] This note will not be viewable in [address] If you have questions, please do not hesitate to call me. I look forward to following Ms. Montoya along with you. Sincerely, Moe Mohan MD

## 2017-08-08 ENCOUNTER — TELEPHONE (OUTPATIENT)
Dept: NEUROLOGY | Age: 37
End: 2017-08-08

## 2017-08-08 NOTE — TELEPHONE ENCOUNTER
Re: EEG - pt requested to be performed at [facility]. There is no EEG dept at [facility]. Referred to Eliel Drake @ [facility] Matti Mirza - they will call to schedule in [place].

## 2017-08-08 NOTE — PROGRESS NOTES
Consult  REFERRED BY:  Carmen Manzo PA-C    CHIEF COMPLAINT: Headaches      Subjective:     Dennys Olea is a 40 y.o. right-handed -American female seen today as a new patient for evaluation of chronic daily headaches at the request of Dr. Daniella Awad. Patient states that she used to get intermittent headaches that were more bitemporal, associated with some nausea and occasional vomiting, and were relatively more severe, and they would occur about 2 times a week. And they went away for several years, but then 4 weeks ago she started getting daily headaches described as a pressure sensation over the head, worse in the morning at times, associated with increased clenching of her teeth, and complaints from her , that did not get better with Imitrex or Maxalt, maybe even Relpax, but no preventive medications have been prescribed. MRI scan was ordered but never obtained due to insurance issues. She has no new focal weakness, sensory loss, other than migratory severe burning pain and numbness in her hands and feet that come and go, usually worse when she is on her feet for prolonged period of time or doing a lot of work, but does not normally wake her up at night like a carpal tunnel. She is a possible borderline diabetic, and also complains of his burning pain seems to be getting slowly more severe. She has a known history of fibromyalgia in addition and chronic stress anxiety and depression. She finds that Fioricet with codeine seems to help her headaches some. There is no other times a day, certain activities, relation to meals, or any other cause for the headaches. She has had no fever no meningismus and no head trauma. She sleeps poorly at night, and only averages about 2 hours a night. She is not sure why she is sleeping so poorly but does admit to being under increased stress and tension. She has had a slight weight gain recently and has moderate obesity.   Her menstrual cycle is irregular. She has no visual loss or blurring of her vision. 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 She seems to have some financial concerns in addition.     Past Medical History:   Diagnosis Date    Acquired hypothyroidism     hyperthyroid    Anemia NEC     Arthritis     Asthma     CHF (congestive heart failure) (HCC)     Chronic pain     fibromyalgia    Colitis     GERD (gastroesophageal reflux disease)     Ill-defined condition     ACL repair on L leg     Other unknown and unspecified cause of morbidity or mortality     fibromyalgia    Other unknown and unspecified cause of morbidity or mortality     obesity    Pleural effusion associated with pulmonary infection     Psychiatric problem     anxiety    PUD (peptic ulcer disease)       Past Surgical History:   Procedure Laterality Date     DELIVERY ONLY      x2, one with classical uterine incision    HX  SECTION      x3 total    HX CHOLECYSTECTOMY      HX HEENT      sinus surgery    HX HERNIA REPAIR      HX ORTHOPAEDIC      ACL repair x2 - twice     Family History   Problem Relation Age of Onset    Diabetes Father     Heart Attack Father      62 smoker    Hypertension Father     Asthma Father     High Cholesterol Father     Stroke Father     Rashes/Skin Problems Father      eczema    Heart Disease Father     Hypertension Mother     Asthma Mother     Hypertension Maternal Grandmother     Stroke Maternal Grandmother     Heart Disease Maternal Grandfather     Diabetes Paternal Grandmother     Dementia Paternal Grandfather     Diabetes Brother     Diabetes Sister     Other Sister      ankylosing spondylitis    Hypertension Sister     Other Sister      ankylosing spondylitis    Hypertension Brother     Asthma Daughter     Asthma Son     Asthma Son     Asthma Son     Asthma Son     Asthma Son       Social History   Substance Use Topics    Smoking status: Never Smoker    Smokeless tobacco: Never Used    Alcohol use No         Current Outpatient Prescriptions:     topiramate (TOPAMAX) 50 mg tablet, Take 1 Tab by mouth two (2) times a day., Disp: 100 Tab, Rfl: 5    ZOLMitriptan (ZOMIG-ZMT) 5 mg disintegrating tablet, Take 1 Tab by mouth as needed for Migraine. , Disp: 12 Tab, Rfl: 11    melatonin tab tablet, Take  by mouth nightly., Disp: , Rfl:     omeprazole (PRILOSEC) 20 mg capsule, Take 1 Cap by mouth daily. , Disp: 60 Cap, Rfl: 0    cyclobenzaprine (FLEXERIL) 5 mg tablet, Take 1 Tab by mouth three (3) times daily as needed for Muscle Spasm(s). , Disp: 20 Tab, Rfl: 0    ondansetron hcl (ZOFRAN, AS HYDROCHLORIDE,) 4 mg tablet, Take 1 Tab by mouth every eight (8) hours as needed for Nausea., Disp: 20 Tab, Rfl: 0    clotrimazole (LOTRIMIN) 1 % topical cream, Apply  to affected area two (2) times a day., Disp: 15 g, Rfl: 0    magnesium 250 mg tab, Take  by mouth., Disp: , Rfl:     hydroCHLOROthiazide (HYDRODIURIL) 12.5 mg tablet, Take 1 Tab by mouth daily. (Patient taking differently: Take 25 mg by mouth daily.), Disp: 30 Tab, Rfl: 0    ipratropium (ATROVENT) 0.02 % nebulizer solution, 2.5 mL by Nebulization route as needed for Wheezing., Disp: 2.5 mL, Rfl: 11    ibuprofen-famotidine (DUEXIS) 800-26.6 mg tab, Take 1 Tab by mouth three (3) times daily as needed. , Disp: , Rfl:     MULTIVITAMIN PO, Take  by mouth. Takes one tab po occasionally. , Disp: , Rfl:     fluticasone-salmeterol (ADVAIR DISKUS) 250-50 mcg/dose diskus inhaler, Take 1 Puff by inhalation every twelve (12) hours. , Disp: , Rfl:     albuterol (PROVENTIL HFA, VENTOLIN HFA) 90 mcg/actuation inhaler, Take 1-2 puffs by inhalation every four (4) hours as needed for Wheezing., Disp: , Rfl:     montelukast (SINGULAIR) 10 mg tablet, Take 10 mg by mouth nightly., Disp: , Rfl:         Allergies   Allergen Reactions    Black Pepper Hives, Swelling and Cough    Mushroom Combination No.1 Shortness of Breath, Swelling and Cough    Tomato Hives, Swelling and Cough    Adhesive Tape-Silicones Hives     Causes burning at site     Dicyclomine Rash    Dilaudid [Hydromorphone (Bulk)] Itching    Gluten Other (comments)     GI upset    Morphine Rash    Tramadol Rash     Tolerates percocet        Review of Systems:  A comprehensive review of systems was negative except for: Constitutional: positive for fatigue and malaise  Musculoskeletal: positive for myalgias, arthralgias and stiff joints  Neurological: positive for headaches and paresthesia   Vitals:    08/07/17 1115   BP: 139/87   Pulse: 71   Resp: 12   Temp: 97 °F (36.1 °C)   Weight: 276 lb (125.2 kg)   Height: 5' 4\" (1.626 m)     Objective:     I      NEUROLOGICAL EXAM:    Appearance: The patient is well developed, well nourished, provides a coherent history and is in no acute distress. Mental Status: Oriented to time, place and person, and the president, cognitive function is normal and speech is fluent and no aphasia or dysarthria. Mood and affect appropriate. Cranial Nerves:   Intact visual fields. Fundi are benign. ADIEL, EOM's full, no nystagmus, no ptosis. Facial sensation is normal. Corneal reflexes are not tested. Facial movement is symmetric. Hearing is normal bilaterally. Palate is midline with normal sternocleidomastoid and trapezius muscles are normal. Tongue is midline. Neck without meningismus or bruits  Temporal arteries are not tender or enlarged  TMJ areas are mildly tender bilaterally   Motor:  5/5 strength in upper and lower proximal and distal muscles. Normal bulk and tone. No fasciculations. Reflexes:   Deep tendon reflexes 1+/4 and symmetrical.  No babinski or clonus present   Sensory:   Normal to touch, pinprick and vibration and temperature mildly decreased in both feet. DSS is intact   Gait:  Normal gait. Tremor:   No tremor noted. Cerebellar:  No cerebellar signs present.    Neurovascular:  Normal heart sounds and regular rhythm, peripheral pulses intact, and no carotid bruits. Assessment:       ICD-10-CM ICD-9-CM    1. Tension vascular headache G44.209 307.81 [facility] [employer]US PANEL      CBC WITH AUTOMATED DIFF      SED RATE (ESR)      EEG      topiramate (TOPAMAX) 50 mg tablet      ZOLMitriptan (ZOMIG-ZMT) 5 mg disintegrating tablet      melatonin tab tablet      EMG LIMITED      EMG NCV MOTOR WO F/WAVE PER NERVE      EMG NCV SENSORY PER NERVE   2. Migraine with aura and without status migrainosus, not intractable G43.109 346.00 [facility] [employer]US PANEL      CBC WITH AUTOMATED DIFF      SED RATE (ESR)      EEG      topiramate (TOPAMAX) 50 mg tablet      ZOLMitriptan (ZOMIG-ZMT) 5 mg disintegrating tablet      melatonin tab tablet      EMG LIMITED      EMG NCV MOTOR WO F/WAVE PER NERVE      EMG NCV SENSORY PER NERVE   3. Idiopathic small and large fiber sensory neuropathy G60.8 356.4 [facility] [employer]US PANEL      CBC WITH AUTOMATED DIFF      SED RATE (ESR)      EEG      topiramate (TOPAMAX) 50 mg tablet      ZOLMitriptan (ZOMIG-ZMT) 5 mg disintegrating tablet      melatonin tab tablet      EMG LIMITED      EMG NCV MOTOR WO F/WAVE PER NERVE      EMG NCV SENSORY PER NERVE   4. B12 deficiency E53.8 266.2 [facility] [employer]US PANEL      CBC WITH AUTOMATED DIFF      SED RATE (ESR)      EEG      topiramate (TOPAMAX) 50 mg tablet      ZOLMitriptan (ZOMIG-ZMT) 5 mg disintegrating tablet      melatonin tab tablet      EMG LIMITED      EMG NCV MOTOR WO F/WAVE PER NERVE      EMG NCV SENSORY PER NERVE   5. Vitamin D deficiency E55.9 268.9 [facility] [employer] PANEL      CBC WITH AUTOMATED DIFF      SED RATE (ESR)      EEG      topiramate (TOPAMAX) 50 mg tablet      ZOLMitriptan (ZOMIG-ZMT) 5 mg disintegrating tablet      melatonin tab tablet      EMG LIMITED      EMG NCV MOTOR WO F/WAVE PER NERVE      EMG NCV SENSORY PER NERVE   6.  Restless leg syndrome G25.81 333.94 [facility] [employer]US PANEL      CBC WITH AUTOMATED DIFF SED RATE (ESR)      EEG      topiramate (TOPAMAX) 50 mg tablet      ZOLMitriptan (ZOMIG-ZMT) 5 mg disintegrating tablet      melatonin tab tablet      EMG LIMITED      EMG NCV MOTOR WO F/WAVE PER NERVE      EMG NCV SENSORY PER NERVE   7. Diabetic peripheral neuropathy associated with type 2 diabetes mellitus (HCC) E11.42 250.60 [facility] [employer] PANEL     357.2 CBC WITH AUTOMATED DIFF      SED RATE (ESR)      EEG      topiramate (TOPAMAX) 50 mg tablet      ZOLMitriptan (ZOMIG-ZMT) 5 mg disintegrating tablet      melatonin tab tablet      EMG LIMITED      EMG NCV MOTOR WO F/WAVE PER NERVE      EMG NCV SENSORY PER NERVE   8. Hypothyroidism due to acquired atrophy of thyroid E03.4 244.8 [facility] [employer] [employer] PANEL     246.8 CBC WITH AUTOMATED DIFF      SED RATE (ESR)      EEG      topiramate (TOPAMAX) 50 mg tablet      ZOLMitriptan (ZOMIG-ZMT) 5 mg disintegrating tablet      melatonin tab tablet      EMG LIMITED      EMG NCV MOTOR WO F/WAVE PER NERVE      EMG NCV SENSORY PER NERVE   9.  TMJ (temporomandibular joint syndrome) M26.609 524.60 [facility] [employer] [employer] PANEL      CBC WITH AUTOMATED DIFF      SED RATE (ESR)      EEG      topiramate (TOPAMAX) 50 mg tablet      ZOLMitriptan (ZOMIG-ZMT) 5 mg disintegrating tablet      melatonin tab tablet      EMG LIMITED      EMG NCV MOTOR WO F/WAVE PER NERVE      EMG NCV SENSORY PER NERVE   10. Bilateral carpal tunnel syndrome G56.03 354.0 [facility] [employer] [employer] PANEL      CBC WITH AUTOMATED DIFF      SED RATE (ESR)      EEG      topiramate (TOPAMAX) 50 mg tablet      ZOLMitriptan (ZOMIG-ZMT) 5 mg disintegrating tablet      melatonin tab tablet      EMG LIMITED      EMG NCV MOTOR WO F/WAVE PER NERVE      EMG NCV SENSORY PER NERVE         Plan:     Patient seems to have chronic tension vascular headaches now probably related to stress and tension with a past history is more suggestive of episodic migraine  The new onset of headache is somewhat worrisome, but we will try her on a preventive agent, and if she fails that she may need an MRI scan or imaging of her brain  We will try an EEG for now for the headaches and try her on Topamax, and Zomig for her headaches. For this burning pain in her hands and feet is probably related to her fibromyalgia we will obtain an EMG study to rule out neuropathy, but suspect that since it is worse when she is on her feet for prolonged periods of time is more likely due to musculoskeletal causes and her fibromyalgia. She will continue her muscle relaxant on a as needed basis, and recommended she might see a rheumatologist for her fibromyalgia. She may need something like Elavil if her headaches do not get better help her sleep. We recommended melatonin for now in view of recent article showing that that seems to help migraine headaches. Patient is warned of the side effects of the medication as far as narrow angle glaucoma, kidney stones, weight loss, paresthesias. Complete metabolic panel sent to rule out possible neuropathic symptoms from diabetes, idiopathic neuropathy, or other neuromuscular disease causing her extremity pain and numbness. Also rule out carpal tunnel syndrome even though clinically she has no Tinel's over her median nerves at the wrist.  We encourage her to exercise regular, try to lose weight, and see if this helps her headaches. Patient did get a mouthguard made by her dentist or oral surgeon for her TMJ problem and continue her Flexeril as needed  Follow-up in 3-6 months time or earlier as needed and patient will check her results on my chart, and she may well need an MRI scan    Signed By: Keerthi Escamilla MD     August 7, 2017       CC: Bernard Belle PA-C  FAX: [phone]    This note will not be viewable in [address]

## 2017-08-17 ENCOUNTER — OFFICE VISIT (OUTPATIENT)
Dept: FAMILY MEDICINE CLINIC | Age: 37
End: 2017-08-17

## 2017-08-17 ENCOUNTER — DOCUMENTATION ONLY (OUTPATIENT)
Dept: FAMILY MEDICINE CLINIC | Age: 37
End: 2017-08-17

## 2017-08-17 VITALS
DIASTOLIC BLOOD PRESSURE: 92 MMHG | TEMPERATURE: 96.8 F | OXYGEN SATURATION: 93 % | SYSTOLIC BLOOD PRESSURE: 138 MMHG | WEIGHT: 274 LBS | RESPIRATION RATE: 18 BRPM | BODY MASS INDEX: 46.78 KG/M2 | HEART RATE: 82 BPM | HEIGHT: 64 IN

## 2017-08-17 DIAGNOSIS — R73.03 PRE-DIABETES: ICD-10-CM
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
## 2017-08-17 DIAGNOSIS — R73.09 ELEVATED HEMOGLOBIN A1C: ICD-10-CM

## 2017-08-17 DIAGNOSIS — Z13.220 LIPID SCREENING: ICD-10-CM

## 2017-08-17 DIAGNOSIS — L50.9 HIVES: ICD-10-CM

## 2017-08-17 DIAGNOSIS — I10 ESSENTIAL HYPERTENSION: Primary | ICD-10-CM

## 2017-08-17 DIAGNOSIS — R06.09 DOE (DYSPNEA ON EXERTION): ICD-10-CM

## 2017-08-17 RX ORDER — DEXLANSOPRAZOLE 60 MG/1
CAPSULE, DELAYED RELEASE ORAL DAILY
COMMUNITY

## 2017-08-17 RX ORDER — PHENTERMINE HYDROCHLORIDE 30 MG/1
30 CAPSULE ORAL
COMMUNITY
End: 2018-03-08

## 2017-08-17 RX ORDER — HYDROCHLOROTHIAZIDE 25 MG/1
25 TABLET ORAL DAILY
Qty: 30 TAB | Refills: 5 | Status: SHIPPED | OUTPATIENT
Start: 2017-08-17 | End: 2018-03-08

## 2017-08-17 NOTE — PROGRESS NOTES
Patient has orders for a treadmill stress test #[id number] and after calling the patient's insurance no auth required

## 2017-08-17 NOTE — PROGRESS NOTES
Miryam Walker is a 40 y.o. female who presents to the office today with the following:  Chief Complaint   Patient presents with    Follow-up     BP meds and RMSF  (patient mentioned that she is somewhat SOB when walking)       HPI  Started on HCTZ 12.5 mg for hypertension ~ 1 month ago. Did increase to 25 mg daily, actually ran out 1 week ago. Tolerating well with no SEs. Says she had \"too much going on\" and forgot to ask for refills. Had missed multiple scheduled f/u apts as was seeing specialists and had ER visit since last was seen here. Hx RUQ/epigastric pain, GERD, Hiatal hernia. US demonstrated mild hepatomegaly, otherwise unremarkable. Also [facility] ER for abd pain with neg CT abd/pelvis. Referred to GI. Seen by GI DrRaven Swanson. Recent endoscopy and colonoscopy. Only findings of small internal hemorrhoid and confirmed hiatal hernia, recommend surgery. Referred back to her general surgeon Dr. Krystal King for potential surgery. However, was told he would no longer perform the sx with pt current BMI unless also wanted bariatric sx- pt upset with way apt went and left as she is not interested in gastric sx. Says he was willing to do before, last yr, but never went through with sx. She c/o ongoing gagging/coughing/joking at night from GERD/haital hernia. Concerned she will aspirate. Also previously dx and tx for RMSF. Difficulty tolerating Doxy- but was able to complete all 14 days, just took with Benadryl and was able to tolerate. Also thinks she developed alpha-gal allergy as developed hives after eating beef and pork since tick bite. Wants to be tested today. Also reports ongoing RIVER. Feels this has worsened in past several months or so. Denies any chest pain, but has had in the past with full neg work-up in ER, last ~2015, nl EKGs and Echo. Has had numerous EKGs in past 3 years. Hx of asthma, uses Advair and albuterol daily. Does not really seem to help with RIVER.   Once in ER was told maybe CHF with LE swelling and SOB- but \"everyone since then says I do not have,\" still in [address]. Previously not concerned as attributed to weight/deconditioned/hiatal hernia. Has discussed sxs with Dr. Thania Akhtar w/ [facility]. Rec allergy shots and told her the sob was likely due to her hernia, not asthma/allergies. Denies any chest pain or palpitations, but does admit to occasional dizziness like room spinning and lightheadedness. Also at times LE edema in on feet for prolonged prds of time. Dizziness mainly with position change, such as bending over. Lightheadedness may occur with or w/o the RIVER. Denies any syncopal episodes, diaphoresis, or other assoc sxs. She notes was cleared by cardiology during initial pre-op when considering hiatal hernia repair last year. Does not think she ever had stress test.    Hx pre-DM. Lab Results   Component Value Date/Time    Hemoglobin A1c 5.8 12/09/2016 08:52 AM   Has been in pre-diab range. Also has hx gestational diabetes. Going to Dr. Cyndee Guerin for wt loss program.   Has not been to dietician. Saw Dr. Olena Buckley Neurology. Has EEG and EMG pending for burning in feet w/ exercise. Otherwise overall feeling okay today, at baseline, no other complaints or new/acute concerns. Review of Systems   Constitutional: Negative for fever, malaise/fatigue and weight loss. HENT: Negative for hearing loss and tinnitus. Respiratory: Negative for cough, shortness of breath (except as noted in HPI) and wheezing. Cardiovascular: Negative for chest pain, palpitations and orthopnea. Gastrointestinal: Positive for abdominal pain, nausea and vomiting. Negative for constipation and diarrhea. Genitourinary: Negative. Musculoskeletal: Negative for myalgias. Skin: Negative for rash. Neurological: Positive for dizziness.  Negative for tingling, sensory change, speech change, focal weakness, loss of consciousness and headaches (resolved, going to Neuro). See HPI. Allergies   Allergen Reactions    Black Pepper Hives, Swelling and Cough    Mushroom Combination No.1 Shortness of Breath, Swelling and Cough    Tomato Hives, Swelling and Cough    Adhesive Tape-Silicones Hives     Causes burning at site     Dicyclomine Rash    Dilaudid [Hydromorphone (Bulk)] Itching    Gluten Other (comments)     GI upset    Morphine Rash    Tramadol Rash     Tolerates percocet       Current Outpatient Prescriptions   Medication Sig    phentermine 30 mg capsule Take 30 mg by mouth every morning.  Dexlansoprazole (DEXILANT) 60 mg CpDB Take  by mouth.  plecanatide (TRULANCE) 3 mg tab Take  by mouth.  hydroCHLOROthiazide (HYDRODIURIL) 25 mg tablet Take 1 Tab by mouth daily.  topiramate (TOPAMAX) 50 mg tablet Take 1 Tab by mouth two (2) times a day.  ZOLMitriptan (ZOMIG-ZMT) 5 mg disintegrating tablet Take 1 Tab by mouth as needed for Migraine.  melatonin tab tablet Take  by mouth nightly.  ondansetron hcl (ZOFRAN, AS HYDROCHLORIDE,) 4 mg tablet Take 1 Tab by mouth every eight (8) hours as needed for Nausea.  clotrimazole (LOTRIMIN) 1 % topical cream Apply  to affected area two (2) times a day.  magnesium 250 mg tab Take  by mouth.  ipratropium (ATROVENT) 0.02 % nebulizer solution 2.5 mL by Nebulization route as needed for Wheezing.  ibuprofen-famotidine (DUEXIS) 800-26.6 mg tab Take 1 Tab by mouth three (3) times daily as needed.  MULTIVITAMIN PO Take  by mouth. Takes one tab po occasionally.  fluticasone-salmeterol (ADVAIR DISKUS) 250-50 mcg/dose diskus inhaler Take 1 Puff by inhalation every twelve (12) hours.  albuterol (PROVENTIL HFA, VENTOLIN HFA) 90 mcg/actuation inhaler Take 1-2 puffs by inhalation every four (4) hours as needed for Wheezing.  montelukast (SINGULAIR) 10 mg tablet Take 10 mg by mouth nightly.  omeprazole (PRILOSEC) 20 mg capsule Take 1 Cap by mouth daily.     cyclobenzaprine (FLEXERIL) 5 mg tablet Take 1 Tab by mouth three (3) times daily as needed for Muscle Spasm(s). No current facility-administered medications for this visit.         Past Medical History:   Diagnosis Date    Acquired hypothyroidism     hyperthyroid    Anemia NEC     Arthritis     Asthma     CHF (congestive heart failure) (HCC)     Chronic pain     fibromyalgia    Colitis     GERD (gastroesophageal reflux disease)     Ill-defined condition     ACL repair on L leg     Other unknown and unspecified cause of morbidity or mortality     fibromyalgia    Other unknown and unspecified cause of morbidity or mortality     obesity    Pleural effusion associated with pulmonary infection     Psychiatric problem     anxiety    PUD (peptic ulcer disease)        Past Surgical History:   Procedure Laterality Date     DELIVERY ONLY      x2, one with classical uterine incision    HX  SECTION      x3 total    HX CHOLECYSTECTOMY      HX HEENT      sinus surgery    HX HERNIA REPAIR      HX ORTHOPAEDIC      ACL repair x2 - twice       Social History     Social History    Marital status:      Spouse name: N/A    Number of children: N/A    Years of education: N/A     Social History Main Topics    Smoking status: Never Smoker    Smokeless tobacco: Never Used    Alcohol use No    Drug use: No    Sexual activity: Yes     Partners: Male     Birth control/ protection: None     Other Topics Concern    None     Social History Narrative       Family History   Problem Relation Age of Onset    Diabetes Father     Heart Attack Father      62 smoker    Hypertension Father     Asthma Father     High Cholesterol Father     Stroke Father     Rashes/Skin Problems Father      eczema    Heart Disease Father     Hypertension Mother     Asthma Mother     Hypertension Maternal Grandmother     Stroke Maternal Grandmother     Heart Disease Maternal Grandfather     Diabetes Paternal Grandmother     Dementia Paternal Grandfather     Diabetes Brother     Diabetes Sister     Other Sister      ankylosing spondylitis    Hypertension Sister     Other Sister      ankylosing spondylitis    Hypertension Brother     Asthma Daughter     Asthma Son     Asthma Son     Asthma Son     Asthma Son     Asthma Son          Physical Exam:  Visit Vitals    BP (!) 138/92 (BP 1 Location: Left arm, BP Patient Position: Sitting)    Pulse 82    Temp 96.8 °F (36 °C) (Temporal)    Resp 18    Ht 5' 4\" (1.626 m)    Wt 274 lb (124.3 kg)    SpO2 93%    BMI 47.03 kg/m2     Physical Exam   Constitutional: She is oriented to person, place, and time and well-developed, well-nourished, and in no distress. Morbidly obese AAF   HENT:   Head: Normocephalic and atraumatic. Right Ear: External ear normal.   Left Ear: External ear normal.   Nose: Nose normal.   Mouth/Throat: Oropharynx is clear and moist.   Eyes: Conjunctivae and EOM are normal.   Neck: Normal range of motion. Neck supple. Cardiovascular: Normal rate, regular rhythm, normal heart sounds and intact distal pulses. Pulmonary/Chest: Effort normal and breath sounds normal.   Abdominal: Soft. She exhibits no distension and no mass. There is tenderness (very mild with deep palpation RUQ still, no worsening, pt says mild discomfort). There is no rebound and no guarding. Musculoskeletal: She exhibits no edema or tenderness. Lymphadenopathy:     She has no cervical adenopathy. Neurological: She is alert and oriented to person, place, and time. She has normal motor skills, normal sensation, normal strength and normal reflexes. No cranial nerve deficit. Gait normal.   Skin: Skin is warm and dry. Psychiatric: Mood and affect normal.   Nursing note and vitals reviewed. Assessment/Plan:    ICD-10-CM ICD-9-CM    1.  Essential hypertension I10 401.9 hydroCHLOROthiazide (HYDRODIURIL) 25 mg tablet      LIPID PANEL      RI HANDLG&/OR CONVEY OF SPEC FOR TR OFFICE TO LAB OK COLLECTION VENOUS BLOOD,VENIPUNCTURE   2. Pre-diabetes R73.03 790.29 HEMOGLOBIN A1C WITH EAG   3. Elevated hemoglobin A1c R73.09 790.29 HEMOGLOBIN A1C WITH EAG   4. RIVER (dyspnea on exertion) R06.09 786.09 XR CHEST PA LAT      STRESS TEST CARDIAC   5. Lipid screening Z13.220 V77.91 LIPID PANEL   6. Hives L50.9 708.9 ALPHA-GAL FOOD PANEL   7. Tick bite, subsequent encounter W57. XXXD E906.4 ALPHA-GAL FOOD PANEL     Resume BP med and cont to monitor, keep log for f/u. Labs pending. Reiterate LMs. F/u with other specialists as planned. Declines cardiology referral at this time, but agrees to ETT. Would like to consider later on, as she has a lot going on right now with f/u visits/specalists visits. .etc.    Total time spent with the patient today was 25 minutes of which more than 50% was spent face to face with the patient. Follow-up Disposition:  Return in about 1 month (around 9/17/2017), or sooner if symptoms worsen or fail to improve, for f/u BP. To seek immediate med attn/to ER for any red flag sxs in interim.     Donato Bowers PA-C

## 2017-08-17 NOTE — MR AVS SNAPSHOT
Visit Information Date & Time Provider Department Dept. Phone Encounter #  
 8/17/2017  2:00 PM Deon Stone PA-C [address] [id number] Your Appointments 9/13/2017 10:00 AM  
PROCEDURE with Delaney Mccarty MD  
[facility] at [facility]) Appt Note: procedure EMG VIC arms and legs $ 0 CP jll 8/7/17  
 [address], 
[address] [facility], [address] [phone]  
[address] , [address], [address] [address] Upcoming Health Maintenance Date Due  
 FOOT EXAM Q1 1/23/1991 MICROALBUMIN Q1 1/23/1991 EYE EXAM RETINAL OR DILATED Q1 1/23/1991 DTaP/Tdap/Td series (1 - Tdap) 1/23/2002 PAP AKA CERVICAL CYTOLOGY 1/23/2002 HEMOGLOBIN A1C Q6M 6/9/2017 INFLUENZA AGE 9 TO ADULT 8/1/2017 LIPID PANEL Q1 11/21/2017 Allergies as of 8/17/2017  Review Complete On: 8/17/2017 By: Deon Stone PA-C Severity Noted Reaction Type Reactions Black Pepper Medium 09/15/2014    Hives, Swelling, Cough Mushroom Combination No.1 Medium 09/15/2014    Shortness of Breath, Swelling, Cough Tomato Medium 09/15/2014    Hives, Swelling, Cough Adhesive Tape-silicones  06/01/7702   Topical Hives Causes burning at site Dicyclomine  07/27/2017    Rash Dilaudid [Hydromorphone (Bulk)]  09/16/2014    Itching Gluten Low 06/13/2013    Other (comments) GI upset Morphine Low 01/17/2011   Side Effect Rash Tramadol Low 01/17/2011   Side Effect Rash Tolerates percocet Current Immunizations  Reviewed on 9/22/2014 Name Date Influenza Vaccine PF 10/1/2014  6:06 PM  
 Pneumococcal Polysaccharide (PPSV-23) 10/1/2014  6:08 PM  
  
 Not reviewed this visit You Were Diagnosed With   
  
 Codes Comments Essential hypertension    -  Primary ICD-10-CM: I10 
ICD-9-CM: 401.9  RIVER (dyspnea on exertion)     ICD-10-CM: R06.09 
 ICD-9-CM: 786.09 Pre-diabetes     ICD-10-CM: R73.03 
ICD-9-CM: 790.29 Elevated hemoglobin A1c     ICD-10-CM: R73.09 
ICD-9-CM: 790.29 Lipid screening     ICD-10-CM: F69.238 ICD-9-CM: V77.91 Hives     ICD-10-CM: L50.9 ICD-9-CM: 708. 9 Vitals BP Pulse Temp Resp Height(growth percentile) Weight(growth percentile) (!) 138/92 (BP 1 Location: Left arm, BP Patient Position: Sitting) 82 96.8 °F (36 °C) (Temporal) 18 5' 4\" (1.626 m) 274 lb (124.3 kg) SpO2 BMI OB Status Smoking Status 93% 47.03 kg/m2 Having regular periods Never Smoker Vitals History BMI and BSA Data Body Mass Index Body Surface Area 47.03 kg/m 2 2.37 m 2 Preferred Pharmacy Pharmacy Name Phone [id number], [address] [phone] Your Updated Medication List  
  
   
This list is accurate as of: 8/17/17  3:20 PM.  Always use your most recent med list.  
  
  
  
  
 Marlena Menendez 250-50 mcg/dose diskus inhaler Generic drug:  fluticasone-salmeterol Take 1 Puff by inhalation every twelve (12) hours. albuterol 90 mcg/actuation inhaler Commonly known as:  PROVENTIL HFA, VENTOLIN HFA, PROAIR HFA Take 1-2 puffs by inhalation every four (4) hours as needed for Wheezing. clotrimazole 1 % topical cream  
Commonly known as:  Charlotte Phoenix Apply  to affected area two (2) times a day. cyclobenzaprine 5 mg tablet Commonly known as:  FLEXERIL Take 1 Tab by mouth three (3) times daily as needed for Muscle Spasm(s). DEXILANT 60 mg Cpdb Generic drug:  Dexlansoprazole Take  by mouth. DUEXIS 800-26.6 mg Tab Generic drug:  ibuprofen-famotidine Take 1 Tab by mouth three (3) times daily as needed. hydroCHLOROthiazide 25 mg tablet Commonly known as:  HYDRODIURIL Take 1 Tab by mouth daily. ipratropium 0.02 % nebulizer solution Commonly known as:  ATROVENT  
 2.5 mL by Nebulization route as needed for Wheezing.  
  
 magnesium 250 mg Tab Take  by mouth.  
  
 melatonin Tab tablet Take  by mouth nightly. MULTIVITAMIN PO Take  by mouth. Takes one tab po occasionally. omeprazole 20 mg capsule Commonly known as:  PRILOSEC Take 1 Cap by mouth daily. ondansetron hcl 4 mg tablet Commonly known as:  ZOFRAN (AS HYDROCHLORIDE) Take 1 Tab by mouth every eight (8) hours as needed for Nausea. phentermine 30 mg capsule Take 30 mg by mouth every morning. SINGULAIR 10 mg tablet Generic drug:  montelukast  
Take 10 mg by mouth nightly. topiramate 50 mg tablet Commonly known as:  TOPAMAX Take 1 Tab by mouth two (2) times a day. TRULANCE 3 mg Tab Generic drug:  plecanatide Take  by mouth. ZOLMitriptan 5 mg disintegrating tablet Commonly known as:  ZOMIG-ZMT Take 1 Tab by mouth as needed for Migraine. Prescriptions Sent to Pharmacy Refills  
 hydroCHLOROthiazide (HYDRODIURIL) 25 mg tablet 5 Sig: Take 1 Tab by mouth daily. Class: Normal  
 Pharmacy: [facility]/pharmacy #[id number] Gene Blocker, [facility] [address] Ph #: [phone] Route: Oral  
  
We Performed the Following ALPHA-GAL FOOD PANEL [76722 CPT(R)] HEMOGLOBIN A1C WITH EAG [50611 CPT(R)] LIPID PANEL [92510 CPT(R)] To-Do List   
 08/17/2017 Imaging:  XR CHEST PA LAT   
  
 08/21/2017 1:00 PM  
  Appointment with EEG TECH [address] [place] [address] at [facility] ([phone]) The patient's hair must be clean without oils, mousse, and spray, no keyshawn or metal in the hair. 08/24/2017 ECG:  STRESS TEST CARDIAC Referral Information Referral ID Referred By Referred To  
  
 [id number] Rafi DUMONT Not Available Visits Status Start Date End Date 1 New Request 8/17/17 8/17/18  If your referral has a status of pending review or denied, additional information will be sent to support the outcome of this decision. Introducing [facility]! Ramírez Marcus introduces [employer] patient portal. Now you can access parts of your medical record, email your doctor's office, and request medication refills online. 1. In your internet browser, go to [email or web address] 2. Click on the First Time User? Click Here link in the Sign In box. You will see the New Member Sign Up page. 3. Enter your [employer] Access Code exactly as it appears below. You will not need to use this code after youve completed the sign-up process. If you do not sign up before the expiration date, you must request a new code. · [employer] Access Code: [email or web address] Expires: 11/15/2017  1:58 PM 
 
4. Enter the last four digits of your Social Security Number (xxxx) and Date of Birth (mm/dd/yyyy) as indicated and click Submit. You will be taken to the next sign-up page. 5. Create a [employer] ID. This will be your [employer] login ID and cannot be changed, so think of one that is secure and easy to remember. 6. Create a [employer] password. You can change your password at any time. 7. Enter your Password Reset Question and Answer. This can be used at a later time if you forget your password. 8. Enter your e-mail address. You will receive e-mail notification when new information is available in [address] 9. Click Sign Up. You can now view and download portions of your medical record. 10. Click the Download Summary menu link to download a portable copy of your medical information. If you have questions, please visit the Frequently Asked Questions section of the [employer] website. Remember, [employer] is NOT to be used for urgent needs. For medical emergencies, dial 911. Now available from your iPhone and Android! Please provide this summary of care documentation to your next provider. Your primary care clinician is listed as Shagufta Mann.  If you have any questions after today's visit, please call [phone].

## 2017-08-17 NOTE — PROGRESS NOTES
Visit Vitals    BP (!) 138/92 (BP 1 Location: Left arm, BP Patient Position: Sitting)    Pulse 82    Temp 96.8 °F (36 °C) (Temporal)    Resp 18    Ht 5' 4\" (1.626 m)    Wt 274 lb (124.3 kg)    SpO2 93%    BMI 47.03 kg/m2     Chief Complaint   Patient presents with    Follow-up     BP meds and RMSF     Patient mentioned SOB when walking.   Hina Means, LPN

## 2017-08-18 ENCOUNTER — TELEPHONE (OUTPATIENT)
Dept: NEUROLOGY | Age: 37
End: 2017-08-18

## 2017-08-18 NOTE — TELEPHONE ENCOUNTER
Left message would send to Dr Missy Arredondo. Per notes, she already had tingling in the arms.  Unsure about the facial feelings    Please advise if any changes are to be made with medications

## 2017-08-18 NOTE — TELEPHONE ENCOUNTER
Patient thinks that she maybe having a reaction from the topiramate,arms and wrist tingling face feels like something is crawling on it

## 2017-08-19 NOTE — TELEPHONE ENCOUNTER
Gulshan Kirk, can you call the patient Monday and tell her stop the medication, I tried to call the first number and has not her number, the second number her number is not accepting calls  Tell her stop the medicine

## 2017-08-21 ENCOUNTER — HOSPITAL ENCOUNTER (OUTPATIENT)
Dept: NEUROLOGY | Age: 37
Discharge: HOME | End: 2017-08-21
Attending: PSYCHIATRY & NEUROLOGY
Payer: COMMERCIAL

## 2017-08-21 DIAGNOSIS — E55.9 VITAMIN D DEFICIENCY: ICD-10-CM

## 2017-08-21 DIAGNOSIS — G60.8 IDIOPATHIC SMALL AND LARGE FIBER SENSORY NEUROPATHY: ICD-10-CM

## 2017-08-21 DIAGNOSIS — E03.4 HYPOTHYROIDISM DUE TO ACQUIRED ATROPHY OF THYROID: ICD-10-CM

## 2017-08-21 DIAGNOSIS — E53.8 B12 DEFICIENCY: ICD-10-CM

## 2017-08-21 DIAGNOSIS — G44.209 TENSION VASCULAR HEADACHE: ICD-10-CM

## 2017-08-21 DIAGNOSIS — G25.81 RESTLESS LEG SYNDROME: ICD-10-CM

## 2017-08-21 DIAGNOSIS — E11.42 DIABETIC PERIPHERAL NEUROPATHY ASSOCIATED WITH TYPE 2 DIABETES MELLITUS (HCC): ICD-10-CM

## 2017-08-21 DIAGNOSIS — G43.109 MIGRAINE WITH AURA AND WITHOUT STATUS MIGRAINOSUS, NOT INTRACTABLE: ICD-10-CM

## 2017-08-21 DIAGNOSIS — M26.609 TMJ (TEMPOROMANDIBULAR JOINT SYNDROME): ICD-10-CM

## 2017-08-21 DIAGNOSIS — G56.03 BILATERAL CARPAL TUNNEL SYNDROME: ICD-10-CM

## 2017-08-21 PROCEDURE — 95816 EEG AWAKE AND DROWSY: CPT

## 2017-08-22 LAB
ALPHA-GAL IGE QN: 0.55 KU/L
BEEF IGE QN: 0.22 KU/L
CHOLEST SERPL-MCNC: 145 MG/DL (ref 100–199)
DEPRECATED BEEF IGE RAST QL: ABNORMAL
DEPRECATED LAMB IGE RAST QL: 0
DEPRECATED PORK IGE RAST QL: 0
EST. AVERAGE GLUCOSE BLD GHB EST-MCNC: 120 MG/DL
HBA1C MFR BLD: 5.8 % (ref 4.8–5.6)
HDLC SERPL-MCNC: 44 MG/DL
INTERPRETATION, 910389: NORMAL
LAMB IGE QN: <0.1 KU/L
LDLC SERPL CALC-MCNC: 81 MG/DL (ref 0–99)
PORK IGE QN: <0.1 KU/L
TRIGL SERPL-MCNC: 100 MG/DL (ref 0–149)
VLDLC SERPL CALC-MCNC: 20 MG/DL (ref 5–40)

## 2017-08-22 NOTE — PROGRESS NOTES
Notify pt her lipid panel looks good. Her a1c is same as 8mo ago, but still in predm range. Rec continue to work on low conc sweets/carbs in diet and regular exercise when feeling better. Also notify her alpha-gal IGE test did come back a little elevated as suspected, so should avoid hoofed animal meats in diet as well (ie beef/lamp/mutton/pork) as is at risk for allergic rxn.

## 2017-08-31 NOTE — PROCEDURES
Patient Name: Keyshawn Carlos  : 1980  Age: 40 y.o. Ordering physician: Fer Will  Date of EE2017  EEG procedure number: [id number]  Diagnosis: Headaches  Interpreting physician: Jacklynn Sicard, MD      ELECTROENCEPHALOGRAM REPORT     PROCEDURE: EEG. CLINICAL INDICATION: The patient is a 40 y.o. female who is being evaluated for baseline electro cerebral activities and to rule out seizure focus. EEG CLASSIFICATION: Essentially normal awake and asleep. DESCRIPTION OF THE RECORD:   The background of this recording contains a posteriorly-located occipital alpha rhythm of 9 Hz that attenuates with eye opening. Throughout the recording, there were no clear areas of focal slowing nor spike or spike-and-wave discharges seen. Hyperventilation produced no changes. Photic stimulation produced no response in the posterior head regions. During the recording the patient did not achieve stage II sleep    INTERPRETATION: This is a normal electroencephalogram with the patient awake, showing no clear focal abnormalities or epileptiform activity. A normal EEG doesn't not rule out seizures. Clinical correlation recommended.       Jacklynn Sicard, MD  Neurologist

## 2017-10-25 ENCOUNTER — OFFICE VISIT (OUTPATIENT)
Dept: NEUROLOGY | Age: 37
End: 2017-10-25

## 2017-10-25 VITALS — OXYGEN SATURATION: 96 % | DIASTOLIC BLOOD PRESSURE: 90 MMHG | SYSTOLIC BLOOD PRESSURE: 141 MMHG | HEART RATE: 79 BPM

## 2017-10-25 DIAGNOSIS — E11.42 DIABETIC PERIPHERAL NEUROPATHY ASSOCIATED WITH TYPE 2 DIABETES MELLITUS (HCC): ICD-10-CM

## 2017-10-25 DIAGNOSIS — M54.16 LUMBAR BACK PAIN WITH RADICULOPATHY AFFECTING LEFT LOWER EXTREMITY: ICD-10-CM

## 2017-10-25 DIAGNOSIS — G60.8 IDIOPATHIC SMALL AND LARGE FIBER SENSORY NEUROPATHY: Primary | ICD-10-CM

## 2017-10-25 DIAGNOSIS — M54.16 LUMBAR BACK PAIN WITH RADICULOPATHY AFFECTING RIGHT LOWER EXTREMITY: ICD-10-CM

## 2017-10-25 DIAGNOSIS — G56.03 BILATERAL CARPAL TUNNEL SYNDROME: ICD-10-CM

## 2017-10-25 DIAGNOSIS — M47.22 CERVICAL RADICULOPATHY DUE TO DEGENERATIVE JOINT DISEASE OF SPINE: ICD-10-CM

## 2017-10-25 NOTE — PROCEDURES
[facility] at [address] [facility] [address] [facility], [address] [facility]  Tel [phone]     Fax [phone]  Electrodiagnostic Study Report  Test Date:  10/25/2017    Patient: Rodolfo Boyd : 1980 Physician: Leyda Bonds MD   Sex: Male  < Ref Phys: Kristen Alert     Clinical Indication: Patient is a 71-year-old right-handed -American female with a chief complaint of numbness and tingling and pain in her arms and legs, EMG and nerve conduction study to rule out neuropathy, myopathy, radiculopathy, or other neuromuscular disease. Patient with basically a normal exam.  Patient with normal cranial nerve function, patient with normal cognitive function, patient with normal gait and station for her body size and moderately overweight status. Impression: This study shows electrophysiologic evidence of an essentially normal EMG and nerve conduction velocity study of both legs and the right arm, showing no clear evidence of entrapment neuropathy, motor radiculopathy, neuropathy, or any other neuromuscular disease. Clinical correlation recommended, and follow-up studies in 6-12 months time sometimes may be of further diagnostic benefit if clinically indicated. EMG & NCV Findings:  All nerve conduction studies (as indicated in the following tables) were within normal limits. All F Wave latencies were within normal limits.         Nerve Conduction Studies  Anti Sensory Summary Table     Site NR Peak (ms) Norm Peak (ms) P-T Amp (µV) Norm O-P Amp Site1 Site2 Dist (cm)   Right Median Anti Sensory (2nd Digit)  31.7°C   Wrist    2.9 <4 110.7 >13 Wrist 2nd Digit 14.0   Right Radial Anti Sensory (Base 1st Digit)  31.8°C   Wrist    2.2 <2.8 43.3 >11 Wrist Base 1st Digit 10.0   Right Sup Fibular Anti Sensory (Lat ankle)  30.1°C   Lower leg    2.8 <4.5 17.1 >5 Lower leg Lat ankle 10.0   Left Sural Anti Sensory (Lat Mall)  22.9°C   Calf    3.5 <4.5 14.8 >4.0 Calf Lat Mall 14.0   Right Sural Anti Sensory (Lat Mall)  29.6°C   Site 2    3.1  21.7       Right Ulnar Anti Sensory (5th Digit)  31.8°C   Wrist    3.1 <4.0 48.2 >9 Wrist 5th Digit 14.0     Ortho Sensory Summary Table     Site NR Peak (ms) P-T Amp (µV) Site1 Site2 Dist (cm) Harsha (m/s)   Right Lateral Plantar Ortho Sensory (Med Malleolus)  30.1°C   Digit 5    4.0 8.8 Digit 5 Med Malleolus 130.0 325   Right Medial Plantar Ortho Sensory (Med Malleolus)  29.8°C   Digit 1    3.8 7.4 Digit 1 Med Malleolus 11.0 29     Motor Summary Table     Site NR Onset (ms) Norm Onset (ms) O-P Amp (mV) Norm O-P Amp P-T Amp (mV) Site1 Site2 Dist (cm) Harsha (m/s)   Left Fibular Motor (Ext Dig Brev)  22.9°C   Ankle    4.4 <6.5 10.2 >2.6 16.4 Ankle Ext Dig Brev 8.0    B Fib    10.2  9.6  14.9 B Fib Ankle 30.0 52   Poplt    11.9  9.2  14.2 Poplt B Fib 10.0 59   Right Fibular Motor (Ext Dig Brev)  29.1°C   Ankle    3.9 <6.5 11.8 >2.6 18.1 Ankle Ext Dig Brev 8.0    B Fib    9.8  11.1  16.7 B Fib Ankle 30.5 52   Poplt    11.4  11.0  17.6 Poplt B Fib 10.0 63   Left Median Motor (Abd Poll Brev)  23°C   Wrist    3.4 <4.5 10.0 >4.1 16.0 Wrist Abd Poll Brev 8.0    Elbow    7.2  9.9  16.0 Elbow Wrist 22.0 58   Right Median Motor (Abd Poll Brev)  32.3°C   Wrist    3.2 <4.5 10.8 >4.1 18.9 Wrist Abd Poll Brev 8.0    Elbow    7.3  10.6  18.2 Elbow Wrist 25.0 61   Right Tibial Motor (Abd Granger Brev)  30.3°C   Ankle    4.4 <6.1 9.9 >5.3 19.2 Ankle Abd Granger Brev 8.0    Knee    12.7  8.3  16.2 Knee Ankle 39.5 48   Right Ulnar Motor (Abd Dig Minimi)  31.7°C   Wrist    3.0 <3.1 14.2 >7.0 20.3 Wrist Abd Dig Minimi 8.0    B Elbow    5.9  13.1  19.3 B Elbow Wrist 17.5 60   A Elbow    7.3  13.2  19.1 A Elbow B Elbow 10.0 71     Comparison Summary Table     Site NR Peak (ms) P-T Amp (µV) Site1 Site2 Dist (cm) Delta-0 (ms)   Left Median/Ulnar Palm Comparison (Wrist)  23.2°C   Median Palm    1.7 81.4 Median Palm Ulnar Palm 8.0 0.0   Ulnar Palm    1.8 25.0       Right Median/Ulnar Palm Comparison (Wrist)  25.5°C   Median Palm    2.0 101.7 Median Palm Ulnar Palm 8.0 0.0   Ulnar Palm    2.2 36.6         F Wave Studies     NR F-Lat (ms) Lat Norm (ms) L-R F-Lat (ms) L-R Lat Norm   Right Median (Mrkrs) (Abd Poll Brev)  32.4°C      24.17 <31  <2.2   Right Peroneal (Mrkrs) (EDB)  30.1°C      42.26 <56  <5.1     EMG     Side Muscle Nerve Root Ins Act Fibs Psw Recrt Duration Amp Poly Comment   Right Abd Poll Brev Median C8-T1 Nml Nml Nml Nml Nml Nml Nml    Right Biceps Musculocut C5-6 Nml Nml Nml Nml Nml Nml Nml    Right Triceps Radial C6-7-8 Nml Nml Nml Nml Nml Nml Nml    Right Deltoid Axillary C5-6 Nml Nml Nml Nml Nml Nml Nml    Right Lower Cerv Parasp Rami C7,T1 Nml Nml Nml Nml Nml Nml Nml    Right 1stDorInt Ulnar C8-T1 Nml Nml Nml Nml Nml Nml Nml    Right Ext Dig Brev Dp Br Peron L5, S1 Nml Nml Nml Nml Nml Nml Nml    Right AbdHallucis MedPlantar S1-2 Nml Nml Nml Nml Nml Nml Nml    Right AntTibialis Dp Br Peron L4-5 Nml Nml Nml Nml Nml Nml Nml    Right MedGastroc Tibial S1-2 Nml Nml Nml Nml Nml Nml Nml    Right VastusLat Femoral L2-4 Nml Nml Nml Nml Nml Nml Nml    Right BicepsFemL Sciatic L5-S2 Nml Nml Nml Nml Nml Nml Nml    Right Peroneus Long   Nml Nml Nml Nml Nml Nml Nml    Right Lower Lumb Parasp Rami L5,S1 Nml Nml Nml Nml Nml Nml Nml    Left Ext Dig Brev Dp Br Peron L5, S1 Nml Nml Nml Nml Nml Nml Nml    Left AbdHallucis MedPlantar S1-2 Nml Nml Nml Nml Nml Nml Nml    Left AntTibialis Dp Br Peron L4-5 Nml Nml Nml Nml Nml Nml Nml    Left MedGastroc Tibial S1-2 Nml Nml Nml Nml Nml Nml Nml    Left VastusLat Femoral L2-4 Nml Nml Nml Nml Nml Nml Nml    Left Lower Lumb Parasp Rami L5,S1 Nml Nml Nml Nml Nml Nml Nml        Waveforms:                                                __________________  Hoyle Krabbe, M.D.

## 2017-10-25 NOTE — LETTER
10/25/2017 4:31 PM 
 
Patient:  Linda Dewey YOB: 1980 Date of Visit: 10/25/2017 Dear No Recipients: Thank you for referring Ms. Teodora Edwards to me for evaluation/treatment. Below are the relevant portions of my assessment and plan of care. EMG dictated in procedure note If you have questions, please do not hesitate to call me. I look forward to following Ms. Montoya along with you. Sincerely, Mary Grace Powers MD

## 2018-01-02 ENCOUNTER — CLINICAL SUPPORT (OUTPATIENT)
Dept: FAMILY MEDICINE CLINIC | Age: 38
End: 2018-01-02

## 2018-01-02 DIAGNOSIS — Z11.1 SCREENING-PULMONARY TB: Primary | ICD-10-CM

## 2018-01-05 LAB
MM INDURATION POC: NORMAL MM (ref 0–5)
PPD POC: NEGATIVE NEGATIVE

## 2018-03-08 ENCOUNTER — OFFICE VISIT (OUTPATIENT)
Dept: FAMILY MEDICINE CLINIC | Age: 38
End: 2018-03-08

## 2018-03-08 VITALS
HEIGHT: 64 IN | WEIGHT: 275 LBS | SYSTOLIC BLOOD PRESSURE: 132 MMHG | DIASTOLIC BLOOD PRESSURE: 87 MMHG | TEMPERATURE: 97.8 F | BODY MASS INDEX: 46.95 KG/M2 | OXYGEN SATURATION: 98 % | RESPIRATION RATE: 18 BRPM | HEART RATE: 87 BPM

## 2018-03-08 DIAGNOSIS — J06.9 UPPER RESPIRATORY TRACT INFECTION, UNSPECIFIED TYPE: Primary | ICD-10-CM

## 2018-03-08 DIAGNOSIS — Z20.818 STREP THROAT EXPOSURE: ICD-10-CM

## 2018-03-08 PROBLEM — E66.01 OBESITY, MORBID (HCC): Status: ACTIVE | Noted: 2018-03-08

## 2018-03-08 LAB
S PYO AG THROAT QL: NEGATIVE
VALID INTERNAL CONTROL?: YES

## 2018-03-08 RX ORDER — AZITHROMYCIN 250 MG/1
TABLET, FILM COATED ORAL
Qty: 6 TAB | Refills: 0 | Status: SHIPPED | OUTPATIENT
Start: 2018-03-08 | End: 2018-03-13

## 2018-03-08 NOTE — PROGRESS NOTES
HISTORY OF PRESENT ILLNESS  Leda Ghotra is a 41 y.o. female. Chief Complaint   Patient presents with    Flu     diagnosed with flu at urgent care last week, given tamiflu, prednisone, still aches, fever, SOB, irritating cough       HPI  Was seen at  8-9 d ago  Was pos for Flu  Got Tamiflu,  Steroid shot and pills for Asthma exac  Finished meds 4 d ago    Was better and got worse again    Had Strep contact  Flu and URI contact this last week at home      Review of Systems   Constitutional: Negative for fever. HENT: Positive for sore throat. Negative for congestion. Respiratory: Positive for cough, sputum production (occ yellow mucus), shortness of breath (with activities) and wheezing. Cardiovascular: Positive for chest pain (lying down) and palpitations. Gastrointestinal: Positive for nausea. Negative for diarrhea and vomiting. Musculoskeletal: Positive for myalgias. Past Medical History:   Diagnosis Date    Acquired hypothyroidism     hyperthyroid    Anemia NEC     Arthritis     Asthma     CHF (congestive heart failure) (HCC)     Chronic pain     fibromyalgia    Colitis     GERD (gastroesophageal reflux disease)     Ill-defined condition     ACL repair on L leg     Other unknown and unspecified cause of morbidity or mortality     fibromyalgia    Other unknown and unspecified cause of morbidity or mortality     obesity    Pleural effusion associated with pulmonary infection     Psychiatric problem     anxiety    PUD (peptic ulcer disease)      Current Outpatient Prescriptions   Medication Sig Dispense Refill    azithromycin (ZITHROMAX) 250 mg tablet Take 2 tablets today, then take 1 tablet daily 6 Tab 0    Dexlansoprazole (DEXILANT) 60 mg CpDB Take  by mouth.  ZOLMitriptan (ZOMIG-ZMT) 5 mg disintegrating tablet Take 1 Tab by mouth as needed for Migraine. 12 Tab 11    clotrimazole (LOTRIMIN) 1 % topical cream Apply  to affected area two (2) times a day.  15 g 0    magnesium 250 mg tab Take  by mouth.  ipratropium (ATROVENT) 0.02 % nebulizer solution 2.5 mL by Nebulization route as needed for Wheezing. 2.5 mL 11    ibuprofen-famotidine (DUEXIS) 800-26.6 mg tab Take 1 Tab by mouth three (3) times daily as needed.  MULTIVITAMIN PO Take  by mouth. Takes one tab po occasionally.  fluticasone-salmeterol (ADVAIR DISKUS) 250-50 mcg/dose diskus inhaler Take 1 Puff by inhalation every twelve (12) hours.  albuterol (PROVENTIL HFA, VENTOLIN HFA) 90 mcg/actuation inhaler Take 1-2 puffs by inhalation every four (4) hours as needed for Wheezing. Allergies   Allergen Reactions    Black Pepper Hives, Swelling and Cough    Mushroom Combination No.1 Shortness of Breath, Swelling and Cough    Tomato Hives, Swelling and Cough    Adhesive Tape-Silicones Hives     Causes burning at site     Dicyclomine Rash    Dilaudid [Hydromorphone (Bulk)] Itching    Gluten Other (comments)     GI upset    Morphine Rash    Tramadol Rash     Tolerates percocet     Visit Vitals    /87 (BP 1 Location: Right arm, BP Patient Position: Sitting)    Pulse 87    Temp 97.8 °F (36.6 °C) (Oral)    Resp 18    Ht 5' 4\" (1.626 m)    Wt 275 lb (124.7 kg)    SpO2 98%    BMI 47.2 kg/m2     Physical Exam   Constitutional: She is oriented to person, place, and time. She appears well-developed and well-nourished. No distress. HENT:   Head: Normocephalic and atraumatic. Right Ear: External ear normal.   Left Ear: External ear normal.   Nose: Nose normal.   Mouth/Throat: Oropharyngeal exudate present. Eyes: Conjunctivae and EOM are normal. Pupils are equal, round, and reactive to light. Cardiovascular: Normal rate and regular rhythm. Pulmonary/Chest: Effort normal and breath sounds normal.   Neurological: She is alert and oriented to person, place, and time. Skin: Skin is warm and dry. Psychiatric: She has a normal mood and affect.    Nursing note and vitals reviewed. Recent Results (from the past 12 hour(s))   AMB POC RAPID STREP A    Collection Time: 03/08/18  4:41 PM   Result Value Ref Range    VALID INTERNAL CONTROL POC Yes     Group A Strep Ag Negative Negative       ASSESSMENT and PLAN    ICD-10-CM ICD-9-CM    1. Upper respiratory tract infection, unspecified type J06.9 465.9 azithromycin (ZITHROMAX) 250 mg tablet   2.  Strep throat exposure Z20.818 V01.89 AMB POC RAPID STREP A

## 2018-04-15 ENCOUNTER — HOSPITAL ENCOUNTER (EMERGENCY)
Age: 38
Discharge: HOME | End: 2018-04-15
Attending: EMERGENCY MEDICINE
Payer: COMMERCIAL

## 2018-04-15 ENCOUNTER — APPOINTMENT (OUTPATIENT)
Dept: GENERAL RADIOLOGY | Age: 38
End: 2018-04-15
Attending: EMERGENCY MEDICINE
Payer: COMMERCIAL

## 2018-04-15 ENCOUNTER — APPOINTMENT (OUTPATIENT)
Dept: CT IMAGING | Age: 38
End: 2018-04-15
Attending: EMERGENCY MEDICINE
Payer: COMMERCIAL

## 2018-04-15 VITALS
BODY MASS INDEX: 47.42 KG/M2 | HEIGHT: 64 IN | TEMPERATURE: 98.3 F | DIASTOLIC BLOOD PRESSURE: 111 MMHG | WEIGHT: 277.78 LBS | HEART RATE: 87 BPM | SYSTOLIC BLOOD PRESSURE: 168 MMHG | OXYGEN SATURATION: 100 % | RESPIRATION RATE: 18 BRPM

## 2018-04-15 DIAGNOSIS — G51.0 BELL'S PALSY: Primary | ICD-10-CM

## 2018-04-15 DIAGNOSIS — I10 DIASTOLIC HYPERTENSION: ICD-10-CM

## 2018-04-15 DIAGNOSIS — G44.209 TENSION-TYPE HEADACHE, NOT INTRACTABLE, UNSPECIFIED CHRONICITY PATTERN: ICD-10-CM

## 2018-04-15 LAB
ALBUMIN SERPL-MCNC: 3.6 G/DL (ref 3.5–5)
ALBUMIN/GLOB SERPL: 0.9 {RATIO} (ref 1.1–2.2)
ALP SERPL-CCNC: 100 U/L (ref 45–117)
ALT SERPL-CCNC: 23 U/L (ref 12–78)
ANION GAP SERPL CALC-SCNC: 7 MMOL/L (ref 5–15)
AST SERPL-CCNC: 19 U/L (ref 15–37)
BASOPHILS # BLD: 0 K/UL (ref 0–0.1)
BASOPHILS NFR BLD: 0 % (ref 0–1)
BILIRUB SERPL-MCNC: 1 MG/DL (ref 0.2–1)
BUN SERPL-MCNC: 9 MG/DL (ref 6–20)
BUN/CREAT SERPL: 8 (ref 12–20)
CALCIUM SERPL-MCNC: 8.8 MG/DL (ref 8.5–10.1)
CHLORIDE SERPL-SCNC: 101 MMOL/L (ref 97–108)
CK MB CFR SERPL CALC: NORMAL % (ref 0–2.5)
CK MB SERPL-MCNC: <1 NG/ML (ref 5–25)
CK SERPL-CCNC: 140 U/L (ref 26–192)
CO2 SERPL-SCNC: 31 MMOL/L (ref 21–32)
CREAT SERPL-MCNC: 1.15 MG/DL (ref 0.55–1.02)
DIFFERENTIAL METHOD BLD: ABNORMAL
EOSINOPHIL # BLD: 0.3 K/UL (ref 0–0.4)
EOSINOPHIL NFR BLD: 3 % (ref 0–7)
ERYTHROCYTE [DISTWIDTH] IN BLOOD BY AUTOMATED COUNT: 13.5 % (ref 11.5–14.5)
GLOBULIN SER CALC-MCNC: 4 G/DL (ref 2–4)
GLUCOSE SERPL-MCNC: 161 MG/DL (ref 65–100)
HCT VFR BLD AUTO: 39.9 % (ref 35–47)
HGB BLD-MCNC: 13 G/DL (ref 11.5–16)
IMM GRANULOCYTES # BLD: 0 K/UL (ref 0–0.04)
IMM GRANULOCYTES NFR BLD AUTO: 0 % (ref 0–0.5)
LIPASE SERPL-CCNC: 86 U/L (ref 73–393)
LYMPHOCYTES # BLD: 3.6 K/UL (ref 0.8–3.5)
LYMPHOCYTES NFR BLD: 36 % (ref 12–49)
MCH RBC QN AUTO: 26.5 PG (ref 26–34)
MCHC RBC AUTO-ENTMCNC: 32.6 G/DL (ref 30–36.5)
MCV RBC AUTO: 81.3 FL (ref 80–99)
MONOCYTES # BLD: 0.6 K/UL (ref 0–1)
MONOCYTES NFR BLD: 6 % (ref 5–13)
NEUTS SEG # BLD: 5.5 K/UL (ref 1.8–8)
NEUTS SEG NFR BLD: 55 % (ref 32–75)
NRBC # BLD: 0 K/UL (ref 0–0.01)
NRBC BLD-RTO: 0 PER 100 WBC
PLATELET # BLD AUTO: 285 K/UL (ref 150–400)
PMV BLD AUTO: 11.7 FL (ref 8.9–12.9)
POTASSIUM SERPL-SCNC: 3.2 MMOL/L (ref 3.5–5.1)
PROT SERPL-MCNC: 7.6 G/DL (ref 6.4–8.2)
RBC # BLD AUTO: 4.91 M/UL (ref 3.8–5.2)
SODIUM SERPL-SCNC: 139 MMOL/L (ref 136–145)
TROPONIN I SERPL-MCNC: <0.04 NG/ML
WBC # BLD AUTO: 10.1 K/UL (ref 3.6–11)

## 2018-04-15 PROCEDURE — 83690 ASSAY OF LIPASE: CPT | Performed by: EMERGENCY MEDICINE

## 2018-04-15 PROCEDURE — 82550 ASSAY OF CK (CPK): CPT | Performed by: EMERGENCY MEDICINE

## 2018-04-15 PROCEDURE — 93005 ELECTROCARDIOGRAM TRACING: CPT

## 2018-04-15 PROCEDURE — 99284 EMERGENCY DEPT VISIT MOD MDM: CPT

## 2018-04-15 PROCEDURE — 36415 COLL VENOUS BLD VENIPUNCTURE: CPT | Performed by: EMERGENCY MEDICINE

## 2018-04-15 PROCEDURE — 74011250637 HC RX REV CODE- 250/637: Performed by: EMERGENCY MEDICINE

## 2018-04-15 PROCEDURE — 85025 COMPLETE CBC W/AUTO DIFF WBC: CPT | Performed by: EMERGENCY MEDICINE

## 2018-04-15 PROCEDURE — 70450 CT HEAD/BRAIN W/O DYE: CPT

## 2018-04-15 PROCEDURE — 84484 ASSAY OF TROPONIN QUANT: CPT | Performed by: EMERGENCY MEDICINE

## 2018-04-15 PROCEDURE — 71046 X-RAY EXAM CHEST 2 VIEWS: CPT

## 2018-04-15 PROCEDURE — 80053 COMPREHEN METABOLIC PANEL: CPT | Performed by: EMERGENCY MEDICINE

## 2018-04-15 PROCEDURE — 74011636637 HC RX REV CODE- 636/637: Performed by: EMERGENCY MEDICINE

## 2018-04-15 RX ORDER — CLONIDINE HYDROCHLORIDE 0.1 MG/1
0.1 TABLET ORAL
Status: COMPLETED | OUTPATIENT
Start: 2018-04-15 | End: 2018-04-15

## 2018-04-15 RX ORDER — ACYCLOVIR 800 MG/1
800 TABLET ORAL
Qty: 35 TAB | Refills: 0 | Status: SHIPPED | OUTPATIENT
Start: 2018-04-15 | End: 2018-04-15

## 2018-04-15 RX ORDER — PREDNISONE 10 MG/1
40 TABLET ORAL
Status: COMPLETED | OUTPATIENT
Start: 2018-04-15 | End: 2018-04-15

## 2018-04-15 RX ORDER — POTASSIUM CHLORIDE 750 MG/1
40 TABLET, FILM COATED, EXTENDED RELEASE ORAL
Status: COMPLETED | OUTPATIENT
Start: 2018-04-15 | End: 2018-04-15

## 2018-04-15 RX ORDER — METOPROLOL TARTRATE 25 MG/1
25 TABLET, FILM COATED ORAL 2 TIMES DAILY
Qty: 30 TAB | Refills: 2 | Status: SHIPPED | OUTPATIENT
Start: 2018-04-15 | End: 2018-10-17 | Stop reason: SDUPTHER

## 2018-04-15 RX ORDER — PREDNISONE 50 MG/1
50 TABLET ORAL DAILY
Qty: 7 TAB | Refills: 0 | Status: SHIPPED | OUTPATIENT
Start: 2018-04-15 | End: 2018-04-15

## 2018-04-15 RX ORDER — ACYCLOVIR 800 MG/1
800 TABLET ORAL
Qty: 35 TAB | Refills: 0 | Status: SHIPPED | OUTPATIENT
Start: 2018-04-15 | End: 2018-04-22

## 2018-04-15 RX ORDER — PREDNISONE 50 MG/1
50 TABLET ORAL DAILY
Qty: 7 TAB | Refills: 0 | Status: SHIPPED | OUTPATIENT
Start: 2018-04-15 | End: 2018-04-22

## 2018-04-15 RX ORDER — METOPROLOL TARTRATE 25 MG/1
25 TABLET, FILM COATED ORAL 2 TIMES DAILY
Qty: 30 TAB | Refills: 2 | Status: SHIPPED | OUTPATIENT
Start: 2018-04-15 | End: 2018-04-15

## 2018-04-15 RX ORDER — ACYCLOVIR 800 MG/1
800 TABLET ORAL
Status: COMPLETED | OUTPATIENT
Start: 2018-04-15 | End: 2018-04-15

## 2018-04-15 RX ADMIN — POTASSIUM CHLORIDE 40 MEQ: 750 TABLET, FILM COATED, EXTENDED RELEASE ORAL at 19:54

## 2018-04-15 RX ADMIN — PREDNISONE 40 MG: 10 TABLET ORAL at 18:55

## 2018-04-15 RX ADMIN — CLONIDINE HYDROCHLORIDE 0.1 MG: 0.1 TABLET ORAL at 18:55

## 2018-04-15 RX ADMIN — ACYCLOVIR 800 MG: 800 TABLET ORAL at 18:55

## 2018-04-15 NOTE — DISCHARGE INSTRUCTIONS
Bell's Palsy: Care Instructions  Your Care Instructions    Bell's palsy is paralysis or weakness of the muscles on one side of the face. Often people with Bell's palsy have a droop on one side of the mouth and have trouble completely shutting the eye on the same side. Bell's palsy can also interfere with the sense of taste. These things happen when a nerve in your face becomes inflamed. Bell's palsy is not caused by a stroke. The cause of the nerve inflammation is not known. But some experts think that a virus may cause it. Because of this, doctors sometimes prescribe antiviral medicine to treat it. You also may get medicine to reduce swelling. Bell's palsy usually gets better on its own in a few weeks or months. Follow-up care is a key part of your treatment and safety. Be sure to make and go to all appointments, and call your doctor if you are having problems. It's also a good idea to know your test results and keep a list of the medicines you take. How can you care for yourself at home? · Take your medicines exactly as prescribed. Call your doctor if you think you are having a problem with your medicine. You will get more details on the specific medicines your doctor prescribes. · Use artificial tears or ointment if your eyes are too dry. Bell's palsy can make your lower eyelid droop, causing a dry eye. · If you cannot completely close your eye, consider using an eye patch while you sleep. · Help yourself blink by using your finger to close and open your eyelid. This may help keep your eye moist.  · Wear glasses or goggles to keep dust and dirt out of your eye. · As feeling comes back to your face, massage your forehead, cheeks, and lips. Massage may make the muscles in your face stronger. · Brush and floss your teeth often to help prevent tooth decay. Bell's palsy can dry up the spit on one side of your mouth. This increases the risk of tooth decay. When should you call for help?   Call 911 anytime you think you may need emergency care. For example, call if:  ? · You have symptoms of a stroke. These may include:  ¨ Sudden numbness, tingling, weakness, or loss of movement in your face, arm, or leg, especially on only one side of your body. ¨ Sudden vision changes. ¨ Sudden trouble speaking. ¨ Sudden confusion or trouble understanding simple statements. ¨ Sudden problems with walking or balance. ¨ A sudden, severe headache that is different from past headaches. ?Call your doctor now or seek immediate medical care if:  ? · You have numbness or weakness that spreads beyond one side of your face. ? · You have a skin rash or eye pain or redness, or light bothers your eyes. ? · You have a new or worse headache. ? Watch closely for changes in your health, and be sure to contact your doctor if:  ? · You do not get better as expected. Where can you learn more? Go to http://moe-antonio.info/. Enter P168 in the search box to learn more about \"Bell's Palsy: Care Instructions. \"  Current as of: October 14, 2016  Content Version: 11.4  © 9338-6391 [employer]. Care instructions adapted under license by [employer] (which disclaims liability or warranty for this information). If you have questions about a medical condition or this instruction, always ask your healthcare professional. [address] any warranty or liability for your use of this information. Tension Headache: Care Instructions  Your Care Instructions  Most headaches are tension headaches. These headaches tend to happen again, especially if you are under stress. A tension headache may cause pain or a feeling of pressure all over your head. You probably can't pinpoint the center of the pain. If you keep getting tension headaches, the best thing you can do to limit them is to find out what is causing them and then make changes in those areas.   Follow-up care is a key part of your treatment and safety. Be sure to make and go to all appointments, and call your doctor if you are having problems. It's also a good idea to know your test results and keep a list of the medicines you take. How can you care for yourself at home? · Rest in a quiet, dark room with a cool cloth on your forehead until your headache is gone. Close your eyes, and try to relax or go to sleep. Don't watch TV or read. Avoid using the computer. · Use a warm, moist towel or a heating pad set on low to relax tight shoulder and neck muscles. · Have someone gently massage your neck and shoulders. · Take pain medicines exactly as directed. ¨ If the doctor gave you a prescription medicine for pain, take it as prescribed. ¨ If you are not taking a prescription pain medicine, ask your doctor if you can take an over-the-counter medicine. · Be careful not to take pain medicine more often than the instructions allow, because you may get worse or more frequent headaches when the medicine wears off. · If you get another tension headache, stop what you are doing and sit quietly for a moment. Close your eyes and breathe slowly. Try to relax your head and neck muscles. · Do not ignore new symptoms that occur with a headache, such as fever, weakness or numbness, vision changes, or confusion. These may be signs of a more serious problem. To help prevent headaches  · Keep a headache diary so you can figure out what triggers your headaches. Avoiding triggers may help you prevent headaches. Record when each headache began, how long it lasted, and what the pain was like (throbbing, aching, stabbing, or dull). List anything that may have triggered the headache, such as being physically or emotionally stressed or being anxious or depressed. Other possible triggers are hunger, anger, fatigue, poor posture, and muscle strain. · Find healthy ways to deal with stress. Headaches are most common during or right after stressful times.  Take time to relax before and after you do something that has caused a headache in the past.  · Exercise daily to relieve stress. Relaxation exercises may help reduce tension. · Get plenty of sleep. · Eat regularly and well. Long periods without food can trigger a headache. · Treat yourself to a massage. Some people find that massages are very helpful in relieving tension. · Try to keep your muscles relaxed by keeping good posture. Check your jaw, face, neck, and shoulder muscles for tension, and try to relax them. When sitting at a desk, change positions often, and stretch for 30 seconds each hour. · Reduce eyestrain from computers by blinking frequently and looking away from the computer screen every so often. Make sure you have proper eyewear and that your monitor is set up properly, about an arm's length away. When should you call for help? Call 911 anytime you think you may need emergency care. For example, call if:  ? · You have signs of a stroke. These may include:  ¨ Sudden numbness, paralysis, or weakness in your face, arm, or leg, especially on only one side of your body. ¨ Sudden vision changes. ¨ Sudden trouble speaking. ¨ Sudden confusion or trouble understanding simple statements. ¨ Sudden problems with walking or balance. ¨ A sudden, severe headache that is different from past headaches. ?Call your doctor now or seek immediate medical care if:  ? · You have new or worse nausea and vomiting. ? · You have a new or higher fever. ? · Your headache gets much worse. ? Watch closely for changes in your health, and be sure to contact your doctor if:  ? · You are not getting better after 2 days (48 hours). Where can you learn more? Go to http://moe-antonio.info/. Enter 84 [id number] in the search box to learn more about \"Tension Headache: Care Instructions. \"  Current as of: October 14, 2016  Content Version: 11.4  © 0010-2408 [employer].  Care instructions adapted under license by [employer] (which disclaims liability or warranty for this information). If you have questions about a medical condition or this instruction, always ask your healthcare professional. [address] any warranty or liability for your use of this information.

## 2018-04-15 NOTE — ED PROVIDER NOTES
EMERGENCY DEPARTMENT HISTORY AND PHYSICAL EXAM      I have seen and evaluated this patient in the Express Care portion of triage for headache, nausea, chest pain, SOB. The patient's care will begin now and orders have been placed. This patient will be seen and provided further care in the Emergency Room. Written by Lionel De La Vega, a scribe for Monster Farrar DO. Date: 4/15/2018  Patient Name: Collette Merlos    History of Presenting Illness     Chief Complaint   Patient presents with    Chest Pain     Ambulatory into the ED with c/o HTN, anterior headache, nausea, SOB, CP radiating down her Rt arm x 1 week. Pmh migraines.  Headache    Hypertension    Shortness of Breath    Nausea     History Provided By: Patient    HPI: Collette Merlos, 41 y.o. female with PMHx significant for HTN and CHF, presents ambulatory to the ED with cc of a persistent and diffuse headache x 3 days. She also c/o associated numbness to her face, generalized chest pain, and nausea that started at 1200 this afternoon. She reports that onset of numbness and chest pain occurred after waking up. She reports that her HR and blood pressure were elevated today. She also c/o intermittent episodes of palpitations x 1 week. She describes her palpitations as a \"flutter. \" She states that she is prescribed HCTZ. She notes that she was diagnosed with influenza 1 month ago. She denies any other recent illnesses. She reports having a small bowel movement this morning. She denies a history of oral herpes. She specifically denies SOB, diarrhea, nausea, vomiting, abdominal pain, slurred speech, or other complaints at this time. PCP: Ashley Do PA-C    There are no other complaints, changes, or physical findings at this time. Current Outpatient Prescriptions   Medication Sig Dispense Refill    acyclovir (ZOVIRAX) 800 mg tablet Take 1 Tab by mouth five (5) times daily for 7 days.  35 Tab 0    predniSONE (DELTASONE) 50 mg tablet Take 1 Tab by mouth daily for 7 days. With Breakfast 7 Tab 0    metoprolol tartrate (LOPRESSOR) 25 mg tablet Take 1 Tab by mouth two (2) times a day. 30 Tab 2    Dexlansoprazole (DEXILANT) 60 mg CpDB Take  by mouth.  ZOLMitriptan (ZOMIG-ZMT) 5 mg disintegrating tablet Take 1 Tab by mouth as needed for Migraine. 12 Tab 11    clotrimazole (LOTRIMIN) 1 % topical cream Apply  to affected area two (2) times a day. 15 g 0    magnesium 250 mg tab Take  by mouth.  ipratropium (ATROVENT) 0.02 % nebulizer solution 2.5 mL by Nebulization route as needed for Wheezing. 2.5 mL 11    ibuprofen-famotidine (DUEXIS) 800-26.6 mg tab Take 1 Tab by mouth three (3) times daily as needed.  MULTIVITAMIN PO Take  by mouth. Takes one tab po occasionally.  fluticasone-salmeterol (ADVAIR DISKUS) 250-50 mcg/dose diskus inhaler Take 1 Puff by inhalation every twelve (12) hours.  albuterol (PROVENTIL HFA, VENTOLIN HFA) 90 mcg/actuation inhaler Take 1-2 puffs by inhalation every four (4) hours as needed for Wheezing.          Past History     Past Medical History:  Past Medical History:   Diagnosis Date    Acquired hypothyroidism     hyperthyroid    Anemia NEC     Arthritis     Asthma     CHF (congestive heart failure) (HCC)     Chronic pain     fibromyalgia    Colitis     GERD (gastroesophageal reflux disease)     Ill-defined condition     ACL repair on L leg     Other unknown and unspecified cause of morbidity or mortality     fibromyalgia    Other unknown and unspecified cause of morbidity or mortality     obesity    Pleural effusion associated with pulmonary infection     Psychiatric problem     anxiety    PUD (peptic ulcer disease)        Past Surgical History:  Past Surgical History:   Procedure Laterality Date     DELIVERY ONLY      x2, one with classical uterine incision    HX  SECTION      x3 total    HX CHOLECYSTECTOMY      HX HEENT      sinus surgery    HX HERNIA REPAIR      HX ORTHOPAEDIC      ACL repair x2 - twice       Family History:  Family History   Problem Relation Age of Onset    Diabetes Father     Heart Attack Father      62 smoker    Hypertension Father     Asthma Father     High Cholesterol Father     Stroke Father     Rashes/Skin Problems Father      eczema    Heart Disease Father     Hypertension Mother     Asthma Mother     Hypertension Maternal Grandmother     Stroke Maternal Grandmother     Heart Disease Maternal Grandfather     Diabetes Paternal Grandmother     Dementia Paternal Grandfather     Diabetes Brother     Diabetes Sister     Other Sister      ankylosing spondylitis    Hypertension Sister     Other Sister      ankylosing spondylitis    Hypertension Brother     Asthma Daughter     Asthma Son     Asthma Son     Asthma Son     Asthma Son     Asthma Son        Social History:  Social History   Substance Use Topics    Smoking status: Never Smoker    Smokeless tobacco: Never Used    Alcohol use No       Allergies: Allergies   Allergen Reactions    Black Pepper Hives, Swelling and Cough    Mushroom Combination No.1 Shortness of Breath, Swelling and Cough    Tomato Hives, Swelling and Cough    Adhesive Tape-Silicones Hives     Causes burning at site     Dicyclomine Rash    Dilaudid [Hydromorphone (Bulk)] Itching    Doxycycline Hives    Gluten Other (comments)     GI upset    Morphine Rash    Tramadol Rash     Tolerates percocet         Review of Systems   Review of Systems   Constitutional: Negative for chills and fever. HENT: Negative. Eyes: Negative. Respiratory: Negative for cough and shortness of breath. Cardiovascular: Positive for chest pain and palpitations. Gastrointestinal: Positive for nausea. Negative for abdominal pain, constipation, diarrhea and vomiting. Genitourinary: Negative. Musculoskeletal: Negative. Skin: Negative. Neurological: Positive for numbness and headaches.  Negative for speech difficulty and weakness. All other systems reviewed and are negative. Physical Exam   Physical Exam   Constitutional: She is oriented to person, place, and time. She appears well-developed and well-nourished. HENT:   Head: Normocephalic. Eyes: EOM are normal. Pupils are equal, round, and reactive to light. Neck: Normal range of motion. Cardiovascular: Normal rate and regular rhythm. Pulmonary/Chest: Effort normal and breath sounds normal.   Abdominal: Soft. She exhibits no distension. There is no tenderness. Neurological: She is alert and oriented to person, place, and time. 5/5 strength in all 4 extremities, Finger to nose intact, normal gait, left-sided facial droop with left eyebrow involvement. Skin: Skin is warm and dry. Psychiatric: She has a normal mood and affect. Nursing note and vitals reviewed.       Diagnostic Study Results     Labs -     Recent Results (from the past 12 hour(s))   EKG, 12 LEAD, INITIAL    Collection Time: 04/15/18  5:10 PM   Result Value Ref Range    Ventricular Rate 92 BPM    Atrial Rate 92 BPM    P-R Interval 152 ms    QRS Duration 108 ms    Q-T Interval 340 ms    QTC Calculation (Bezet) 420 ms    Calculated P Axis 57 degrees    Calculated R Axis 42 degrees    Calculated T Axis 23 degrees    Diagnosis       Normal sinus rhythm  Nonspecific T wave abnormality  Abnormal ECG  When compared with ECG of 27-JUL-2017 11:46,  No significant change was found     METABOLIC PANEL, COMPREHENSIVE    Collection Time: 04/15/18  5:38 PM   Result Value Ref Range    Sodium 139 136 - 145 mmol/L    Potassium 3.2 (L) 3.5 - 5.1 mmol/L    Chloride 101 97 - 108 mmol/L    CO2 31 21 - 32 mmol/L    Anion gap 7 5 - 15 mmol/L    Glucose 161 (H) 65 - 100 mg/dL    BUN 9 6 - 20 MG/DL    Creatinine 1.15 (H) 0.55 - 1.02 MG/DL    BUN/Creatinine ratio 8 (L) 12 - 20      GFR est AA >60 >60 ml/min/1.73m2    GFR est non-AA 53 (L) >60 ml/min/1.73m2    Calcium 8.8 8.5 - 10.1 MG/DL    Bilirubin, total 1.0 0.2 - 1.0 MG/DL    ALT (SGPT) 23 12 - 78 U/L    AST (SGOT) 19 15 - 37 U/L    Alk. phosphatase 100 45 - 117 U/L    Protein, total 7.6 6.4 - 8.2 g/dL    Albumin 3.6 3.5 - 5.0 g/dL    Globulin 4.0 2.0 - 4.0 g/dL    A-G Ratio 0.9 (L) 1.1 - 2.2     CK W/ CKMB & INDEX    Collection Time: 04/15/18  5:38 PM   Result Value Ref Range     26 - 192 U/L    CK - MB <1.0 <3.6 NG/ML    CK-MB Index Cannot be calculated 0 - 2.5     CBC WITH AUTOMATED DIFF    Collection Time: 04/15/18  5:38 PM   Result Value Ref Range    WBC 10.1 3.6 - 11.0 K/uL    RBC 4.91 3.80 - 5.20 M/uL    HGB 13.0 11.5 - 16.0 g/dL    HCT 39.9 35.0 - 47.0 %    MCV 81.3 80.0 - 99.0 FL    MCH 26.5 26.0 - 34.0 PG    MCHC 32.6 30.0 - 36.5 g/dL    RDW 13.5 11.5 - 14.5 %    PLATELET 254 699 - 920 K/uL    MPV 11.7 8.9 - 12.9 FL    NRBC 0.0 0  WBC    ABSOLUTE NRBC 0.00 0.00 - 0.01 K/uL    NEUTROPHILS 55 32 - 75 %    LYMPHOCYTES 36 12 - 49 %    MONOCYTES 6 5 - 13 %    EOSINOPHILS 3 0 - 7 %    BASOPHILS 0 0 - 1 %    IMMATURE GRANULOCYTES 0 0.0 - 0.5 %    ABS. NEUTROPHILS 5.5 1.8 - 8.0 K/UL    ABS. LYMPHOCYTES 3.6 (H) 0.8 - 3.5 K/UL    ABS. MONOCYTES 0.6 0.0 - 1.0 K/UL    ABS. EOSINOPHILS 0.3 0.0 - 0.4 K/UL    ABS. BASOPHILS 0.0 0.0 - 0.1 K/UL    ABS. IMM. GRANS. 0.0 0.00 - 0.04 K/UL    DF AUTOMATED     TROPONIN I    Collection Time: 04/15/18  5:38 PM   Result Value Ref Range    Troponin-I, Qt. <0.04 <0.05 ng/mL   LIPASE    Collection Time: 04/15/18  5:38 PM   Result Value Ref Range    Lipase 86 73 - 393 U/L       Radiologic Studies -     CT Results  (Last 48 hours)               04/15/18 1917  CT HEAD WO CONT Final result    Impression:  IMPRESSION: No Intracranial Disease Evident on Head CT. Narrative:  INDICATION: Stroke; facial droop       EXAM: CT HEAD without contrast.    CT dose reduction was achieved through use of a standardized protocol tailored   for this examination and automatic exposure control for dose modulation. FINDINGS: Unenhanced CT Head is performed. The brain parenchyma is unremarkable   in appearance for age, without evidence for infarct. There is no bleed, mass,   shift, hydrocephalus or extra-axial fluid collection. Bone windows are   unremarkable. CXR Results  (Last 48 hours)               04/15/18 1833  XR CHEST PA LAT Final result    Impression:  IMPRESSION: Normal two view chest x-ray. Narrative:  INDICATION: Chest pain       EXAM: CXR 2 View       FINDINGS: Frontal and lateral views of the chest show clear lungs. Heart size is   normal. There is no pulmonary edema. There is no evident pneumothorax,   adenopathy or effusion. Medical Decision Making   I am the first provider for this patient. I reviewed the vital signs, available nursing notes, past medical history, past surgical history, family history and social history. Vital Signs-Reviewed the patient's vital signs. Patient Vitals for the past 12 hrs:   Temp Pulse Resp BP SpO2   04/15/18 1940 - 87 - (!) 168/111 -   04/15/18 1930 - 86 - (!) 162/108 -   04/15/18 1920 - - - (!) 161/115 -   04/15/18 1900 - 91 - (!) 171/116 -   04/15/18 1850 - 94 - (!) 153/113 -   04/15/18 1708 98.3 °F (36.8 °C) (!) 101 18 (!) 154/105 100 %       Pulse Oximetry Analysis - 100% on RA    Cardiac Monitor:   Rate: 96 bpm  Rhythm: Normal Sinus Rhythm     Records Reviewed: Nursing Notes, Old Medical Records, Previous Radiology Studies and Previous Laboratory Studies    Provider Notes (Medical Decision Making):   Patient presenting with several complaints, including a headache. DDx: hypertensive headache (given her diastolic blood pressure is elevated) migraine, cluster HA, tension HA, dehydration. Less likely pseudotumor cerebri, SAH, ICH, cerebral dural venous thrombosis, or meningitis given the course, story and physical exam.  Analgesics and fluids ordered.    With her left sided facial droop on exam, it is more likely a Bell's Palsy than a true stroke. But given history, will obtain a CT head. Counseled on the importance of good hydration and 3 meals a day as well as good sleep hygiene. ED Course:   Initial assessment performed. The patients presenting problems have been discussed, and they are in agreement with the care plan formulated and outlined with them. I have encouraged them to ask questions as they arise throughout their visit. Progress Note:  6:46 PM  The patient informed that she is hypokalemic, will replete K. Pt's troponin is negative. Written by Kristy Montalvo, ED Scribe, as dictated by Ludin Dawkins MD.    Progress Note:  7:50 PM  At time of discharge, patient was informed that her head CT is negative. Pt's blood pressure is normotensive at time of discharge. Pt was given customary return precautions. Written by Kristy Montalvo, ED Scribe, as dictated by Ludin Dawkins MD.    Critical Care Time: 0 minutes    Discharge Note:  7:57 PM  The pt is ready for discharge. The pt's signs, symptoms, diagnosis, and discharge instructions have been discussed and pt has conveyed their understanding. The pt is to follow up as recommended or return to ER should their symptoms worsen. Plan has been discussed and pt is in agreement. PLAN:  1. Discharge Medication List as of 4/15/2018  7:56 PM      START taking these medications    Details   acyclovir (ZOVIRAX) 800 mg tablet Take 1 Tab by mouth five (5) times daily for 7 days. , Normal, Disp-35 Tab, R-0      predniSONE (DELTASONE) 50 mg tablet Take 1 Tab by mouth daily for 7 days.  With Breakfast, Normal, Disp-7 Tab, R-0      metoprolol tartrate (LOPRESSOR) 25 mg tablet Take 1 Tab by mouth two (2) times a day., Normal, Disp-30 Tab, R-2         CONTINUE these medications which have NOT CHANGED    Details   Dexlansoprazole (DEXILANT) 60 mg CpDB Take  by mouth., Historical Med      ZOLMitriptan (ZOMIG-ZMT) 5 mg disintegrating tablet Take 1 Tab by mouth as needed for Migraine., Normal, Disp-12 Tab, R-11      clotrimazole (LOTRIMIN) 1 % topical cream Apply  to affected area two (2) times a day., Normal, Disp-15 g, R-0      magnesium 250 mg tab Take  by mouth., Historical Med      ipratropium (ATROVENT) 0.02 % nebulizer solution 2.5 mL by Nebulization route as needed for Wheezing., No Print, Disp-2.5 mL, R-11      ibuprofen-famotidine (DUEXIS) 800-26.6 mg tab Take 1 Tab by mouth three (3) times daily as needed., Historical Med      MULTIVITAMIN PO Take  by mouth. Takes one tab po occasionally. , Historical Med      fluticasone-salmeterol (ADVAIR DISKUS) 250-50 mcg/dose diskus inhaler Take 1 Puff by inhalation every twelve (12) hours. , Historical Med      albuterol (PROVENTIL HFA, VENTOLIN HFA) 90 mcg/actuation inhaler Take 1-2 puffs by inhalation every four (4) hours as needed for Wheezing., Historical Med           2. Follow-up Information     Follow up With Details Comments Arron Max PA-C Schedule an appointment as soon as possible for a visit For this week [facility] [address] [place] [address],[facility] Floor  [phone]      [facility] EMERGENCY DEPT  If symptoms worsen [address] [place] [address]  [phone]        Return to ED if worse     Diagnosis     Clinical Impression:   1. Bell's palsy    2. Diastolic hypertension    3. Tension-type headache, not intractable, unspecified chronicity pattern        Attestations: This note is prepared by Katy Nicholson, acting as a Scribe for Amalia Espinal MD.    Amalia Espinal MD: The scribe's documentation has been prepared under my direction and personally reviewed by me in its entirety. I confirm that the notes above accurately reflects all work, treatment, procedures, and medical decision making performed by me. This note will not be viewable in [address]

## 2018-04-16 LAB
ATRIAL RATE: 92 BPM
CALCULATED P AXIS, ECG09: 57 DEGREES
CALCULATED R AXIS, ECG10: 42 DEGREES
CALCULATED T AXIS, ECG11: 23 DEGREES
DIAGNOSIS, 93000: NORMAL
P-R INTERVAL, ECG05: 152 MS
Q-T INTERVAL, ECG07: 340 MS
QRS DURATION, ECG06: 108 MS
QTC CALCULATION (BEZET), ECG08: 420 MS
VENTRICULAR RATE, ECG03: 92 BPM

## 2018-04-16 NOTE — ED NOTES
Kerrie Glynn MD   has done a bedside review of the discharge instructions. The patient is in understanding. The patients line(s) are removed. The patient is dressed, and belongings together for discharge.

## 2018-04-17 ENCOUNTER — TELEPHONE (OUTPATIENT)
Dept: FAMILY MEDICINE CLINIC | Age: 38
End: 2018-04-17

## 2018-04-17 ENCOUNTER — OFFICE VISIT (OUTPATIENT)
Dept: FAMILY MEDICINE CLINIC | Age: 38
End: 2018-04-17

## 2018-04-17 VITALS
DIASTOLIC BLOOD PRESSURE: 70 MMHG | WEIGHT: 284.4 LBS | BODY MASS INDEX: 48.55 KG/M2 | RESPIRATION RATE: 14 BRPM | TEMPERATURE: 98.1 F | SYSTOLIC BLOOD PRESSURE: 117 MMHG | HEIGHT: 64 IN | OXYGEN SATURATION: 98 % | HEART RATE: 69 BPM

## 2018-04-17 DIAGNOSIS — R63.5 WEIGHT GAIN: ICD-10-CM

## 2018-04-17 DIAGNOSIS — R06.83 SNORES: ICD-10-CM

## 2018-04-17 DIAGNOSIS — Z91.89 RISK FACTORS FOR OBSTRUCTIVE SLEEP APNEA: ICD-10-CM

## 2018-04-17 DIAGNOSIS — Z09 HOSPITAL DISCHARGE FOLLOW-UP: Primary | ICD-10-CM

## 2018-04-17 DIAGNOSIS — R07.89 ATYPICAL CHEST PAIN: ICD-10-CM

## 2018-04-17 DIAGNOSIS — R53.83 FATIGUE, UNSPECIFIED TYPE: ICD-10-CM

## 2018-04-17 DIAGNOSIS — G51.0 BELL'S PALSY: ICD-10-CM

## 2018-04-17 DIAGNOSIS — R06.09 DOE (DYSPNEA ON EXERTION): ICD-10-CM

## 2018-04-17 DIAGNOSIS — I10 ESSENTIAL HYPERTENSION: ICD-10-CM

## 2018-04-17 PROBLEM — E11.21 TYPE 2 DIABETES WITH NEPHROPATHY (HCC): Status: ACTIVE | Noted: 2018-04-17

## 2018-04-17 RX ORDER — FLUTICASONE PROPIONATE 50 MCG
SPRAY, SUSPENSION (ML) NASAL
Refills: 5 | COMMUNITY
Start: 2018-03-23 | End: 2019-02-13

## 2018-04-17 RX ORDER — MEDROXYPROGESTERONE ACETATE 10 MG/1
TABLET ORAL
Refills: 12 | COMMUNITY
Start: 2018-04-09 | End: 2019-07-31

## 2018-04-17 RX ORDER — MONTELUKAST SODIUM 10 MG/1
TABLET ORAL
Refills: 5 | COMMUNITY
Start: 2018-03-23 | End: 2020-10-30

## 2018-04-17 RX ORDER — BENZONATATE 200 MG/1
CAPSULE ORAL
Refills: 0 | COMMUNITY
Start: 2018-03-13 | End: 2019-02-21

## 2018-04-17 RX ORDER — FLUTICASONE PROPIONATE AND SALMETEROL 50; 500 UG/1; UG/1
POWDER RESPIRATORY (INHALATION)
Refills: 2 | COMMUNITY
Start: 2018-03-23 | End: 2020-03-25 | Stop reason: ALTCHOICE

## 2018-04-17 RX ORDER — CETIRIZINE HCL 10 MG
TABLET ORAL
Refills: 5 | COMMUNITY
Start: 2018-03-23 | End: 2020-07-14 | Stop reason: ALTCHOICE

## 2018-04-17 NOTE — PROGRESS NOTES
Chief Complaint   Patient presents with   [facility]; pt still feeling bad; left side facial weakness (noted)     Visit Vitals    /70 (BP 1 Location: Left arm, BP Patient Position: Sitting)    Pulse 69    Temp 98.1 °F (36.7 °C) (Oral)    Resp 14    Ht 5' 4\" (1.626 m)    Wt 284 lb 6.4 oz (129 kg)    LMP 04/01/2018    SpO2 98%    BMI 48.82 kg/m2     Padmini Stafford LPN

## 2018-04-17 NOTE — PROGRESS NOTES
Collette Merlos is a 41 y.o. female who presents to the office today with the following:  Chief Complaint   Patient presents with   [place] [id number] ED on Sunday f/u; pt still feeling bad; left side facial weakness (noted)       HPI  Pt presented to [facility] ED [place] 4/15 with c/o left sided numbness/tingling of face, chest pain, and HA. Also her BP was high in 140s/103. Had unremarkable work-up including a normal head CT and chest XR, labs w/cardiac enzymes, and EKG. Dx and Tx for Bell's Palsy and HTN. Given Clonidine 0.1mg in ER- D/c on Metoprolol 25 mg BID, Acyclovir 800 mg, Prednisone 40 mg, and Klor-con 40 mEq. Today pt still c/o HA. More \"Vice like\" than typical migraine- dx with Tension HA in ER. OTC meds not helping. Is under a lot of stress. I did sent pt to Neuro previously for her migraines. Pt says she never received her results. Cannot get call back. Could not take Topamax due to making her skin feel like crawling. Now on Zolmitriptan abortive tx which does help, but says current HA is different. BP has remained somewhat elevated at home. This morning 131/104. Good in office today. Has only been on Metoprolol for a few days. Pt states BP had been running fine previously, even d/c her HCTZ ~4mo ago and was still running in 120s/80s up until recent ER visit. Pt with h/o of atypical chest pain complaints and sob in past.   Has had this on/off for over a year now. Numerous EKGs in the past. No recent changes. We did at one point try to set her up for ETT- pt does not recall why she did not get this done. Still gets RIVER and chest discomfort, feels like rubber band twisting/tightening, occurs with rest and exertion. Says sometimes even the slightest movement can cause the chest discomfort, usually lasts a few minutes.   Also still gets occasional palpitations \"dancing in my chest\" usually lasts for a few sec, gets a little lightheaded/dizzy with it.  Would like to hold off on cardiology referral, but agrees to try again for stress test.    Admits to weight gain. Has had to be on prednisone a lot for various reasons past few months. Feels worn down, tired.  says snores louder than him at night, wakes up gasping/coughing. Difficulty falling asleep. Review of Systems   Constitutional: Positive for malaise/fatigue. Negative for chills and fever. HENT: Negative for congestion and sinus pain. Respiratory: Positive for shortness of breath. Negative for cough, sputum production and wheezing. Cardiovascular: Positive for chest pain and palpitations. Negative for orthopnea, claudication, leg swelling and PND. Gastrointestinal: Positive for heartburn and nausea. Negative for abdominal pain, diarrhea and vomiting. Genitourinary: Negative. Skin: Negative for rash. Neurological: Positive for tingling and headaches. Negative for tremors, speech change, focal weakness, seizures and loss of consciousness. See HPI.     Past Medical History:   Diagnosis Date    Acquired hypothyroidism     hyperthyroid    Anemia NEC     Arthritis     Asthma     Bell's palsy 2018    CHF (congestive heart failure) (HCC)     Chronic pain     fibromyalgia    Colitis     GERD (gastroesophageal reflux disease)     Hypertension     diastolic    Ill-defined condition     ACL repair on L leg     Other unknown and unspecified cause of morbidity or mortality     fibromyalgia    Other unknown and unspecified cause of morbidity or mortality     obesity    Pleural effusion associated with pulmonary infection     Psychiatric problem     anxiety    PUD (peptic ulcer disease)     Type 2 diabetes with nephropathy ([facility] [address] 2018       Past Surgical History:   Procedure Laterality Date     DELIVERY ONLY      x2, one with classical uterine incision    HX  SECTION      x3 total    HX CHOLECYSTECTOMY      HX HEENT      sinus surgery    HX HERNIA REPAIR      HX ORTHOPAEDIC      ACL repair x2 - twice       Allergies   Allergen Reactions    Black Pepper Hives, Swelling and Cough    Mushroom Combination No.1 Shortness of Breath, Swelling and Cough    Tomato Hives, Swelling and Cough    Adhesive Tape-Silicones Hives     Causes burning at site    [employer] Hives    Dicyclomine Rash    Dilaudid [Hydromorphone (Bulk)] Itching    Doxycycline Hives    Gluten Other (comments)     GI upset    Morphine Rash    Tramadol Rash     Tolerates percocet       Current Outpatient Prescriptions   Medication Sig    SOD CHLOR,BICARB/SQUEEZ BOTTLE (NASAL RELIEF SINUS WASH-BOTTLE NA) USE AS DIRECTED    benzonatate (TESSALON) 200 mg capsule TAKE ONE CAPSULE BY MOUTH 3 TIMES A DAY AS NEEDED FOR COUGH FOR UP TO 10 DAYS    cetirizine (ZYRTEC) 10 mg tablet TAKE 1 TABLET BY MOUTH DAILY    fluticasone (FLONASE) 50 mcg/actuation nasal spray INHALE 2 SPRAYS EACH NOSTRIL DAILY    ADVAIR DISKUS 500-50 mcg/dose diskus inhaler INHALE CONTENTS OF 1 CAPSULE THROUGH INHALER TWICE DAILY. USE REGULARLY, RINSE MOUTH AFTER EACH USE.  montelukast (SINGULAIR) 10 mg tablet TAKE 1 TABLET BY MOUTH EVERY DAY    medroxyPROGESTERone (PROVERA) 10 mg tablet TAKE 1 TABLET BY MOUTH EVERY DAY FOR 10 DAYS IF NO MENSES AFTER 1&1/2 MONTHS PRN    acyclovir (ZOVIRAX) 800 mg tablet Take 1 Tab by mouth five (5) times daily for 7 days.  predniSONE (DELTASONE) 50 mg tablet Take 1 Tab by mouth daily for 7 days. With Breakfast    metoprolol tartrate (LOPRESSOR) 25 mg tablet Take 1 Tab by mouth two (2) times a day.  Dexlansoprazole (DEXILANT) 60 mg CpDB Take  by mouth.  ZOLMitriptan (ZOMIG-ZMT) 5 mg disintegrating tablet Take 1 Tab by mouth as needed for Migraine.  ipratropium (ATROVENT) 0.02 % nebulizer solution 2.5 mL by Nebulization route as needed for Wheezing.  ibuprofen-famotidine (DUEXIS) 800-26.6 mg tab Take 1 Tab by mouth three (3) times daily as needed.     MULTIVITAMIN PO Take  by mouth. Takes one tab po occasionally.  fluticasone-salmeterol (ADVAIR DISKUS) 250-50 mcg/dose diskus inhaler Take 1 Puff by inhalation every twelve (12) hours.  albuterol (PROVENTIL HFA, VENTOLIN HFA) 90 mcg/actuation inhaler Take 1-2 puffs by inhalation every four (4) hours as needed for Wheezing.  clotrimazole (LOTRIMIN) 1 % topical cream Apply  to affected area two (2) times a day.  magnesium 250 mg tab Take  by mouth. No current facility-administered medications for this visit.         Social History     Social History    Marital status:      Spouse name: N/A    Number of children: N/A    Years of education: N/A     Social History Main Topics    Smoking status: Never Smoker    Smokeless tobacco: Never Used    Alcohol use No    Drug use: No    Sexual activity: Yes     Partners: Male     Birth control/ protection: None     Other Topics Concern    None     Social History Narrative       Family History   Problem Relation Age of Onset    Diabetes Father     Heart Attack Father      62 smoker    Hypertension Father     Asthma Father     High Cholesterol Father     Stroke Father     Rashes/Skin Problems Father      eczema    Heart Disease Father     Hypertension Mother     Asthma Mother     Hypertension Maternal Grandmother     Stroke Maternal Grandmother     Heart Disease Maternal Grandfather     Diabetes Paternal Grandmother     Dementia Paternal Grandfather     Diabetes Brother     Diabetes Sister     Other Sister      ankylosing spondylitis    Hypertension Sister     Other Sister      ankylosing spondylitis    Hypertension Brother     Asthma Daughter    Catha Sprout Asthma Son     Asthma Son     Asthma Son     Asthma Son     Asthma Son          Physical Exam:  Visit Vitals    /70 (BP 1 Location: Left arm, BP Patient Position: Sitting)    Pulse 69    Temp 98.1 °F (36.7 °C) (Oral)    Resp 14    Ht 5' 4\" (1.626 m)    Wt 284 lb 6.4 oz (129 kg)    LMP 04/01/2018    SpO2 98%    BMI 48.82 kg/m2     Physical Exam   Constitutional: She is oriented to person, place, and time and well-developed, well-nourished, and in no distress. Morbidly obese AAF. NAD. HENT:   Head: Normocephalic and atraumatic. Right Ear: External ear normal.   Left Ear: External ear normal.   Mouth/Throat: Oropharynx is clear and moist.   Left facial droop including forehead/eyebrow. Mallampati of 2   Eyes: Conjunctivae and EOM are normal. Pupils are equal, round, and reactive to light. Neck: Normal range of motion. Neck supple. Cardiovascular: Normal rate, regular rhythm, normal heart sounds and intact distal pulses. Pulmonary/Chest: Effort normal and breath sounds normal.   Abdominal: Soft. There is no tenderness. Musculoskeletal: Normal range of motion. Lymphadenopathy:     She has no cervical adenopathy. Neurological: She is alert and oriented to person, place, and time. She has normal motor skills, normal strength and normal reflexes. Gait normal.   Skin: Skin is warm and dry. Psychiatric: Mood and affect normal.   Nursing note and vitals reviewed. Assessment/Plan:    ICD-10-CM ICD-9-CM    1. Hospital discharge follow-up Z09 V67.59    2. Bell's palsy G51.0 351.0    3. Essential hypertension I10 401.9    4. RIVER (dyspnea on exertion) R06.09 786.09 STRESS TEST CARDIAC   5. Atypical chest pain R07.89 786.59 STRESS TEST CARDIAC   6. Snores R06.83 786.09 REFERRAL TO SLEEP STUDIES   7. Risk factors for obstructive sleep apnea Z91.89 V49.89 REFERRAL TO SLEEP STUDIES   8. Fatigue, unspecified type R53.83 780.79 REFERRAL TO SLEEP STUDIES   9. Weight gain R63.5 783.1    10. BMI 45.0-49.9, adult ([facility] [address] Z68.42 V85.42 REFERRAL TO SLEEP STUDIES     Continue d/c instructions from hospital. Follow BP. Will continue to work on diet/wt loss. Urged pt to f/u soon for full physical with fasting labs and to address HM as she is overdue. Pt agrees.   Back to ER for any worsening or \"red flag\" sxs. Otherwise may rtc as needed if other sxs persist.  F/u with Neuro- needs to get in touch with them for results and further instruction/follow-up. Total time spent with the patient today was 35 minutes of which more than 50% was spent face to face with the patient. Follow-up Disposition:  Return in about 2 weeks (around 5/1/2018), or sooner if needed, for routine check-up with fasting labs/HM, and f/u, .     Pauline Larkin PA-C

## 2018-04-17 NOTE — MR AVS SNAPSHOT
[facility] 
[phone] Patient: Rose Marie Santos MRN: [id number] TEGAN:[birth year] Visit Information Date & Time Provider Department Dept. Phone Encounter #  
 4/17/2018  3:00 PM Pawel Garcia PA-C [facility] [phone] [id number] Upcoming Health Maintenance Date Due  
 FOOT EXAM Q1 1/23/1991 MICROALBUMIN Q1 1/23/1991 EYE EXAM RETINAL OR DILATED Q1 1/23/1991 DTaP/Tdap/Td series (1 - Tdap) 1/23/2002 PAP AKA CERVICAL CYTOLOGY 1/23/2002 Influenza Age 5 to Adult 8/1/2017 HEMOGLOBIN A1C Q6M 2/17/2018 LIPID PANEL Q1 8/17/2018 Allergies as of 4/17/2018  Review Complete On: 4/17/2018 By: Pawel Garcia PA-C Severity Noted Reaction Type Reactions Black Pepper Medium 09/15/2014    Hives, Swelling, Cough Mushroom Combination No.1 Medium 09/15/2014    Shortness of Breath, Swelling, Cough Tomato Medium 09/15/2014    Hives, Swelling, Cough Adhesive Tape-silicones  13/09/1594   Topical Hives Causes burning at site Beef Containing Products  02/28/2018    Hives Dicyclomine  07/27/2017    Rash Dilaudid [Hydromorphone (Bulk)]  09/16/2014    Itching Doxycycline  04/15/2018    Hives Gluten Low 06/13/2013    Other (comments) GI upset Morphine Low 01/17/2011   Side Effect Rash Tramadol Low 01/17/2011   Side Effect Rash Tolerates percocet Current Immunizations  Reviewed on 9/22/2014 Name Date Influenza Vaccine PF 10/1/2014  6:06 PM  
 Pneumococcal Polysaccharide (PPSV-23) 10/1/2014  6:08 PM  
 TB Skin Test (PPD) Intradermal 1/2/2018 Not reviewed this visit You Were Diagnosed With   
  
 Codes Comments Hospital discharge follow-up    -  Primary ICD-10-CM: [address] ICD-9-CM: V67.59 Bell's palsy     ICD-10-CM: G51.0 ICD-9-CM: 351.0   
 RIVER (dyspnea on exertion)     ICD-10-CM: R06.09 
ICD-9-CM: 786.09   
 Atypical chest pain     ICD-10-CM: R07.89 ICD-9-CM: 786.59 Snores     ICD-10-CM: R06.83 
ICD-9-CM: 786.09 BMI 45.0-49.9, adult [facility])     ICD-10-CM: Z47.04 
ICD-9-CM: V85.42 Risk factors for obstructive sleep apnea     ICD-10-CM: Z91.89 ICD-9-CM: V49.89 Fatigue, unspecified type     ICD-10-CM: R53.83 ICD-9-CM: 780.79 Vitals BP Pulse Temp Resp Height(growth percentile) Weight(growth percentile) 117/70 (BP 1 Location: Left arm, BP Patient Position: Sitting) 69 98.1 °F (36.7 °C) (Oral) 14 5' 4\" (1.626 m) 284 lb 6.4 oz (129 kg) LMP SpO2 BMI OB Status Smoking Status 04/01/2018 98% 48.82 kg/m2 Having regular periods Never Smoker BMI and BSA Data Body Mass Index Body Surface Area  
 48.82 kg/m 2 2.41 m 2 Preferred Pharmacy Pharmacy Name Phone [id number] [facility] [phone] Your Updated Medication List  
  
   
This list is accurate as of 4/17/18  3:47 PM.  Always use your most recent med list.  
  
  
  
  
 acyclovir 800 mg tablet Commonly known as:  ZOVIRAX Take 1 Tab by mouth five (5) times daily for 7 days. * ADVAIR DISKUS 250-50 mcg/dose diskus inhaler Generic drug:  fluticasone-salmeterol Take 1 Puff by inhalation every twelve (12) hours. * ADVAIR DISKUS 500-50 mcg/dose diskus inhaler Generic drug:  fluticasone-salmeterol INHALE CONTENTS OF 1 CAPSULE THROUGH INHALER TWICE DAILY. USE REGULARLY, RINSE MOUTH AFTER EACH USE.  
  
 albuterol 90 mcg/actuation inhaler Commonly known as:  PROVENTIL HFA, VENTOLIN HFA, PROAIR HFA Take 1-2 puffs by inhalation every four (4) hours as needed for Wheezing. benzonatate 200 mg capsule Commonly known as:  TESSALON  
TAKE ONE CAPSULE BY MOUTH 3 TIMES A DAY AS NEEDED FOR COUGH FOR UP TO 10 DAYS  
  
 cetirizine 10 mg tablet Commonly known as:  ZYRTEC  
TAKE 1 TABLET BY MOUTH DAILY clotrimazole 1 % topical cream  
Commonly known as:  Grayson Beau Apply  to affected area two (2) times a day. DEXILANT 60 mg Cpdb Generic drug:  Dexlansoprazole Take  by mouth. DUEXIS 800-26.6 mg Tab Generic drug:  ibuprofen-famotidine Take 1 Tab by mouth three (3) times daily as needed. fluticasone 50 mcg/actuation nasal spray Commonly known as:  Courtney Hoot INHALE 2 SPRAYS EACH NOSTRIL DAILY  
  
 ipratropium 0.02 % Soln Commonly known as:  ATROVENT  
2.5 mL by Nebulization route as needed for Wheezing.  
  
 magnesium 250 mg Tab Take  by mouth.  
  
 medroxyPROGESTERone 10 mg tablet Commonly known as:  PROVERA TAKE 1 TABLET BY MOUTH EVERY DAY FOR 10 DAYS IF NO MENSES AFTER 1&1/2 MONTHS PRN  
  
 metoprolol tartrate 25 mg tablet Commonly known as:  LOPRESSOR Take 1 Tab by mouth two (2) times a day. montelukast 10 mg tablet Commonly known as:  SINGULAIR  
TAKE 1 TABLET BY MOUTH EVERY DAY  
  
 MULTIVITAMIN PO Take  by mouth. Takes one tab po occasionally. NASAL RELIEF SINUS WASH-BOTTLE NA  
USE AS DIRECTED  
  
 predniSONE 50 mg tablet Commonly known as:  Chayito Kathryn Take 1 Tab by mouth daily for 7 days. With Breakfast  
  
 ZOLMitriptan 5 mg disintegrating tablet Commonly known as:  ZOMIG-ZMT Take 1 Tab by mouth as needed for Migraine. * Notice: This list has 2 medication(s) that are the same as other medications prescribed for you. Read the directions carefully, and ask your doctor or other care provider to review them with you. We Performed the Following REFERRAL TO SLEEP STUDIES [REF99 Custom] Comments: Pt prefers [facility]. To-Do List   
 04/24/2018 ECG:  STRESS TEST CARDIAC Referral Information Referral ID Referred By Referred To  
  
 [id number] Murrel Libman S Not Available Visits Status Start Date End Date 1 New Request 4/17/18 4/17/19 If your referral has a status of pending review or denied, additional information will be sent to support the outcome of this decision. Referral ID Referred By Referred To  
 [id number] Rhona DUMONT Not Available Visits Status Start Date End Date 1 New Request 4/17/18 4/17/19 If your referral has a status of pending review or denied, additional information will be sent to support the outcome of this decision. Introducing [facility] SERVICES! [employer] introduces [employer] patient portal. Now you can access parts of your medical record, email your doctor's office, and request medication refills online. 1. In your internet browser, go to [email or web address] 2. Click on the First Time User? Click Here link in the Sign In box. You will see the New Member Sign Up page. 3. Enter your [employer] Access Code exactly as it appears below. You will not need to use this code after youve completed the sign-up process. If you do not sign up before the expiration date, you must request a new code. · [employer] Access Code: [email or web address] Expires: 7/14/2018  5:04 PM 
 
4. Enter the last four digits of your Social Security Number (xxxx) and Date of Birth (mm/dd/yyyy) as indicated and click Submit. You will be taken to the next sign-up page. 5. Create a [employer] ID. This will be your [employer] login ID and cannot be changed, so think of one that is secure and easy to remember. 6. Create a [employer] password. You can change your password at any time. 7. Enter your Password Reset Question and Answer. This can be used at a later time if you forget your password. 8. Enter your e-mail address. You will receive e-mail notification when new information is available in [address] 9. Click Sign Up. You can now view and download portions of your medical record. 10. Click the Download Summary menu link to download a portable copy of your medical information. If you have questions, please visit the Frequently Asked Questions section of the [employer] website. Remember, [employer] is NOT to be used for urgent needs. For medical emergencies, dial 911. Now available from your iPhone and Android! Please provide this summary of care documentation to your next provider. Your primary care clinician is listed as Natasha Khan. If you have any questions after today's visit, please call [phone].

## 2018-04-27 ENCOUNTER — OFFICE VISIT (OUTPATIENT)
Dept: FAMILY MEDICINE CLINIC | Age: 38
End: 2018-04-27

## 2018-04-27 VITALS
HEART RATE: 73 BPM | TEMPERATURE: 98.8 F | DIASTOLIC BLOOD PRESSURE: 82 MMHG | RESPIRATION RATE: 16 BRPM | SYSTOLIC BLOOD PRESSURE: 131 MMHG | OXYGEN SATURATION: 97 % | BODY MASS INDEX: 47.91 KG/M2 | WEIGHT: 280.6 LBS | HEIGHT: 64 IN

## 2018-04-27 DIAGNOSIS — E66.01 OBESITY, MORBID (HCC): ICD-10-CM

## 2018-04-27 DIAGNOSIS — Z13.29 THYROID DISORDER SCREEN: ICD-10-CM

## 2018-04-27 DIAGNOSIS — E53.8 B12 DEFICIENCY: ICD-10-CM

## 2018-04-27 DIAGNOSIS — K21.9 GASTROESOPHAGEAL REFLUX DISEASE WITHOUT ESOPHAGITIS: ICD-10-CM

## 2018-04-27 DIAGNOSIS — B35.3 TINEA PEDIS OF LEFT FOOT: ICD-10-CM

## 2018-04-27 DIAGNOSIS — R73.03 PRE-DIABETES: Primary | ICD-10-CM

## 2018-04-27 DIAGNOSIS — E55.9 VITAMIN D DEFICIENCY: ICD-10-CM

## 2018-04-27 DIAGNOSIS — Z13.220 SCREENING FOR LIPID DISORDERS: ICD-10-CM

## 2018-04-27 LAB — HBA1C MFR BLD HPLC: 6.1 %

## 2018-04-27 RX ORDER — HYDROCHLOROTHIAZIDE 25 MG/1
25 TABLET ORAL DAILY
COMMUNITY
End: 2018-08-24 | Stop reason: SDUPTHER

## 2018-04-27 NOTE — MR AVS SNAPSHOT
[place] [address] 
[phone] Patient: Dennys Olea MRN: [id number] Visit Information Date & Time Provider Department Dept. Phone Encounter #  
 4/27/2018  8:00 AM Carmen Manzo PA-C [address] [id number] Upcoming Health Maintenance Date Due  
 FOOT EXAM Q1 1/23/1991 MICROALBUMIN Q1 1/23/1991 EYE EXAM RETINAL OR DILATED Q1 1/23/1991 DTaP/Tdap/Td series (1 - Tdap) 1/23/2002 PAP AKA CERVICAL CYTOLOGY 1/23/2002 HEMOGLOBIN A1C Q6M 2/17/2018 Influenza Age 5 to Adult 8/1/2018 LIPID PANEL Q1 8/17/2018 Allergies as of 4/27/2018  Review Complete On: 4/27/2018 By: Carmen Manzo PA-C Severity Noted Reaction Type Reactions Black Pepper Medium 09/15/2014    Hives, Swelling, Cough Mushroom Combination No.1 Medium 09/15/2014    Shortness of Breath, Swelling, Cough Tomato Medium 09/15/2014    Hives, Swelling, Cough Adhesive Tape-silicones  92/25/5948   Topical Hives Causes burning at site Beef Containing Products  02/28/2018    Hives Dicyclomine  07/27/2017    Rash Pt says not allergic to dicyclomine Dilaudid [Hydromorphone (Bulk)]  09/16/2014    Itching Doxycycline  04/15/2018    Hives Gluten Low 06/13/2013    Other (comments) GI upset Morphine Low 01/17/2011   Side Effect Rash Tramadol Low 01/17/2011   Side Effect Rash Tolerates percocet Current Immunizations  Reviewed on 9/22/2014 Name Date Influenza Vaccine 1/8/2018 Influenza Vaccine PF 10/1/2014  6:06 PM  
 Pneumococcal Polysaccharide (PPSV-23) 10/1/2014  6:08 PM  
 TB Skin Test (PPD) Intradermal 1/2/2018 Not reviewed this visit You Were Diagnosed With   
  
 Codes Comments  Type 2 diabetes with nephropathy (HCC)    -  Primary ICD-10-CM: E11.21 
ICD-9-CM: 250.40, 583.81   
 Ob[facility] mor[facility]     ICD-10-CM: E66.01 
ICD-9-CM: 278.01 Gastroesophageal reflux disease without esophagitis     ICD-10-CM: K21.9 ICD-9-CM: 530.81   
 B12 deficiency     ICD-10-CM: E53.8 ICD-9-CM: 266.2 Vitamin D deficiency     ICD-10-CM: E55.9 ICD-9-CM: 268.9 Screening for lipid disorders     ICD-10-CM: Z13.220 ICD-9-CM: V77.91 Tinea pedis of left foot     ICD-10-CM: B35.3 ICD-9-CM: 110.4 Vitals BP Pulse Temp Resp Height(growth percentile) Weight(growth percentile) 131/82 (BP 1 Location: Left arm, BP Patient Position: Sitting) 73 98.8 °F (37.1 °C) (Oral) 16 5' 4\" (1.626 m) 280 lb 9.6 oz (127.3 kg) LMP SpO2 BMI OB Status Smoking Status 04/01/2018 97% 48.16 kg/m2 Having regular periods Never Smoker BMI and BSA Data Body Mass Index Body Surface Area  
 48.16 kg/m 2 2.4 m 2 Preferred Pharmacy Pharmacy Name Phone [phone] [facility] [phone] Your Updated Medication List  
  
   
This list is accurate as of 4/27/18  8:53 AM.  Always use your most recent med list.  
  
  
  
  
 ADVAIR DISKUS 500-50 mcg/dose diskus inhaler Generic drug:  fluticasone-salmeterol INHALE CONTENTS OF 1 CAPSULE THROUGH INHALER TWICE DAILY. USE REGULARLY, RINSE MOUTH AFTER EACH USE.  
  
 albuterol 90 mcg/actuation inhaler Commonly known as:  PROVENTIL HFA, VENTOLIN HFA, PROAIR HFA Take 1-2 puffs by inhalation every four (4) hours as needed for Wheezing. benzonatate 200 mg capsule Commonly known as:  TESSALON  
TAKE ONE CAPSULE BY MOUTH 3 TIMES A DAY AS NEEDED FOR COUGH FOR UP TO 10 DAYS  
  
 cetirizine 10 mg tablet Commonly known as:  ZYRTEC  
TAKE 1 TABLET BY MOUTH DAILY  
  
 clotrimazole 1 % topical cream  
Commonly known as:  Henryetta Raid Apply  to affected area two (2) times a day. DEXILANT 60 mg Cpdb Generic drug:  Dexlansoprazole Take  by mouth. DUEXIS 800-26.6 mg Tab Generic drug:  ibuprofen-famotidine Take 1 Tab by mouth three (3) times daily as needed. fluticasone 50 mcg/actuation nasal spray Commonly known as:  Dk Arriaza INHALE 2 SPRAYS EACH NOSTRIL DAILY  
  
 hydroCHLOROthiazide 25 mg tablet Commonly known as:  HYDRODIURIL Take 25 mg by mouth daily. ipratropium 0.02 % Soln Commonly known as:  ATROVENT  
2.5 mL by Nebulization route as needed for Wheezing.  
  
 magnesium 250 mg Tab Take  by mouth.  
  
 medroxyPROGESTERone 10 mg tablet Commonly known as:  PROVERA TAKE 1 TABLET BY MOUTH EVERY DAY FOR 10 DAYS IF NO MENSES AFTER 1&1/2 MONTHS PRN  
  
 metoprolol tartrate 25 mg tablet Commonly known as:  LOPRESSOR Take 1 Tab by mouth two (2) times a day. montelukast 10 mg tablet Commonly known as:  SINGULAIR  
TAKE 1 TABLET BY MOUTH EVERY DAY  
  
 MULTIVITAMIN PO Take  by mouth. Takes one tab po occasionally. NASAL RELIEF SINUS WASH-BOTTLE NA  
USE AS DIRECTED  
  
 ZOLMitriptan 5 mg disintegrating tablet Commonly known as:  ZOMIG-ZMT Take 1 Tab by mouth as needed for Migraine. We Performed the Following AMB POC HEMOGLOBIN A1C [30898 CPT(R)] COLLECTION VENOUS BLOOD,VENIPUNCTURE [id number] CPT(R)]  DIABETES FOOT EXAM [7 Custom] LIPID PANEL [20810 CPT(R)] METABOLIC PANEL, BASIC [74760 CPT(R)] MICROALBUMIN, UR, RAND W/ MICROALB/CREAT RATIO [id number] CPT(R)] UT HANDLG&/OR CONVEY OF SPEC FOR TR OFFICE TO LAB [36784 CPT(R)] URINALYSIS W/ RFLX MICROSCOPIC [05841 CPT(R)] VITAMIN B12 & FOLATE [86430 CPT(R)] VITAMIN D, 25 HYDROXY [id number] CPT(R)] To-Do List   
 04/30/2018 9:15 AM  
(Arrive by 9:00 AM) Appointment with [address] at [facility] NON-INVASIVE CARD ([phone]) 1. Hold cardiac medications and BETA blockers for 24 hours. Call your physician with questions. 2. Do not eat or drink after midnight.  3. Do not have any caffeine after midnight. 4. Please arrive wearing comfortable clothes suitable for exercise and flat, rubber-soled shoes. The patient will be walked on a treadmill. 5. Please bring a list of all current medications. Patient Instructions Athlete's Foot: Care Instructions Your Care Instructions Athlete's foot is an itchy rash on the foot caused by an infection with a fungus. You can get it by going barefoot in wet public areas, such as swimming pools or locker rooms. Many times there is no clear reason why you get athlete's foot. You can easily treat athlete's foot by putting medicine on your feet for 1 to 6 weeks. In some cases, a doctor may prescribe pills to kill the fungus. Follow-up care is a key part of your treatment and safety. Be sure to make and go to all appointments, and call your doctor if you are having problems. It's also a good idea to know your test results and keep a list of the medicines you take. How can you care for yourself at home? · Your doctor may suggest an over-the counter lotion or spray or may prescribe a medicine. Take your medicines exactly as prescribed. Call your doctor if you think you are having a problem with your medicine. · Keep your feet clean and dry. · When you get dressed, put your socks on before your underwear. This can prevent the fungus from spreading from your feet to your groin. To prevent athlete's foot · Wear flip-flops or other shower sandals in public locker rooms and showers and by the pool. · Dry between your toes after swimming or bathing. · Wear leather shoes or sandals, which let air get to your feet. · Change your socks as needed so your feet stay as dry as possible. · Use antifungal powder on your feet. When should you call for help? Watch closely for changes in your health, and be sure to contact your doctor if: 
· You do not get better as expected. Where can you learn more? Go to http://moe-antonio.info/. Enter M498 in the search box to learn more about \"Athlete's Foot: Care Instructions. \" Current as of: October 13, 2016 Content Version: 11.4 © 3578-8926 [employer]. Care instructions adapted under license by [employer] (which disclaims liability or warranty for this information). If you have questions about a medical condition or this instruction, always ask your healthcare professional. [facility] any warranty or liability for your use of this information. Learning About Diabetes Food Guidelines Your Care Instructions Meal planning is important to manage diabetes. It helps keep your blood sugar at a target level (which you set with your doctor). You don't have to eat special foods. You can eat what your family eats, including sweets once in a while. But you do have to pay attention to how often you eat and how much you eat of certain foods. You may want to work with a dietitian or a certified diabetes educator (CDE) to help you plan meals and snacks. A dietitian or CDE can also help you lose weight if that is one of your goals. What should you know about eating carbs? Managing the amount of carbohydrate (carbs) you eat is an important part of healthy meals when you have diabetes. Carbohydrate is found in many foods. · Learn which foods have carbs. And learn the amounts of carbs in different foods. ¨ Bread, cereal, pasta, and rice have about 15 grams of carbs in a serving. A serving is 1 slice of bread (1 ounce), ½ cup of cooked cereal, or 1/3 cup of cooked pasta or rice. ¨ Fruits have 15 grams of carbs in a serving. A serving is 1 small fresh fruit, such as an apple or orange; ½ of a banana; ½ cup of cooked or canned fruit; ½ cup of fruit juice; 1 cup of melon or raspberries; or 2 tablespoons of dried fruit. ¨ Milk and no-sugar-added yogurt have 15 grams of carbs in a serving.  A serving is 1 cup of milk or 2/3 cup of no-sugar-added yogurt. ¨ Starchy vegetables have 15 grams of carbs in a serving. A serving is ½ cup of mashed potatoes or sweet potato; 1 cup winter squash; ½ of a small baked potato; ½ cup of cooked beans; or ½ cup cooked corn or green peas. · Learn how much carbs to eat each day and at each meal. A dietitian or CDE can teach you how to keep track of the amount of carbs you eat. This is called carbohydrate counting. · If you are not sure how to count carbohydrate grams, use the Plate Method to plan meals. It is a good, quick way to make sure that you have a balanced meal. It also helps you spread carbs throughout the day. ¨ Divide your plate by types of foods. Put non-starchy vegetables on half the plate, meat or other protein food on one-quarter of the plate, and a grain or starchy vegetable in the final quarter of the plate. To this you can add a small piece of fruit and 1 cup of milk or yogurt, depending on how many carbs you are supposed to eat at a meal. 
· Try to eat about the same amount of carbs at each meal. Do not \"save up\" your daily allowance of carbs to eat at one meal. 
· Proteins have very little or no carbs per serving. Examples of proteins are beef, chicken, turkey, fish, eggs, tofu, cheese, cottage cheese, and peanut butter. A serving size of meat is 3 ounces, which is about the size of a deck of cards. Examples of meat substitute serving sizes (equal to 1 ounce of meat) are 1/4 cup of cottage cheese, 1 egg, 1 tablespoon of peanut butter, and ½ cup of tofu. How can you eat out and still eat healthy? · Learn to estimate the serving sizes of foods that have carbohydrate. If you measure food at home, it will be easier to estimate the amount in a serving of restaurant food. · If the meal you order has too much carbohydrate (such as potatoes, corn, or baked beans), ask to have a low-carbohydrate food instead. Ask for a salad or green vegetables. · If you use insulin, check your blood sugar before and after eating out to help you plan how much to eat in the future. · If you eat more carbohydrate at a meal than you had planned, take a walk or do other exercise. This will help lower your blood sugar. What else should you know? · Limit saturated fat, such as the fat from meat and dairy products. This is a healthy choice because people who have diabetes are at higher risk of heart disease. So choose lean cuts of meat and nonfat or low-fat dairy products. Use olive or canola oil instead of butter or shortening when cooking. · Don't skip meals. Your blood sugar may drop too low if you skip meals and take insulin or certain medicines for diabetes. · Check with your doctor before you drink alcohol. Alcohol can cause your blood sugar to drop too low. Alcohol can also cause a bad reaction if you take certain diabetes medicines. Follow-up care is a key part of your treatment and safety. Be sure to make and go to all appointments, and call your doctor if you are having problems. It's also a good idea to know your test results and keep a list of the medicines you take. Where can you learn more? Go to http://moe-antonio.info/. Enter C676 in the search box to learn more about \"Learning About Diabetes Food Guidelines. \" Current as of: March 13, 2017 Content Version: 11.4 © 5831-0184 [employer]wise[employer] Inc[employer]. Care instructions adapted under license by [employer] (which disclaims liability or warranty for this information). If you have questions about a medical condition or this instruction, always ask your healthcare professional. [place] [address] any warranty or liability for your use of this information. Diabetes Foot Health: Care Instructions Your Care Instructions When you have diabetes, your feet need extra care and attention.  Diabetes can damage the nerve endings and blood vessels in your feet, making you less likely to notice when your feet are injured. Diabetes also limits your body's ability to fight infection and get blood to areas that need it. If you get a minor foot injury, it could become an ulcer or a serious infection. With good foot care, you can prevent most of these problems. Caring for your feet can be quick and easy. Most of the care can be done when you are bathing or getting ready for bed. Follow-up care is a key part of your treatment and safety. Be sure to make and go to all appointments, and call your doctor if you are having problems. It's also a good idea to know your test results and keep a list of the medicines you take. How can you care for yourself at home? · Keep your blood sugar close to normal by watching what and how much you eat, monitoring blood sugar, taking medicines if prescribed, and getting regular exercise. · Do not smoke. Smoking affects blood flow and can make foot problems worse. If you need help quitting, talk to your doctor about stop-smoking programs and medicines. These can increase your chances of quitting for good. · Eat a diet that is low in fats. High fat intake can cause fat to build up in your blood vessels and decrease blood flow. · Inspect your feet daily for blisters, cuts, cracks, or sores. If you cannot see well, use a mirror or have someone help you. · Take care of your feet: 
[facility] AUTHORITY your feet every day. Use warm (not hot) water. Check the water temperature with your wrists or other part of your body, not your feet. ¨ Dry your feet well. Pat them dry. Do not rub the skin on your feet too hard. Dry well between your toes. If the skin on your feet stays moist, bacteria or a fungus can grow, which can lead to infection. ¨ Keep your skin soft. Use moisturizing skin cream to keep the skin on your feet soft and prevent calluses and cracks.  But do not put the cream between your toes, and stop using any cream that causes a rash. ¨ Clean underneath your toenails carefully. Do not use a sharp object to clean underneath your toenails. Use the blunt end of a nail file or other rounded tool. ¨ Trim and file your toenails straight across to prevent ingrown toenails. Use a nail clipper, not scissors. Use an emery board to smooth the edges. · Change socks daily. Socks without seams are best, because seams often rub the feet. You can find socks for people with diabetes from specialty catalogs. · Look inside your shoes every day for things like gravel or torn linings, which could cause blisters or sores. · Buy shoes that fit well: 
¨ Look for shoes that have plenty of space around the toes. This helps prevent bunions and blisters. ¨ Try on shoes while wearing the kind of socks you will usually wear with the shoes. ¨ Avoid plastic shoes. They may rub your feet and cause blisters. Good shoes should be made of materials that are flexible and breathable, such as leather or cloth. ¨ Break in new shoes slowly by wearing them for no more than an hour a day for several days. Take extra time to check your feet for red areas, blisters, or other problems after you wear new shoes. · Do not go barefoot. Do not wear sandals, and do not wear shoes with very thin soles. Thin soles are easy to puncture. They also do not protect your feet from hot pavement or cold weather. · Have your doctor check your feet during each visit. If you have a foot problem, see your doctor. Do not try to treat an early foot problem at home. Home remedies or treatments that you can buy without a prescription (such as corn removers) can be harmful. · Always get early treatment for foot problems. A minor irritation can lead to a major problem if not properly cared for early. When should you call for help? Call your doctor now or seek immediate medical care if: ? · You have a foot sore, an ulcer or break in the skin that is not healing after 4 days, bleeding corns or calluses, or an ingrown toenail. ? · You have blue or black areas, which can mean bruising or blood flow problems. ? · You have peeling skin or tiny blisters between your toes or cracking or oozing of the skin. ? · You have a fever for more than 24 hours and a foot sore. ? · You have new numbness or tingling in your feet that does not go away after you move your feet or change positions. ? · You have unexplained or unusual swelling of the foot or ankle. ? Watch closely for changes in your health, and be sure to contact your doctor if: 
? · You cannot do proper foot care. Where can you learn more? Go to http://moe-antonio.info/. Enter A739 in the search box to learn more about \"Diabetes Foot Health: Care Instructions. \" Current as of: March 13, 2017 Content Version: 11.4 © 5314-4418 [employer]. Care instructions adapted under license by [employer] (which disclaims liability or warranty for this information). If you have questions about a medical condition or this instruction, always ask your healthcare professional. [place] [address] any warranty or liability for your use of this information. Introducing [facility]! [facility] introduces [employer] patient portal. Now you can access parts of your medical record, email your doctor's office, and request medication refills online. 1. In your internet browser, go to [email or web address] 2. Click on the First Time User? Click Here link in the Sign In box. You will see the New Member Sign Up page. 3. Enter your [employer] Access Code exactly as it appears below. You will not need to use this code after youve completed the sign-up process. If you do not sign up before the expiration date, you must request a new code. · [employer] Access Code: [email or web address] Expires: 7/14/2018  5:04 PM 
 
4. Enter the last four digits of your Social Security Number (xxxx) and Date of Birth (mm/dd/yyyy) as indicated and click Submit. You will be taken to the next sign-up page. 5. Create a [employer] ID. This will be your [employer] login ID and cannot be changed, so think of one that is secure and easy to remember. 6. Create a [employer] password. You can change your password at any time. 7. Enter your Password Reset Question and Answer. This can be used at a later time if you forget your password. 8. Enter your e-mail address. You will receive e-mail notification when new information is available in [address] 9. Click Sign Up. You can now view and download portions of your medical record. 10. Click the Download Summary menu link to download a portable copy of your medical information. If you have questions, please visit the Frequently Asked Questions section of the [employer] website. Remember, [employer] is NOT to be used for urgent needs. For medical emergencies, dial 911. Now available from your iPhone and Android! Please provide this summary of care documentation to your next provider. Your primary care clinician is listed as [place]. If you have any questions after today's visit, please call [phone].

## 2018-04-27 NOTE — PATIENT INSTRUCTIONS
Athlete's Foot: Care Instructions  Your Care Instructions    Athlete's foot is an itchy rash on the foot caused by an infection with a fungus. You can get it by going barefoot in wet public areas, such as swimming pools or locker rooms. Many times there is no clear reason why you get athlete's foot. You can easily treat athlete's foot by putting medicine on your feet for 1 to 6 weeks. In some cases, a doctor may prescribe pills to kill the fungus. Follow-up care is a key part of your treatment and safety. Be sure to make and go to all appointments, and call your doctor if you are having problems. It's also a good idea to know your test results and keep a list of the medicines you take. How can you care for yourself at home? · Your doctor may suggest an over-the counter lotion or spray or may prescribe a medicine. Take your medicines exactly as prescribed. Call your doctor if you think you are having a problem with your medicine. · Keep your feet clean and dry. · When you get dressed, put your socks on before your underwear. This can prevent the fungus from spreading from your feet to your groin. To prevent athlete's foot  · Wear flip-flops or other shower sandals in public locker rooms and showers and by the pool. · Dry between your toes after swimming or bathing. · Wear leather shoes or sandals, which let air get to your feet. · Change your socks as needed so your feet stay as dry as possible. · Use antifungal powder on your feet. When should you call for help? Watch closely for changes in your health, and be sure to contact your doctor if:  · You do not get better as expected. Where can you learn more? Go to http://moe-antonio.info/. Enter M498 in the search box to learn more about \"Athlete's Foot: Care Instructions. \"  Current as of: October 13, 2016  Content Version: 11.4  © 4506-9891 [employer], [employer].  Care instructions adapted under license by [facility] (which disclaims liability or warranty for this information). If you have questions about a medical condition or this instruction, always ask your healthcare professional. [address] any warranty or liability for your use of this information. Learning About Diabetes Food Guidelines  Your Care Instructions    Meal planning is important to manage diabetes. It helps keep your blood sugar at a target level (which you set with your doctor). You don't have to eat special foods. You can eat what your family eats, including sweets once in a while. But you do have to pay attention to how often you eat and how much you eat of certain foods. You may want to work with a dietitian or a certified diabetes educator (CDE) to help you plan meals and snacks. A dietitian or CDE can also help you lose weight if that is one of your goals. What should you know about eating carbs? Managing the amount of carbohydrate (carbs) you eat is an important part of healthy meals when you have diabetes. Carbohydrate is found in many foods. · Learn which foods have carbs. And learn the amounts of carbs in different foods. ¨ Bread, cereal, pasta, and rice have about 15 grams of carbs in a serving. A serving is 1 slice of bread (1 ounce), ½ cup of cooked cereal, or 1/3 cup of cooked pasta or rice. ¨ Fruits have 15 grams of carbs in a serving. A serving is 1 small fresh fruit, such as an apple or orange; ½ of a banana; ½ cup of cooked or canned fruit; ½ cup of fruit juice; 1 cup of melon or raspberries; or 2 tablespoons of dried fruit. ¨ Milk and no-sugar-added yogurt have 15 grams of carbs in a serving. A serving is 1 cup of milk or 2/3 cup of no-sugar-added yogurt. ¨ Starchy vegetables have 15 grams of carbs in a serving. A serving is ½ cup of mashed potatoes or sweet potato; 1 cup winter squash; ½ of a small baked potato; ½ cup of cooked beans; or ½ cup cooked corn or green peas.   · Learn how much carbs to eat each day and at each meal. A dietitian or CDE can teach you how to keep track of the amount of carbs you eat. This is called carbohydrate counting. · If you are not sure how to count carbohydrate grams, use the Plate Method to plan meals. It is a good, quick way to make sure that you have a balanced meal. It also helps you spread carbs throughout the day. ¨ Divide your plate by types of foods. Put non-starchy vegetables on half the plate, meat or other protein food on one-quarter of the plate, and a grain or starchy vegetable in the final quarter of the plate. To this you can add a small piece of fruit and 1 cup of milk or yogurt, depending on how many carbs you are supposed to eat at a meal.  · Try to eat about the same amount of carbs at each meal. Do not \"save up\" your daily allowance of carbs to eat at one meal.  · Proteins have very little or no carbs per serving. Examples of proteins are beef, chicken, turkey, fish, eggs, tofu, cheese, cottage cheese, and peanut butter. A serving size of meat is 3 ounces, which is about the size of a deck of cards. Examples of meat substitute serving sizes (equal to 1 ounce of meat) are 1/4 cup of cottage cheese, 1 egg, 1 tablespoon of peanut butter, and ½ cup of tofu. How can you eat out and still eat healthy? · Learn to estimate the serving sizes of foods that have carbohydrate. If you measure food at home, it will be easier to estimate the amount in a serving of restaurant food. · If the meal you order has too much carbohydrate (such as potatoes, corn, or baked beans), ask to have a low-carbohydrate food instead. Ask for a salad or green vegetables. · If you use insulin, check your blood sugar before and after eating out to help you plan how much to eat in the future. · If you eat more carbohydrate at a meal than you had planned, take a walk or do other exercise. This will help lower your blood sugar. What else should you know?   · Limit saturated fat, such as the fat from meat and dairy products. This is a healthy choice because people who have diabetes are at higher risk of heart disease. So choose lean cuts of meat and nonfat or low-fat dairy products. Use olive or canola oil instead of butter or shortening when cooking. · Don't skip meals. Your blood sugar may drop too low if you skip meals and take insulin or certain medicines for diabetes. · Check with your doctor before you drink alcohol. Alcohol can cause your blood sugar to drop too low. Alcohol can also cause a bad reaction if you take certain diabetes medicines. Follow-up care is a key part of your treatment and safety. Be sure to make and go to all appointments, and call your doctor if you are having problems. It's also a good idea to know your test results and keep a list of the medicines you take. Where can you learn more? Go to http://moeCore Stixantnoio.info/. Enter W516 in the search box to learn more about \"Learning About Diabetes Food Guidelines. \"  Current as of: March 13, 2017  Content Version: 11.4  © 9757-1942 [employer]. Care instructions adapted under license by [employer] (which disclaims liability or warranty for this information). If you have questions about a medical condition or this instruction, always ask your healthcare professional. [address] any warranty or liability for your use of this information. Diabetes Foot Health: Care Instructions  Your Care Instructions    When you have diabetes, your feet need extra care and attention. Diabetes can damage the nerve endings and blood vessels in your feet, making you less likely to notice when your feet are injured. Diabetes also limits your body's ability to fight infection and get blood to areas that need it. If you get a minor foot injury, it could become an ulcer or a serious infection. With good foot care, you can prevent most of these problems.   Caring for your feet can be quick and easy. Most of the care can be done when you are bathing or getting ready for bed. Follow-up care is a key part of your treatment and safety. Be sure to make and go to all appointments, and call your doctor if you are having problems. It's also a good idea to know your test results and keep a list of the medicines you take. How can you care for yourself at home? · Keep your blood sugar close to normal by watching what and how much you eat, monitoring blood sugar, taking medicines if prescribed, and getting regular exercise. · Do not smoke. Smoking affects blood flow and can make foot problems worse. If you need help quitting, talk to your doctor about stop-smoking programs and medicines. These can increase your chances of quitting for good. · Eat a diet that is low in fats. High fat intake can cause fat to build up in your blood vessels and decrease blood flow. · Inspect your feet daily for blisters, cuts, cracks, or sores. If you cannot see well, use a mirror or have someone help you. · Take care of your feet:  [facility] your feet every day. Use warm (not hot) water. Check the water temperature with your wrists or other part of your body, not your feet. ¨ Dry your feet well. Pat them dry. Do not rub the skin on your feet too hard. Dry well between your toes. If the skin on your feet stays moist, bacteria or a fungus can grow, which can lead to infection. ¨ Keep your skin soft. Use moisturizing skin cream to keep the skin on your feet soft and prevent calluses and cracks. But do not put the cream between your toes, and stop using any cream that causes a rash. ¨ Clean underneath your toenails carefully. Do not use a sharp object to clean underneath your toenails. Use the blunt end of a nail file or other rounded tool. ¨ Trim and file your toenails straight across to prevent ingrown toenails. Use a nail clipper, not scissors. Use an emery board to smooth the edges.   · Change socks daily. Socks without seams are best, because seams often rub the feet. You can find socks for people with diabetes from specialty catalogs. · Look inside your shoes every day for things like gravel or torn linings, which could cause blisters or sores. · Buy shoes that fit well:  ¨ Look for shoes that have plenty of space around the toes. This helps prevent bunions and blisters. ¨ Try on shoes while wearing the kind of socks you will usually wear with the shoes. ¨ Avoid plastic shoes. They may rub your feet and cause blisters. Good shoes should be made of materials that are flexible and breathable, such as leather or cloth. ¨ Break in new shoes slowly by wearing them for no more than an hour a day for several days. Take extra time to check your feet for red areas, blisters, or other problems after you wear new shoes. · Do not go barefoot. Do not wear sandals, and do not wear shoes with very thin soles. Thin soles are easy to puncture. They also do not protect your feet from hot pavement or cold weather. · Have your doctor check your feet during each visit. If you have a foot problem, see your doctor. Do not try to treat an early foot problem at home. Home remedies or treatments that you can buy without a prescription (such as corn removers) can be harmful. · Always get early treatment for foot problems. A minor irritation can lead to a major problem if not properly cared for early. When should you call for help? Call your doctor now or seek immediate medical care if:  ? · You have a foot sore, an ulcer or break in the skin that is not healing after 4 days, bleeding corns or calluses, or an ingrown toenail. ? · You have blue or black areas, which can mean bruising or blood flow problems. ? · You have peeling skin or tiny blisters between your toes or cracking or oozing of the skin. ? · You have a fever for more than 24 hours and a foot sore.    ? · You have new numbness or tingling in your feet that does not go away after you move your feet or change positions. ? · You have unexplained or unusual swelling of the foot or ankle. ? Watch closely for changes in your health, and be sure to contact your doctor if:  ? · You cannot do proper foot care. Where can you learn more? Go to http://moe-antonio.info/. Enter A739 in the search box to learn more about \"Diabetes Foot Health: Care Instructions. \"  Current as of: March 13, 2017  Content Version: 11.4  © 7960-3135 [employer]. Care instructions adapted under license by [employer] (which disclaims liability or warranty for this information). If you have questions about a medical condition or this instruction, always ask your healthcare professional. [address] any warranty or liability for your use of this information.

## 2018-04-27 NOTE — PROGRESS NOTES
Chief Complaint   Patient presents with    Diabetes     pt here for FLW, HM due      Health Maintenance Due   Topic Date Due    FOOT EXAM Q1  01/23/1991    MICROALBUMIN Q1  01/23/1991    EYE EXAM RETINAL OR DILATED Q1  01/23/1991    DTaP/Tdap/Td series (1 - Tdap) 01/23/2002    PAP AKA CERVICAL CYTOLOGY  01/23/2002    HEMOGLOBIN A1C Q6M  02/17/2018     Visit Vitals    /82 (BP 1 Location: Left arm, BP Patient Position: Sitting)    Pulse 73    Temp 98.8 °F (37.1 °C) (Oral)    Resp 16    Ht 5' 4\" (1.626 m)    Wt 280 lb 9.6 oz (127.3 kg)    LMP 04/01/2018    SpO2 97%    BMI 48.16 kg/m2     Jose Martin Herbert LPN

## 2018-04-27 NOTE — PROGRESS NOTES
Blanca Doherty is a 41 y.o. female who presents to the office today with the following:  Chief Complaint   Patient presents with    Diabetes     pt here for FLW, HM due        HPI   Fasting for labs and to discuss HM due. Pre-DM  Has been controlling with diet. Lab Results   Component Value Date/Time    Hemoglobin A1c 5.8 (H) 08/17/2017 03:05 PM    Hemoglobin A1c (POC) 6.1 04/27/2018 08:50 AM     BP has been doing well on HCTZ and Metoprolol. No new cardiac complaints, ETT pending (pt wanted to hold off on cardiac referral) and recent ER visit all neg work-up in past for SOB/atypical cp- no change in sxs. Does have asthma- using inhaler and neb tx prn. Still taking multiple allergy meds, tessalon prn. Zolmitriptan for migraines. Duexis for arthritis pain. H/o Vit D and B12 def. Has not been checked in sometime. Only taking MV currently. Is tired. Notes conditions stable otherwise, no issues with current regimen. Health Maintenance Due   Topic Date Due    *DM foot exam and micro were added as result of computer generated dx of t2dm. Pt is predm. This was adj in chart today.       EYE EXAM RETINAL OR DILATED Q1  01/23/1991 has made apt    DTaP/Tdap/Td series (1 - Tdap) 01/23/2002 pretty sure is UTD for school    PAP AKA CERVICAL CYTOLOGY  01/23/2002 Dr. Isak Avitia this year    HEMOGLOBIN A1C Q6M  02/17/2018 today     Pt only complaint today is occasional itchy rash between toes that sometimes crack open and hurt. Has tried some otc fungal tx that does help, but stops using and it comes back. No open wounds/ulcers. No n/t/w. Some mild swelling in LEs that resolves with elevation. ROS  See HPI.     Past Medical History:   Diagnosis Date    Anemia NEC     Arthritis     Asthma     Bell's palsy 04/2018    Chronic pain     fibromyalgia    Colitis     GERD (gastroesophageal reflux disease)     Hypertension     diastolic    Ill-defined condition     ACL repair on L leg     Other unknown and unspecified cause of morbidity or mortality     fibromyalgia    Other unknown and unspecified cause of morbidity or mortality     obesity    Pleural effusion associated with pulmonary infection     Pre-diabetes 2018    Psychiatric problem     anxiety    PUD (peptic ulcer disease)     Thyroid disease during pregnancy     hyperthyroid       Past Surgical History:   Procedure Laterality Date     DELIVERY ONLY      x2, one with classical uterine incision    HX  SECTION      x3 total    HX CHOLECYSTECTOMY      HX HEENT      sinus surgery    HX HERNIA REPAIR      HX ORTHOPAEDIC      ACL repair x2 - twice       Allergies   Allergen Reactions    Black Pepper Hives, Swelling and Cough    Mushroom Combination No.1 Shortness of Breath, Swelling and Cough    Tomato Hives, Swelling and Cough    Adhesive Tape-Silicones Hives     Causes burning at site    [employer] Hives    Dicyclomine Rash     Pt says not allergic to dicyclomine    Dilaudid [Hydromorphone (Bulk)] Itching    Doxycycline Hives    Gluten Other (comments)     GI upset    Morphine Rash    Tramadol Rash     Tolerates percocet       Current Outpatient Prescriptions   Medication Sig    hydroCHLOROthiazide (HYDRODIURIL) 25 mg tablet Take 25 mg by mouth daily.  SOD CHLOR,BICARB/SQUEEZ BOTTLE (NASAL RELIEF SINUS WASH-BOTTLE NA) USE AS DIRECTED    benzonatate (TESSALON) 200 mg capsule TAKE ONE CAPSULE BY MOUTH 3 TIMES A DAY AS NEEDED FOR COUGH FOR UP TO 10 DAYS    cetirizine (ZYRTEC) 10 mg tablet TAKE 1 TABLET BY MOUTH DAILY    fluticasone (FLONASE) 50 mcg/actuation nasal spray INHALE 2 SPRAYS EACH NOSTRIL DAILY    ADVAIR DISKUS 500-50 mcg/dose diskus inhaler INHALE CONTENTS OF 1 CAPSULE THROUGH INHALER TWICE DAILY. USE REGULARLY, RINSE MOUTH AFTER EACH USE.     montelukast (SINGULAIR) 10 mg tablet TAKE 1 TABLET BY MOUTH EVERY DAY    medroxyPROGESTERone (PROVERA) 10 mg tablet TAKE 1 TABLET BY MOUTH EVERY DAY FOR 10 DAYS IF NO MENSES AFTER 1&1/2 MONTHS PRN    metoprolol tartrate (LOPRESSOR) 25 mg tablet Take 1 Tab by mouth two (2) times a day.  Dexlansoprazole (DEXILANT) 60 mg CpDB Take  by mouth.  ZOLMitriptan (ZOMIG-ZMT) 5 mg disintegrating tablet Take 1 Tab by mouth as needed for Migraine.  clotrimazole (LOTRIMIN) 1 % topical cream Apply  to affected area two (2) times a day.  magnesium 250 mg tab Take  by mouth.  ipratropium (ATROVENT) 0.02 % nebulizer solution 2.5 mL by Nebulization route as needed for Wheezing.  ibuprofen-famotidine (DUEXIS) 800-26.6 mg tab Take 1 Tab by mouth three (3) times daily as needed.  MULTIVITAMIN PO Take  by mouth. Takes one tab po occasionally.  albuterol (PROVENTIL HFA, VENTOLIN HFA) 90 mcg/actuation inhaler Take 1-2 puffs by inhalation every four (4) hours as needed for Wheezing. No current facility-administered medications for this visit.         Social History     Social History    Marital status:      Spouse name: N/A    Number of children: N/A    Years of education: N/A     Social History Main Topics    Smoking status: Never Smoker    Smokeless tobacco: Never Used    Alcohol use No    Drug use: No    Sexual activity: Yes     Partners: Male     Birth control/ protection: None     Other Topics Concern    None     Social History Narrative       Family History   Problem Relation Age of Onset    Diabetes Father     Heart Attack Father      62 smoker    Hypertension Father     Asthma Father     High Cholesterol Father     Stroke Father     Rashes/Skin Problems Father      eczema    Heart Disease Father     Hypertension Mother     Asthma Mother     Hypertension Maternal Grandmother     Stroke Maternal Grandmother     Heart Disease Maternal Grandfather     Diabetes Paternal [de-identified]     Dementia Paternal Grandfather     Diabetes Brother     Diabetes Sister     Other Sister      ankylosing spondylitis    Hypertension Sister     Other Sister      ankylosing spondylitis    Hypertension Brother     Asthma Daughter     Asthma Son     Asthma Son     Asthma Son     Asthma Son     Asthma Son          Physical Exam:  Visit Vitals    /82 (BP 1 Location: Left arm, BP Patient Position: Sitting)    Pulse 73    Temp 98.8 °F (37.1 °C) (Oral)    Resp 16    Ht 5' 4\" (1.626 m)    Wt 280 lb 9.6 oz (127.3 kg)    LMP 04/01/2018    SpO2 97%    BMI 48.16 kg/m2     Physical Exam   Constitutional: She is oriented to person, place, and time and well-developed, well-nourished, and in no distress. Morbidly obese AAF   HENT:   Head: Normocephalic and atraumatic. Right Ear: External ear normal.   Left Ear: External ear normal.   Nose: Nose normal.   Mouth/Throat: Oropharynx is clear and moist.   With residual left sided face weakness from [place] palsy   Eyes: Conjunctivae and EOM are normal. Pupils are equal, round, and reactive to light. Neck: Normal range of motion. Neck supple. Cardiovascular: Normal rate, regular rhythm, normal heart sounds and intact distal pulses. Pulmonary/Chest: Effort normal and breath sounds normal.   Abdominal: Soft. There is no tenderness. Musculoskeletal: Normal range of motion. She exhibits edema (minimal, non-pitting bilat LEs). Lymphadenopathy:     She has no cervical adenopathy. Neurological: She is alert and oriented to person, place, and time. She has normal motor skills, normal sensation, normal strength and normal reflexes. Gait normal.   Skin: Skin is warm and dry. Psychiatric: Mood and affect normal.   Nursing note and vitals reviewed. Assessment/Plan:    ICD-10-CM ICD-9-CM    1.  Pre-diabetes N71.49 623.63 METABOLIC PANEL, BASIC      LIPID PANEL      AMB POC HEMOGLOBIN A1C      HM DIABETES FOOT EXAM      MICROALBUMIN, UR, RAND W/ MICROALB/CREAT RATIO      OR HANDLG&/OR CONVEY OF SPEC FOR TR OFFICE TO LAB      COLLECTION VENOUS BLOOD,VENIPUNCTURE      URINALYSIS W/ RFLX MICROSCOPIC      CANCELED: REFERRAL TO NUTRITION   2. Obesity, morbid ([facility] [address] E66.01 278.01 LIPID PANEL   3. Gastroesophageal reflux disease without esophagitis K21.9 530.81    4. B12 deficiency E53.8 266.2 VITAMIN B12 & FOLATE   5. Vitamin D deficiency E55.9 268.9 VITAMIN D, 25 HYDROXY   6. Screening for lipid disorders Z13.220 V77.91 LIPID PANEL   7. Thyroid disorder screen Z13.29 V77.0 TSH 3RD GENERATION   8. Tinea pedis of left foot B35.3 110.4      Reiterated LMs. Labs pending. *a few discrepancies adjusted in chart. Pt was once told she had CHF- 8-9 years ago, has since had echo and cardiac eval on several occasions and told did not have this. Today computer generated \"T2DM w. Nephropathy\" from elev A1c and Cr- pt has only been in pre-dm range. Also only had thyroid dz during pregnancy, after delivery labs normalized and has not required tx. This dx was also adjusted in chart today. Discussed with pt. Follow-up Disposition:  Return in about 6 months (around 10/27/2018), or sooner prn or otherwise indicating pending labs.     Pratibha Salomon PA-C

## 2018-04-30 LAB
25(OH)D3+25(OH)D2 SERPL-MCNC: 12.7 NG/ML (ref 30–100)
ALBUMIN/CREAT UR: 3.1 MG/G CREAT (ref 0–30)
APPEARANCE UR: ABNORMAL
BACTERIA #/AREA URNS HPF: ABNORMAL /[HPF]
BILIRUB UR QL STRIP: NEGATIVE
BUN SERPL-MCNC: 14 MG/DL (ref 6–20)
BUN/CREAT SERPL: 14 (ref 9–23)
CALCIUM SERPL-MCNC: 9.6 MG/DL (ref 8.7–10.2)
CASTS URNS QL MICRO: ABNORMAL /LPF
CHLORIDE SERPL-SCNC: 99 MMOL/L (ref 96–106)
CHOLEST SERPL-MCNC: 182 MG/DL (ref 100–199)
CO2 SERPL-SCNC: 29 MMOL/L (ref 18–29)
COLOR UR: YELLOW
CREAT SERPL-MCNC: 1 MG/DL (ref 0.57–1)
CREAT UR-MCNC: 153.1 MG/DL
EPI CELLS #/AREA URNS HPF: >10 /HPF
FOLATE SERPL-MCNC: 13 NG/ML
GFR SERPLBLD CREATININE-BSD FMLA CKD-EPI: 72 ML/MIN/1.73
GFR SERPLBLD CREATININE-BSD FMLA CKD-EPI: 83 ML/MIN/1.73
GLUCOSE SERPL-MCNC: 115 MG/DL (ref 65–99)
GLUCOSE UR QL: NEGATIVE
HDLC SERPL-MCNC: 68 MG/DL
HGB UR QL STRIP: NEGATIVE
INTERPRETATION, 910389: NORMAL
KETONES UR QL STRIP: NEGATIVE
LDLC SERPL CALC-MCNC: 91 MG/DL (ref 0–99)
LEUKOCYTE ESTERASE UR QL STRIP: ABNORMAL
MICRO URNS: ABNORMAL
MICROALBUMIN UR-MCNC: 4.7 UG/ML
MUCOUS THREADS URNS QL MICRO: PRESENT
NITRITE UR QL STRIP: NEGATIVE
PH UR STRIP: 5.5 [PH] (ref 5–7.5)
POTASSIUM SERPL-SCNC: 4 MMOL/L (ref 3.5–5.2)
PROT UR QL STRIP: NEGATIVE
RBC #/AREA URNS HPF: ABNORMAL /HPF
SODIUM SERPL-SCNC: 143 MMOL/L (ref 134–144)
SP GR UR: 1.02 (ref 1–1.03)
SPECIMEN STATUS REPORT, ROLRST: NORMAL
TRIGL SERPL-MCNC: 115 MG/DL (ref 0–149)
TSH SERPL DL<=0.005 MIU/L-ACNC: 1.23 UIU/ML (ref 0.45–4.5)
UROBILINOGEN UR STRIP-MCNC: 0.2 MG/DL (ref 0.2–1)
VIT B12 SERPL-MCNC: 391 PG/ML (ref 232–1245)
VLDLC SERPL CALC-MCNC: 23 MG/DL (ref 5–40)
WBC #/AREA URNS HPF: ABNORMAL /HPF

## 2018-04-30 RX ORDER — ERGOCALCIFEROL 1.25 MG/1
50000 CAPSULE ORAL
Qty: 12 CAP | Refills: 0 | Status: SHIPPED | OUTPATIENT
Start: 2018-04-30 | End: 2018-07-24 | Stop reason: SDUPTHER

## 2018-04-30 NOTE — PROGRESS NOTES
Notify pt Vit D is low, but other labs are unremarkable. I am sending a weekly Rx for her to start taking Vit D Supp qwk. Repeat in 3 mo.

## 2018-07-24 DIAGNOSIS — E55.9 VITAMIN D DEFICIENCY: ICD-10-CM

## 2018-07-24 RX ORDER — ERGOCALCIFEROL 1.25 MG/1
CAPSULE ORAL
Qty: 12 CAP | Refills: 0 | Status: SHIPPED | OUTPATIENT
Start: 2018-07-24 | End: 2019-05-06

## 2018-08-23 DIAGNOSIS — I10 ESSENTIAL HYPERTENSION: ICD-10-CM

## 2018-08-24 ENCOUNTER — OFFICE VISIT (OUTPATIENT)
Dept: FAMILY MEDICINE CLINIC | Age: 38
End: 2018-08-24

## 2018-08-24 VITALS
BODY MASS INDEX: 48.69 KG/M2 | DIASTOLIC BLOOD PRESSURE: 85 MMHG | WEIGHT: 285.2 LBS | SYSTOLIC BLOOD PRESSURE: 128 MMHG | RESPIRATION RATE: 16 BRPM | HEART RATE: 75 BPM | TEMPERATURE: 98.5 F | HEIGHT: 64 IN

## 2018-08-24 VITALS
DIASTOLIC BLOOD PRESSURE: 85 MMHG | TEMPERATURE: 98.5 F | OXYGEN SATURATION: 98 % | BODY MASS INDEX: 48.69 KG/M2 | WEIGHT: 285.2 LBS | HEART RATE: 75 BPM | RESPIRATION RATE: 16 BRPM | HEIGHT: 64 IN | SYSTOLIC BLOOD PRESSURE: 128 MMHG

## 2018-08-24 DIAGNOSIS — R07.9 CHEST PAIN, UNSPECIFIED TYPE: Primary | ICD-10-CM

## 2018-08-24 DIAGNOSIS — M54.30 BACK PAIN WITH SCIATICA: Primary | ICD-10-CM

## 2018-08-24 DIAGNOSIS — M54.9 BACK PAIN WITH SCIATICA: Primary | ICD-10-CM

## 2018-08-24 RX ORDER — AMITRIPTYLINE HYDROCHLORIDE 10 MG/1
TABLET, FILM COATED ORAL
COMMUNITY
Start: 2018-08-20 | End: 2018-10-11

## 2018-08-24 RX ORDER — HYDROCHLOROTHIAZIDE 25 MG/1
TABLET ORAL
Qty: 30 TAB | Refills: 1 | Status: SHIPPED | OUTPATIENT
Start: 2018-08-24 | End: 2018-11-03 | Stop reason: SDUPTHER

## 2018-08-24 RX ORDER — KETOROLAC TROMETHAMINE 30 MG/ML
30 INJECTION, SOLUTION INTRAMUSCULAR; INTRAVENOUS ONCE
Qty: 1 VIAL | Refills: 0
Start: 2018-08-24 | End: 2018-08-24

## 2018-08-24 RX ORDER — METHYLPREDNISOLONE 4 MG/1
TABLET ORAL
Qty: 1 DOSE PACK | Refills: 0 | Status: SHIPPED | OUTPATIENT
Start: 2018-08-24 | End: 2018-10-01 | Stop reason: ALTCHOICE

## 2018-08-24 NOTE — PROGRESS NOTES
Pt was fitted in last min on schedule today, she had stated her back/leg were the only complaints. Upon finishing visit pt admits to some new chest pain when lying flat only, past few days. Had ETT in April. Rec she wait to be further eval by Dr. Malachi Poe today for this other complaint, as I do not want her to leave w/o further eval and am unable to eval further today due to limited scheduling.

## 2018-08-24 NOTE — PROGRESS NOTES
Genoveva Zepeda is a 41 y.o. female who presents to the office today with the following:  Chief Complaint   Patient presents with    Leg Pain     right       HPI  Woke up with right leg pain several days ago. Starts in right lower back and shoots down back of leg to foot. Both feet have been swollen, not related to leg pain, no other swelling, no n/t/w. No bowel or bladder problems, except diarrhea from recent abx Dr. Abena Grayson prescribed. No injuries. Has had sciatica pain in the past, not usually this bad. CS have worked well before. OTC currently providing no relief. Review of Systems   Constitutional: Negative for chills, fever and malaise/fatigue. Gastrointestinal: Negative. Genitourinary: Negative. Musculoskeletal: Positive for back pain. Negative for falls and myalgias. Skin: Negative for rash. Neurological: Negative for tingling, sensory change, speech change and focal weakness. See HPI.     Past Medical History:   Diagnosis Date    Anemia NEC     Arthritis     Asthma     Bell's palsy 2018    Chronic pain     fibromyalgia    Colitis     GERD (gastroesophageal reflux disease)     Hypertension     diastolic    Ill-defined condition     ACL repair on L leg     Other unknown and unspecified cause of morbidity or mortality     fibromyalgia    Other unknown and unspecified cause of morbidity or mortality     obesity    Pleural effusion associated with pulmonary infection     Pre-diabetes 2018    Psychiatric problem     anxiety    PUD (peptic ulcer disease)     Thyroid disease during pregnancy     hyperthyroid       Past Surgical History:   Procedure Laterality Date     DELIVERY ONLY      x2, one with classical uterine incision    HX  SECTION      x3 total    HX CHOLECYSTECTOMY      HX HEENT      sinus surgery    HX HERNIA REPAIR      HX ORTHOPAEDIC      ACL repair x2 - twice       Allergies   Allergen Reactions    Black Pepper Hives, Swelling and Cough    Mushroom Combination No.1 Shortness of Breath, Swelling and Cough    Tomato Hives, Swelling and Cough    Adhesive Tape-Silicones Hives     Causes burning at site    [employer] Hives    Dicyclomine Rash     Pt says not allergic to dicyclomine    Dilaudid [Hydromorphone (Bulk)] Itching    Doxycycline Hives    Gluten Other (comments)     GI upset    Morphine Rash    Tramadol Rash     Tolerates percocet       Current Outpatient Prescriptions   Medication Sig    methylPREDNISolone (MEDROL DOSEPACK) 4 mg tablet Take as directed in dose pack.  ergocalciferol (ERGOCALCIFEROL) 50,000 unit capsule TAKE 1 CAPSULE BY MOUTH EVERY 7 DAYS.  hydroCHLOROthiazide (HYDRODIURIL) 25 mg tablet Take 25 mg by mouth daily.  SOD CHLOR,BICARB/SQUEEZ BOTTLE (NASAL RELIEF SINUS WASH-BOTTLE NA) USE AS DIRECTED    benzonatate (TESSALON) 200 mg capsule TAKE ONE CAPSULE BY MOUTH 3 TIMES A DAY AS NEEDED FOR COUGH FOR UP TO 10 DAYS    cetirizine (ZYRTEC) 10 mg tablet TAKE 1 TABLET BY MOUTH DAILY    fluticasone (FLONASE) 50 mcg/actuation nasal spray INHALE 2 SPRAYS EACH NOSTRIL DAILY    ADVAIR DISKUS 500-50 mcg/dose diskus inhaler INHALE CONTENTS OF 1 CAPSULE THROUGH INHALER TWICE DAILY. USE REGULARLY, RINSE MOUTH AFTER EACH USE.  montelukast (SINGULAIR) 10 mg tablet TAKE 1 TABLET BY MOUTH EVERY DAY    medroxyPROGESTERone (PROVERA) 10 mg tablet TAKE 1 TABLET BY MOUTH EVERY DAY FOR 10 DAYS IF NO MENSES AFTER 1&1/2 MONTHS PRN    metoprolol tartrate (LOPRESSOR) 25 mg tablet Take 1 Tab by mouth two (2) times a day.  Dexlansoprazole (DEXILANT) 60 mg CpDB Take  by mouth.  ZOLMitriptan (ZOMIG-ZMT) 5 mg disintegrating tablet Take 1 Tab by mouth as needed for Migraine.  clotrimazole (LOTRIMIN) 1 % topical cream Apply  to affected area two (2) times a day.  magnesium 250 mg tab Take  by mouth.     ipratropium (ATROVENT) 0.02 % nebulizer solution 2.5 mL by Nebulization route as needed for Wheezing.  ibuprofen-famotidine (DUEXIS) 800-26.6 mg tab Take 1 Tab by mouth three (3) times daily as needed.  MULTIVITAMIN PO Take  by mouth. Takes one tab po occasionally.  albuterol (PROVENTIL HFA, VENTOLIN HFA) 90 mcg/actuation inhaler Take 1-2 puffs by inhalation every four (4) hours as needed for Wheezing.  amitriptyline (ELAVIL) 10 mg tablet      No current facility-administered medications for this visit.         Social History     Social History    Marital status:      Spouse name: N/A    Number of children: N/A    Years of education: N/A     Social History Main Topics    Smoking status: Never Smoker    Smokeless tobacco: Never Used    Alcohol use No    Drug use: No    Sexual activity: Yes     Partners: Male     Birth control/ protection: None     Other Topics Concern    None     Social History Narrative       Family History   Problem Relation Age of Onset    Diabetes Father     Heart Attack Father      62 smoker    Hypertension Father     Asthma Father     High Cholesterol Father     Stroke Father     Rashes/Skin Problems Father      eczema    Heart Disease Father     Hypertension Mother     Asthma Mother     Hypertension Maternal Grandmother     Stroke Maternal Grandmother     Heart Disease Maternal Grandfather     Diabetes Paternal Grandmother     Dementia Paternal Grandfather     Diabetes Brother     Diabetes Sister     Other Sister      ankylosing spondylitis    Hypertension Sister     Other Sister      ankylosing spondylitis    Hypertension Brother     Asthma Daughter    Andrez Miners Asthma Son     Asthma Son     Asthma Son     Asthma Son     Asthma Son          Physical Exam:  Visit Vitals    /85 (BP 1 Location: Left arm, BP Patient Position: Sitting)    Pulse 75    Temp 98.5 °F (36.9 °C) (Oral)    Resp 16    Ht 5' 4\" (1.626 m)    Wt 285 lb 3.2 oz (129.4 kg)    LMP 08/03/2018    BMI 48.95 kg/m2     Physical Exam   Constitutional: She is oriented to person, place, and time and well-developed, well-nourished, and in no distress. HENT:   Head: Normocephalic and atraumatic. Eyes: Conjunctivae are normal.   Neck: Normal range of motion. Neck supple. Cardiovascular: Normal rate and regular rhythm. Pulmonary/Chest: Effort normal and breath sounds normal.   Musculoskeletal:        Lumbar back: She exhibits decreased range of motion (somehwat limited in flexion, but pt notes pain reproduced in ext and rotation), tenderness (lumbar midline and diffuse right paraspinal mm and gluts) and pain. She exhibits no swelling, no edema, no deformity, no laceration, no spasm and normal pulse. Back:    Gross examination of LE unremarkable. CSM intact. Lymphadenopathy:     She has no cervical adenopathy. Neurological: She is alert and oriented to person, place, and time. She has normal sensation, normal strength and normal reflexes. No cranial nerve deficit. She has an abnormal Straight Leg Raise Test (pain with R SLR). Gait (antalgic) abnormal.   Skin: Skin is warm, dry and intact. No erythema. No LE swelling/warmth/redness appreciated. Psychiatric: Mood and affect normal.   Nursing note and vitals reviewed. Assessment/Plan:    ICD-10-CM ICD-9-CM    1. Back pain with sciatica M54.30 724.3 methylPREDNISolone (MEDROL DOSEPACK) 4 mg tablet    M54.9     counseled on light ROM/stretching exercises. Counseled pt on potential medication AEs/interactions. HO provided. RTC if sxs persist/worsen. To seek immediate med attn for any severe/new sxs. Follow-up Disposition:  Return if symptoms worsen or fail to improve.     Norma Jon PA-C

## 2018-08-24 NOTE — MR AVS SNAPSHOT
[facility] 
[phone] Patient: Jaycee Brady MRN: [id number] EOJ:[birth year] Visit Information Date & Time Provider Department Dept. Phone Encounter #  
 8/24/2018  1:40 PM Luis Barber MD [address] [place] [address] [phone] [id number] Follow-up Instructions Return if symptoms worsen or fail to improve. Follow-up and Disposition History Upcoming Health Maintenance Date Due  
 EYE EXAM RETINAL OR DILATED Q1 1/23/1991 DTaP/Tdap/Td series (1 - Tdap) 1/23/2002 PAP AKA CERVICAL CYTOLOGY 1/23/2002 Influenza Age 5 to Adult 8/1/2018 HEMOGLOBIN A1C Q6M 10/27/2018 FOOT EXAM Q1 4/27/2019 MICROALBUMIN Q1 4/27/2019 LIPID PANEL Q1 4/27/2019 Allergies as of 8/24/2018  Review Complete On: 8/24/2018 By: Talon Garcia LPN Severity Noted Reaction Type Reactions Black Pepper Medium 09/15/2014    Hives, Swelling, Cough Mushroom Combination No.1 Medium 09/15/2014    Shortness of Breath, Swelling, Cough Tomato Medium 09/15/2014    Hives, Swelling, Cough Adhesive Tape-silicones  83/22/4853   Topical Hives Causes burning at site Beef Containing Products  02/28/2018    Hives Dicyclomine  07/27/2017    Rash Pt says not allergic to dicyclomine Dilaudid [Hydromorphone (Bulk)]  09/16/2014    Itching Doxycycline  04/15/2018    Hives Gluten Low 06/13/2013    Other (comments) GI upset Morphine Low 01/17/2011   Side Effect Rash Tramadol Low 01/17/2011   Side Effect Rash Tolerates percocet Current Immunizations  Reviewed on 9/22/2014 Name Date Influenza Vaccine 1/8/2018 Influenza Vaccine PF 10/1/2014  6:06 PM  
 Pneumococcal Polysaccharide (PPSV-23) 10/1/2014  6:08 PM  
 TB Skin Test (PPD) Intradermal 1/2/2018 Not reviewed this visit You Were Diagnosed With   
  
 Codes Comments Chest pain, unspecified type    -  Primary ICD-10-CM: R07.9 ICD-9-CM: 786.50 Vitals BP Pulse Temp Resp Height(growth percentile) Weight(growth percentile) 128/85 (BP 1 Location: Left arm, BP Patient Position: Sitting) 75 98.5 °F (36.9 °C) (Oral) 16 5' 4\" (1.626 m) 285 lb 3.2 oz (129.4 kg) LMP SpO2 BMI OB Status Smoking Status 08/03/2018 98% 48.95 kg/m2 Unknown Never Smoker Vitals History BMI and BSA Data Body Mass Index Body Surface Area 48.95 kg/m 2 2.42 m 2 Preferred Pharmacy Pharmacy Name Phone [id number] [address] [phone] Your Updated Medication List  
  
   
This list is accurate as of 8/24/18  3:32 PM.  Always use your most recent med list.  
  
  
  
  
 ADVAIR DISKUS 500-50 mcg/dose diskus inhaler Generic drug:  fluticasone-salmeterol INHALE CONTENTS OF 1 CAPSULE THROUGH INHALER TWICE DAILY. USE REGULARLY, RINSE MOUTH AFTER EACH USE.  
  
 albuterol 90 mcg/actuation inhaler Commonly known as:  PROVENTIL HFA, VENTOLIN HFA, PROAIR HFA Take 1-2 puffs by inhalation every four (4) hours as needed for Wheezing. amitriptyline 10 mg tablet Commonly known as:  ELAVIL  
  
 benzonatate 200 mg capsule Commonly known as:  TESSALON  
TAKE ONE CAPSULE BY MOUTH 3 TIMES A DAY AS NEEDED FOR COUGH FOR UP TO 10 DAYS  
  
 cetirizine 10 mg tablet Commonly known as:  ZYRTEC  
TAKE 1 TABLET BY MOUTH DAILY  
  
 clotrimazole 1 % topical cream  
Commonly known as:  Elenore Awkward Apply  to affected area two (2) times a day. DEXILANT 60 mg Cpdb Generic drug:  Dexlansoprazole Take  by mouth. DUEXIS 800-26.6 mg Tab Generic drug:  ibuprofen-famotidine Take 1 Tab by mouth three (3) times daily as needed. ergocalciferol 50,000 unit capsule Commonly known as:  ERGOCALCIFEROL  
TAKE 1 CAPSULE BY MOUTH EVERY 7 DAYS. fluticasone 50 mcg/actuation nasal spray Commonly known as:  [employer] INHALE 2 SPRAYS EACH NOSTRIL DAILY  
  
 hydroCHLOROthiazide 25 mg tablet Commonly known as:  HYDRODIURIL Take 25 mg by mouth daily. ipratropium 0.02 % Soln Commonly known as:  ATROVENT  
2.5 mL by Nebulization route as needed for Wheezing.  
  
 ketorolac 30 mg/mL (1 mL) injection Commonly known as:  TORADOL  
1 mL by IntraVENous route once for 1 dose.  
  
 magnesium 250 mg Tab Take  by mouth.  
  
 medroxyPROGESTERone 10 mg tablet Commonly known as:  PROVERA TAKE 1 TABLET BY MOUTH EVERY DAY FOR 10 DAYS IF NO MENSES AFTER 1&1/2 MONTHS PRN  
  
 methylPREDNISolone 4 mg tablet Commonly known as:  Kennth Faes Take as directed in dose pack. metoprolol tartrate 25 mg tablet Commonly known as:  LOPRESSOR Take 1 Tab by mouth two (2) times a day. montelukast 10 mg tablet Commonly known as:  SINGULAIR  
TAKE 1 TABLET BY MOUTH EVERY DAY  
  
 MULTIVITAMIN PO Take  by mouth. Takes one tab po occasionally. NASAL RELIEF SINUS WASH-BOTTLE NA  
USE AS DIRECTED  
  
 ZOLMitriptan 5 mg disintegrating tablet Commonly known as:  ZOMIG-ZMT Take 1 Tab by mouth as needed for Migraine. We Performed the Following AMB POC EKG ROUTINE W/ 12 LEADS, INTER & REP [05435 CPT(R)] Follow-up Instructions Return if symptoms worsen or fail to improve. Introducing [facility]! Dear John Pastor: Thank you for requesting a [employer] account. Our records indicate that you already have an active [employer] account. You can access your account anytime at [email or web address] Did you know that you can access your hospital and ER discharge instructions at any time in [employer]? You can also review all of your test results from your hospital stay or ER visit. Additional Information If you have questions, please visit the Frequently Asked Questions section of the [employer] website at [email or web address] Remember, [employer] is NOT to be used for urgent needs. For medical emergencies, dial 911. Now available from your iPhone and Android! Please provide this summary of care documentation to your next provider. Your primary care clinician is listed as Yaquelin García. If you have any questions after today's visit, please call [phone].

## 2018-08-24 NOTE — PATIENT INSTRUCTIONS
Sciatica: Exercises  Your Care Instructions  Here are some examples of typical rehabilitation exercises for your condition. Start each exercise slowly. Ease off the exercise if you start to have pain. Your doctor or physical therapist will tell you when you can start these exercises and which ones will work best for you. When you are not being active, find a comfortable position for rest. Some people are comfortable on the floor or a medium-firm bed with a small pillow under their head and another under their knees. Some people prefer to lie on their side with a pillow between their knees. Don't stay in one position for too long. Take short walks (10 to 20 minutes) every 2 to 3 hours. Avoid slopes, hills, and stairs until you feel better. Walk only distances you can manage without pain, especially leg pain. How to do the exercises  Back stretches    1. Get down on your hands and knees on the floor. 2. Relax your head and allow it to droop. Round your back up toward the ceiling until you feel a nice stretch in your upper, middle, and lower back. Hold this stretch for as long as it feels comfortable, or about 15 to 30 seconds. 3. Return to the starting position with a flat back while you are on your hands and knees. 4. Let your back sway by pressing your stomach toward the floor. Lift your buttocks toward the ceiling. 5. Hold this position for 15 to 30 seconds. 6. Repeat 2 to 4 times. Follow-up care is a key part of your treatment and safety. Be sure to make and go to all appointments, and call your doctor if you are having problems. It's also a good idea to know your test results and keep a list of the medicines you take. Where can you learn more? Go to http://moe-antonio.info/. Enter P590 in the search box to learn more about \"Sciatica: Exercises. \"  Current as of: November 29, 2017  Content Version: 11.7  © 1009-8211 [employer].  Care instructions adapted under license by [address] (which disclaims liability or warranty for this information). If you have questions about a medical condition or this instruction, always ask your healthcare professional. [address] any warranty or liability for your use of this information. Sciatica: Care Instructions  Your Care Instructions    Sciatica (say \"bud-MW-zl-kuh\") is an irritation of one of the sciatic nerves, which come from the spinal cord in the lower back. The sciatic nerves and their branches extend down through the buttock to the foot. Sciatica can develop when an injured disc in the back presses against a spinal nerve root. Its main symptom is pain, numbness, or weakness that is often worse in the leg or foot than in the back. Sciatica often will improve and go away with time. Early treatment usually includes medicines and exercises to relieve pain. Follow-up care is a key part of your treatment and safety. Be sure to make and go to all appointments, and call your doctor if you are having problems. It's also a good idea to know your test results and keep a list of the medicines you take. How can you care for yourself at home? · Take pain medicines exactly as directed. ¨ If the doctor gave you a prescription medicine for pain, take it as prescribed. ¨ If you are not taking a prescription pain medicine, ask your doctor if you can take an over-the-counter medicine. · Use heat or ice to relieve pain. ¨ To apply heat, put a warm water bottle, heating pad set on low, or warm cloth on your back. Do not go to sleep with a heating pad on your skin. ¨ To use ice, put ice or a cold pack on the area for 10 to 20 minutes at a time. Put a thin cloth between the ice and your skin. · Avoid sitting if possible, unless it feels better than standing. · Alternate lying down with short walks. Increase your walking distance as you are able to without making your symptoms worse.   · Do not do anything that makes your symptoms worse. When should you call for help? Call 911 anytime you think you may need emergency care. For example, call if:    · You are unable to move a leg at all.   [facility] your doctor now or seek immediate medical care if:    · You have new or worse symptoms in your legs or buttocks. Symptoms may include:  ¨ Numbness or tingling. ¨ Weakness. ¨ Pain.     · You lose bladder or bowel control.    Watch closely for changes in your health, and be sure to contact your doctor if:    · You are not getting better as expected. Where can you learn more? Go to http://moe-antonio.info/. Enter [phone] in the search box to learn more about \"Sciatica: Care Instructions. \"  Current as of: November 29, 2017  Content Version: 11.7  © 1098-4064 [employer]. Care instructions adapted under license by [employer] (which disclaims liability or warranty for this information). If you have questions about a medical condition or this instruction, always ask your healthcare professional. [address] any warranty or liability for your use of this information.

## 2018-08-24 NOTE — PROGRESS NOTES
HISTORY OF PRESENT ILLNESS  Aysha Aggarwal is a 41 y.o. female. Chief Complaint   Patient presents with    Chest Pain     when laying down, comes with some SOB, center of chest and feels like ribs are closing in       HPI  Chest pain when lying down since Wed, 3 d  Pain is squeezing  Chest wall pushing in  8-9/10  Tender with palpation  Tried Tylenol    Was On 2 ABX for infection in uterus  Flagyl and Amoxil, finished them  D/t pain before and during period  Saw gyn Dr Neal Agudelo  Had vaginal US    Taking Dexilant for GERD and Hiatal Hernia    Taking Duexis      Review of Systems   Constitutional: Negative for diaphoresis. Eyes: Negative. Eye discharge: floaters. Respiratory: Positive for cough (little at night), shortness of breath and wheezing (sometimes). Cardiovascular: Positive for chest pain and palpitations (sometimes). Gastrointestinal: Positive for abdominal pain (sore in upper abdomen), diarrhea (with ABX), heartburn and nausea. Negative for vomiting. Neurological: Positive for headaches. Negative for dizziness.      Past Medical History:   Diagnosis Date    Anemia NEC     Arthritis     Asthma     Bell's palsy 2018    Chronic pain     fibromyalgia    Colitis     GERD (gastroesophageal reflux disease)     Hypertension     diastolic    Ill-defined condition     ACL repair on L leg     Other unknown and unspecified cause of morbidity or mortality     fibromyalgia    Other unknown and unspecified cause of morbidity or mortality     obesity    Pleural effusion associated with pulmonary infection     Pre-diabetes 2018    Psychiatric problem     anxiety    PUD (peptic ulcer disease)     Thyroid disease during pregnancy     hyperthyroid       Past Surgical History:   Procedure Laterality Date     DELIVERY ONLY      x2, one with classical uterine incision    HX  SECTION      x3 total    HX CHOLECYSTECTOMY      HX HEENT      sinus surgery    HX HERNIA REPAIR  HX ORTHOPAEDIC      ACL repair x2 - twice       Current Outpatient Prescriptions   Medication Sig Dispense Refill    amitriptyline (ELAVIL) 10 mg tablet       methylPREDNISolone (MEDROL DOSEPACK) 4 mg tablet Take as directed in dose pack. 1 Dose Pack 0    ketorolac (TORADOL) 30 mg/mL (1 mL) injection 1 mL by IntraVENous route once for 1 dose. 1 Vial 0    ergocalciferol (ERGOCALCIFEROL) 50,000 unit capsule TAKE 1 CAPSULE BY MOUTH EVERY 7 DAYS. 12 Cap 0    hydroCHLOROthiazide (HYDRODIURIL) 25 mg tablet Take 25 mg by mouth daily.  SOD CHLOR,BICARB/SQUEEZ BOTTLE (NASAL RELIEF SINUS WASH-BOTTLE NA) USE AS DIRECTED  1    benzonatate (TESSALON) 200 mg capsule TAKE ONE CAPSULE BY MOUTH 3 TIMES A DAY AS NEEDED FOR COUGH FOR UP TO 10 DAYS  0    cetirizine (ZYRTEC) 10 mg tablet TAKE 1 TABLET BY MOUTH DAILY  5    fluticasone (FLONASE) 50 mcg/actuation nasal spray INHALE 2 SPRAYS EACH NOSTRIL DAILY  5    ADVAIR DISKUS 500-50 mcg/dose diskus inhaler INHALE CONTENTS OF 1 CAPSULE THROUGH INHALER TWICE DAILY. USE REGULARLY, RINSE MOUTH AFTER EACH USE.  2    montelukast (SINGULAIR) 10 mg tablet TAKE 1 TABLET BY MOUTH EVERY DAY  5    medroxyPROGESTERone (PROVERA) 10 mg tablet TAKE 1 TABLET BY MOUTH EVERY DAY FOR 10 DAYS IF NO MENSES AFTER 1&1/2 MONTHS PRN  12    metoprolol tartrate (LOPRESSOR) 25 mg tablet Take 1 Tab by mouth two (2) times a day. 30 Tab 2    Dexlansoprazole (DEXILANT) 60 mg CpDB Take  by mouth.  ZOLMitriptan (ZOMIG-ZMT) 5 mg disintegrating tablet Take 1 Tab by mouth as needed for Migraine. 12 Tab 11    clotrimazole (LOTRIMIN) 1 % topical cream Apply  to affected area two (2) times a day. 15 g 0    magnesium 250 mg tab Take  by mouth.  ipratropium (ATROVENT) 0.02 % nebulizer solution 2.5 mL by Nebulization route as needed for Wheezing. 2.5 mL 11    ibuprofen-famotidine (DUEXIS) 800-26.6 mg tab Take 1 Tab by mouth three (3) times daily as needed.  MULTIVITAMIN PO Take  by mouth. Takes one tab po occasionally.  albuterol (PROVENTIL HFA, VENTOLIN HFA) 90 mcg/actuation inhaler Take 1-2 puffs by inhalation every four (4) hours as needed for Wheezing. Allergies   Allergen Reactions    Black Pepper Hives, Swelling and Cough    Mushroom Combination No.1 Shortness of Breath, Swelling and Cough    Tomato Hives, Swelling and Cough    Adhesive Tape-Silicones Hives     Causes burning at site    [employer] Hives    Dicyclomine Rash     Pt says not allergic to dicyclomine    Dilaudid [Hydromorphone (Bulk)] Itching    Doxycycline Hives    Gluten Other (comments)     GI upset    Morphine Rash    Tramadol Rash     Tolerates percocet       Visit Vitals    /85 (BP 1 Location: Left arm, BP Patient Position: Sitting)    Pulse 75    Temp 98.5 °F (36.9 °C) (Oral)    Resp 16    Ht 5' 4\" (1.626 m)    Wt 285 lb 3.2 oz (129.4 kg)    LMP 08/03/2018    SpO2 98%    BMI 48.95 kg/m2     Physical Exam   Constitutional: She is oriented to person, place, and time. She appears well-developed and well-nourished. No distress. HENT:   Head: Normocephalic and atraumatic. Mouth/Throat: Oropharynx is clear and moist. No oropharyngeal exudate. Eyes: Conjunctivae and EOM are normal. Pupils are equal, round, and reactive to light. Cardiovascular: Normal rate, regular rhythm and normal heart sounds. Pulmonary/Chest: Effort normal and breath sounds normal. She exhibits tenderness (over ant ches twall). Abdominal: Soft. There is tenderness (epigastric). Lymphadenopathy:     She has no cervical adenopathy. Neurological: She is alert and oriented to person, place, and time. Skin: Skin is warm and dry. Psychiatric:   tearful   Nursing note and vitals reviewed. GI cocktail given and helping a little  But still some chest wall tenderness    EKG wnl    ASSESSMENT and PLAN    ICD-10-CM ICD-9-CM    1.  Chest pain, unspecified type R07.9 786.50 AMB POC EKG ROUTINE W/ 12 LEADS, INTER & REP      ketorolac (TORADOL) 30 mg/mL (1 mL) injection   likely Costochondritis and GERD  Cont Dexilant and Duexis

## 2018-10-10 ENCOUNTER — TELEPHONE (OUTPATIENT)
Dept: FAMILY MEDICINE CLINIC | Age: 38
End: 2018-10-10

## 2018-10-10 DIAGNOSIS — E03.4 HYPOTHYROIDISM DUE TO ACQUIRED ATROPHY OF THYROID: ICD-10-CM

## 2018-10-10 DIAGNOSIS — M26.609 TMJ (TEMPOROMANDIBULAR JOINT SYNDROME): ICD-10-CM

## 2018-10-10 DIAGNOSIS — G43.109 MIGRAINE WITH AURA AND WITHOUT STATUS MIGRAINOSUS, NOT INTRACTABLE: ICD-10-CM

## 2018-10-10 DIAGNOSIS — G25.81 RESTLESS LEG SYNDROME: ICD-10-CM

## 2018-10-10 DIAGNOSIS — E11.42 DIABETIC PERIPHERAL NEUROPATHY ASSOCIATED WITH TYPE 2 DIABETES MELLITUS (HCC): ICD-10-CM

## 2018-10-10 DIAGNOSIS — G44.209 TENSION VASCULAR HEADACHE: ICD-10-CM

## 2018-10-10 DIAGNOSIS — E55.9 VITAMIN D DEFICIENCY: ICD-10-CM

## 2018-10-10 DIAGNOSIS — G56.03 BILATERAL CARPAL TUNNEL SYNDROME: ICD-10-CM

## 2018-10-10 DIAGNOSIS — E53.8 B12 DEFICIENCY: ICD-10-CM

## 2018-10-10 DIAGNOSIS — G60.8 IDIOPATHIC SMALL AND LARGE FIBER SENSORY NEUROPATHY: ICD-10-CM

## 2018-10-10 RX ORDER — ZOLMITRIPTAN 5 MG/1
5 TABLET, ORALLY DISINTEGRATING ORAL AS NEEDED
Qty: 12 TAB | Refills: 0 | Status: SHIPPED | OUTPATIENT
Start: 2018-10-10 | End: 2020-03-25 | Stop reason: ALTCHOICE

## 2018-10-10 NOTE — TELEPHONE ENCOUNTER
Spoke w/pt.  She was seen in ER at [facility] yesterday for SOB and chest pain; per pt., discharge disposition was enlargement of thyroid, pt was directed to be seen by PCP today, however there are no appointments available; pt would like Alton Villarreal to call her for advisement

## 2018-10-10 NOTE — TELEPHONE ENCOUNTER
Patient was in [facility] ER and was told to see PCP today however Georgia Caldwell is booked as well as Dr. Isidoro Hassan.   Please call patient at [phone]

## 2018-10-10 NOTE — TELEPHONE ENCOUNTER
Future Appointments  Date Time Provider Mahesh Huerta   10/10/2018 1:30 PM Dale Katz PA-C [facility]   10/11/2018 11:20 AM Eduardo Nettles MD [address] [place] [address]                         Last Appointment My Department:  10/25/17    Please advise of refill below. Requested Prescriptions     Pending Prescriptions Disp Refills    ZOLMitriptan (ZOMIG-ZMT) 5 mg disintegrating tablet 12 Tab 0     Sig: Take 1 Tab by mouth as needed for Migraine.  PATIENT NEEDS TO CALL FOR  APPOINTMENT FOR FURTHER REFILLS

## 2018-10-10 NOTE — TELEPHONE ENCOUNTER
From: Igor Smith  To: Thuy Mann MD  Sent: 10/10/2018 2:19 AM EDT  Subject: Medication Renewal Request    Original authorizing provider: MD Marina Marin would like a refill of the following medications:  ZOLMitriptan (ZOMIG-ZMT) 5 mg disintegrating tablet Thuy Mann MD]    Preferred pharmacy: [id number] [address]    Comment:

## 2018-10-11 ENCOUNTER — OFFICE VISIT (OUTPATIENT)
Dept: CARDIOLOGY CLINIC | Age: 38
End: 2018-10-11

## 2018-10-11 VITALS
HEART RATE: 73 BPM | OXYGEN SATURATION: 99 % | RESPIRATION RATE: 18 BRPM | WEIGHT: 289 LBS | HEIGHT: 64 IN | DIASTOLIC BLOOD PRESSURE: 92 MMHG | SYSTOLIC BLOOD PRESSURE: 120 MMHG | BODY MASS INDEX: 49.34 KG/M2

## 2018-10-11 DIAGNOSIS — R07.89 ATYPICAL CHEST PAIN: ICD-10-CM

## 2018-10-11 DIAGNOSIS — M19.90 ARTHRITIS: ICD-10-CM

## 2018-10-11 DIAGNOSIS — E66.01 OBESITY, MORBID (HCC): ICD-10-CM

## 2018-10-11 DIAGNOSIS — R00.2 PALPITATIONS: ICD-10-CM

## 2018-10-11 DIAGNOSIS — J45.909 ASTHMA, INTRINSIC: ICD-10-CM

## 2018-10-11 DIAGNOSIS — E11.21 TYPE 2 DIABETES WITH NEPHROPATHY (HCC): ICD-10-CM

## 2018-10-11 DIAGNOSIS — E78.5 DYSLIPIDEMIA: ICD-10-CM

## 2018-10-11 DIAGNOSIS — I10 ESSENTIAL HYPERTENSION: ICD-10-CM

## 2018-10-11 DIAGNOSIS — K21.9 GASTROESOPHAGEAL REFLUX DISEASE WITHOUT ESOPHAGITIS: ICD-10-CM

## 2018-10-11 DIAGNOSIS — K27.9 PUD (PEPTIC ULCER DISEASE): ICD-10-CM

## 2018-10-11 DIAGNOSIS — R06.09 DOE (DYSPNEA ON EXERTION): Primary | ICD-10-CM

## 2018-10-11 NOTE — MR AVS SNAPSHOT
[address] 
 
 
 [address] [id number] [phone] Patient: [address] MRN: [id number] Visit Information Date & Time Provider Department Dept. Phone Encounter #  
 10/11/2018 11:20 AM Deena Benjamin MD [facility] [phone] [id number] Your Appointments 10/16/2018  4:15 PM  
PROCEDURE with [facility] ([address]) Appt Note: 24 hr holter per hawk $cp  
 [address] [facility] [phone]  
  
   
 [address] [place] [address] Upcoming Health Maintenance Date Due  
 EYE EXAM RETINAL OR DILATED Q1 1/23/1991 DTaP/Tdap/Td series (1 - Tdap) 1/23/2002 PAP AKA CERVICAL CYTOLOGY 1/23/2002 Influenza Age 5 to Adult 8/1/2018 HEMOGLOBIN A1C Q6M 4/1/2019 FOOT EXAM Q1 4/27/2019 MICROALBUMIN Q1 4/27/2019 LIPID PANEL Q1 4/27/2019 Allergies as of 10/11/2018  Review Complete On: 10/11/2018 By: Isak Cuenca LPN Severity Noted Reaction Type Reactions Black Pepper Medium 09/15/2014    Hives, Swelling, Cough Mushroom Combination No.1 Medium 09/15/2014    Shortness of Breath, Swelling, Cough Tomato Medium 09/15/2014    Hives, Swelling, Cough Adhesive Tape-silicones  28/60/0353   Topical Hives Causes burning at site Beef Containing Products  02/28/2018    Hives Dicyclomine  07/27/2017    Rash Pt says not allergic to dicyclomine Dilaudid [Hydromorphone (Bulk)]  09/16/2014    Itching Doxycycline  04/15/2018    Hives Gluten Low 06/13/2013    Other (comments) GI upset Morphine Low 01/17/2011   Side Effect Rash Tramadol Low 01/17/2011   Side Effect Rash Tolerates percocet Current Immunizations  Reviewed on 9/22/2014 Name Date Influenza Vaccine 1/8/2018  Influenza Vaccine PF 10/1/2014  6:06 PM  
 Pneumococcal Polysaccharide (PPSV-23) 10/1/2014  6:08 PM  
 TB Skin Test (PPD) Intradermal 1/2/2018 Not reviewed this visit You Were Diagnosed With   
  
 Codes Comments RIVER (dyspnea on exertion)    -  Primary ICD-10-CM: R06.09 
ICD-9-CM: 786.09 Atypical chest pain     ICD-10-CM: R07.89 ICD-9-CM: 786.59 Asthma, intrinsic     ICD-10-CM: L19.694 ICD-9-CM: 493.10 Type 2 diabetes with nephropathy (HCC)     ICD-10-CM: E11.21 
ICD-9-CM: 250.40, 583.81 Essential hypertension     ICD-10-CM: I10 
ICD-9-CM: 401.9 Gastroesophageal reflux disease without esophagitis     ICD-10-CM: K21.9 ICD-9-CM: 530.81   
 PUD (peptic ulcer disease)     ICD-10-CM: K27.9 ICD-9-CM: 533.90 Arthritis     ICD-10-CM: M19.90 ICD-9-CM: 716.90 Obesity, morbid ([facility] [address]     ICD-10-CM: E66.01 
ICD-9-CM: 278.01 Dyslipidemia     ICD-10-CM: E78.5 ICD-9-CM: 272.4 Palpitations     ICD-10-CM: R00.2 ICD-9-CM: 785.1 Vitals BP Pulse Resp Height(growth percentile) Weight(growth percentile) SpO2  
 (!) 120/92 (BP 1 Location: Left arm, BP Patient Position: Sitting) 73 18 5' 4\" (1.626 m) 289 lb (131.1 kg) 99% BMI OB Status Smoking Status 49.61 kg/m2 Unknown Never Smoker Vitals History BMI and BSA Data Body Mass Index Body Surface Area  
 49.61 kg/m 2 2.43 m 2 Preferred Pharmacy Pharmacy Name Phone [id number] [place] [phone] Your Updated Medication List  
  
   
This list is accurate as of 10/11/18 11:53 AM.  Always use your most recent med list.  
  
  
  
  
 ADVAIR DISKUS 500-50 mcg/dose diskus inhaler Generic drug:  fluticasone-salmeterol INHALE CONTENTS OF 1 CAPSULE THROUGH INHALER TWICE DAILY. USE REGULARLY, RINSE MOUTH AFTER EACH USE.  
  
 albuterol 90 mcg/actuation inhaler Commonly known as:  PROVENTIL HFA, VENTOLIN HFA, PROAIR HFA  
 Take 1-2 puffs by inhalation every four (4) hours as needed for Wheezing. ALPRAZolam 0.25 mg tablet Commonly known as:  Rosendo Arabella Take 1 Tab by mouth nightly as needed for Anxiety. Max Daily Amount: 0.25 mg.  
  
 aspirin-acetaminophen-caffeine 250-250-65 mg per tablet Commonly known as:  EXCEDRIN ES Take 1 Tab by mouth.  
  
 benzonatate 200 mg capsule Commonly known as:  TESSALON  
TAKE ONE CAPSULE BY MOUTH 3 TIMES A DAY AS NEEDED FOR COUGH FOR UP TO 10 DAYS  
  
 cetirizine 10 mg tablet Commonly known as:  ZYRTEC  
TAKE 1 TABLET BY MOUTH DAILY  
  
 clotrimazole 1 % topical cream  
Commonly known as:  [place] Apply  to affected area two (2) times a day. DEXILANT 60 mg Cpdb Generic drug:  Dexlansoprazole Take  by mouth. DUEXIS 800-26.6 mg Tab Generic drug:  ibuprofen-famotidine Take 1 Tab by mouth three (3) times daily as needed. ergocalciferol 50,000 unit capsule Commonly known as:  ERGOCALCIFEROL  
TAKE 1 CAPSULE BY MOUTH EVERY 7 DAYS. fluticasone 50 mcg/actuation nasal spray Commonly known as:  Melanie Greenwood INHALE 2 SPRAYS EACH NOSTRIL DAILY  
  
 hydroCHLOROthiazide 25 mg tablet Commonly known as:  HYDRODIURIL  
TAKE 1 TABLET BY MOUTH DAILY. ipratropium 0.02 % Soln Commonly known as:  ATROVENT  
2.5 mL by Nebulization route as needed for Wheezing.  
  
 magnesium 250 mg Tab Take  by mouth.  
  
 medroxyPROGESTERone 10 mg tablet Commonly known as:  PROVERA TAKE 1 TABLET BY MOUTH EVERY DAY FOR 10 DAYS IF NO MENSES AFTER 1&1/2 MONTHS PRN  
  
 metoprolol tartrate 25 mg tablet Commonly known as:  LOPRESSOR Take 1 Tab by mouth two (2) times a day. montelukast 10 mg tablet Commonly known as:  SINGULAIR  
TAKE 1 TABLET BY MOUTH EVERY DAY  
  
 MULTIVITAMIN PO Take  by mouth. Takes one tab po occasionally. NASAL RELIEF SINUS WASH-BOTTLE NA  
USE AS DIRECTED  
  
 ondansetron 4 mg disintegrating tablet Commonly known as:  ZOFRAN ODT Take 1 Tab by mouth every eight (8) hours as needed for Nausea. predniSONE 20 mg tablet Commonly known as:  Scottie Macleod Take 40 mg by mouth daily. TYLENOL 325 mg tablet Generic drug:  acetaminophen Take  by mouth every four (4) hours as needed for Pain. ZOLMitriptan 5 mg disintegrating tablet Commonly known as:  ZOMIG-ZMT Take 1 Tab by mouth as needed for Migraine. PATIENT NEEDS TO CALL FOR  APPOINTMENT FOR FURTHER REFILLS To-Do List   
 Around 10/15/2018 ECHO:  2D ECHO COMPLETE ADULT (TTE) W OR WO CONTR Around 10/16/2018 ECG:  CARDIAC HOLTER MONITOR, 24 HOURS Introducing [facility] SERVICES! Dear Lis Singer: Thank you for requesting a [employer] account. Our records indicate that you already have an active [employer] account. You can access your account anytime at [email or web address] Did you know that you can access your hospital and ER discharge instructions at any time in [employer]? You can also review all of your test results from your hospital stay or ER visit. Additional Information If you have questions, please visit the Frequently Asked Questions section of the [employer] website at [email or web address] Remember, [employer] is NOT to be used for urgent needs. For medical emergencies, dial 911. Now available from your iPhone and Android! Please provide this summary of care documentation to your next provider. Your primary care clinician is listed as Shelly Bird. If you have any questions after today's visit, please call [phone].

## 2018-10-11 NOTE — PROGRESS NOTES
Verified patient with two patient identifiers. Medications reviewed/approved by Dr. Zeinab Mccallum. Verbal from Dr. Zeinab Mccallum to remove the medications that were deleted during the visit. Medication(s) removed: azithromycin     Chief Complaint   Patient presents with    Shortness of Breath     New patient evaluation - referred by EDILBERTO Sun    Chest Pain    Hypertension    Abnormal EKG     1. Have you been to the ER, urgent care clinic since your last visit? Hospitalized since your last visit? New pt   2. Have you seen or consulted any other health care providers outside of the [address] [place] [address] since your last visit? Include any pap smears or colon screening. New pt.

## 2018-10-11 NOTE — PROGRESS NOTES
Zayra Baig is a 41 y.o. female is here for cardiac evaluation. Hx DM II, morbid obesity, hypertension, cervical/lumbar DJD/radiculopathy, GERD/PUD, thyroid disease/goiter, migraine/tension h/a's, anxiety, asthma, atypical chest pain--seen at [facility] ER with chest pain, dyspnea, wheezing 10/9/18 with CP, dyspnea--neg EKG (borderline T wave changes), trop, BNP, CXR. Had elev d-dimer, Chest CTA neg except for borderline CM and enlarged thryoid/goiter. Previous cardiac testing with Stress Treadmill EKG 4/18 with Skyler 8:31, HR 96%, dyspnea but no EKG changes/CP. Echo 2014 with LVEF 60-65, normal.  On prednisone, inhaler/nebs. Multiple allergies. Some tachycardia, dyspnea. No further CP. In nursing school, multiple stressors. The patient denies  orthopnea, PND, LE edema, syncope, presyncope or fatigue.        Patient Active Problem List    Diagnosis Date Noted    Type 2 diabetes with nephropathy ([address] 04/17/2018    Obesity, morbid ([address] 03/08/2018    Lumbar back pain with radiculopathy affecting left lower extremity 10/25/2017    Lumbar back pain with radiculopathy affecting right lower extremity 10/25/2017    Cervical radiculopathy due to degenerative joint disease of spine 10/25/2017    Tension vascular headache 08/07/2017    Migraine with aura and without status migrainosus, not intractable 08/07/2017    Idiopathic small and large fiber sensory neuropathy 08/07/2017    Diabetic peripheral neuropathy associated with type 2 diabetes mellitus ([address] 08/07/2017    B12 deficiency 08/07/2017    Vitamin D deficiency 08/07/2017    Hypothyroidism due to acquired atrophy of thyroid 08/07/2017    TMJ (temporomandibular joint syndrome) 08/07/2017    Bilateral carpal tunnel syndrome 08/07/2017    Restless leg syndrome 05/22/2017    PUD (peptic ulcer disease)     GERD (gastroesophageal reflux disease)     Arthritis     Iron deficiency anemia secondary to inadequate dietary iron intake     Chest pain 09/15/2015    Bacterial infection of knee joint ([facility] 2014    Sepsis ([facility] 2014    SIRS without acute organ dysfunction due to infectious process ([facility] [address] 09/15/2014    Post-operative infection 2014    Hyperthyroidism complicating pregnancy     History of  section, classical 2012    Asthma, intrinsic 2012    Gluten intolerance 2012      Vivia Opitz, PA-C  Past Medical History:   Diagnosis Date    Anemia NEC     Arthritis     Asthma     Bell's palsy 2018    Chronic pain     fibromyalgia    Colitis     GERD (gastroesophageal reflux disease)     Hypertension     diastolic    Ill-defined condition     ACL repair on L leg     Other unknown and unspecified cause of morbidity or mortality     fibromyalgia    Other unknown and unspecified cause of morbidity or mortality     obesity    Pleural effusion associated with pulmonary infection     Pre-diabetes 2018    Psychiatric problem     anxiety    PUD (peptic ulcer disease)     Thyroid disease during pregnancy     hyperthyroid      Past Surgical History:   Procedure Laterality Date     DELIVERY ONLY      x2, one with classical uterine incision    HX  SECTION      x3 total    HX CHOLECYSTECTOMY      HX HEENT      sinus surgery    HX HERNIA REPAIR      HX ORTHOPAEDIC      ACL repair x2 - twice     Allergies   Allergen Reactions    Black Pepper Hives, Swelling and Cough    Mushroom Combination No.1 Shortness of Breath, Swelling and Cough    Tomato Hives, Swelling and Cough    Adhesive Tape-Silicones Hives     Causes burning at site    [employer] Hives    Dicyclomine Rash     Pt says not allergic to dicyclomine    Dilaudid [Hydromorphone (Bulk)] Itching    Doxycycline Hives    Gluten Other (comments)     GI upset    Morphine Rash    Tramadol Rash     Tolerates percocet      Family History   Problem Relation Age of Onset    Diabetes Father     Heart Attack Father      62 smoker    Hypertension Father     Asthma Father     High Cholesterol Father     Stroke Father     Rashes/Skin Problems Father      eczema    Heart Disease Father     Hypertension Mother     Asthma Mother     Hypertension Maternal Grandmother     Stroke Maternal Grandmother     Heart Disease Maternal Grandfather     Diabetes Paternal Grandmother     Dementia Paternal Grandfather     Diabetes Brother     Diabetes Sister     Other Sister      ankylosing spondylitis    Hypertension Sister     Other Sister      ankylosing spondylitis    Hypertension Brother     Asthma Daughter     Asthma Son     Asthma Son     Asthma Son     Asthma Son     Asthma Son       Social History     Social History    Marital status:      Spouse name: N/A    Number of children: N/A    Years of education: N/A     Occupational History    Not on file. Social History Main Topics    Smoking status: Never Smoker    Smokeless tobacco: Never Used    Alcohol use No    Drug use: No    Sexual activity: Yes     Partners: Male     Birth control/ protection: None     Other Topics Concern    Not on file     Social History Narrative      Current Outpatient Prescriptions   Medication Sig    ZOLMitriptan (ZOMIG-ZMT) 5 mg disintegrating tablet Take 1 Tab by mouth as needed for Migraine. PATIENT NEEDS TO CALL FOR  APPOINTMENT FOR FURTHER REFILLS    azithromycin (ZITHROMAX) 250 mg tablet 250 mg.  predniSONE (DELTASONE) 20 mg tablet 40 mg.    ALPRAZolam (XANAX) 0.25 mg tablet Take 1 Tab by mouth nightly as needed for Anxiety. Max Daily Amount: 0.25 mg.    ondansetron (ZOFRAN ODT) 4 mg disintegrating tablet Take 1 Tab by mouth every eight (8) hours as needed for Nausea.  aspirin-acetaminophen-caffeine (EXCEDRIN ES) 250-250-65 mg per tablet Take 1 Tab by mouth.  acetaminophen (TYLENOL) 325 mg tablet Take  by mouth every four (4) hours as needed for Pain.     hydroCHLOROthiazide (HYDRODIURIL) 25 mg tablet TAKE 1 TABLET BY MOUTH DAILY.  ergocalciferol (ERGOCALCIFEROL) 50,000 unit capsule TAKE 1 CAPSULE BY MOUTH EVERY 7 DAYS.    SOD CHLOR,BICARB/SQUEEZ BOTTLE (NASAL RELIEF SINUS WASH-BOTTLE NA) USE AS DIRECTED    benzonatate (TESSALON) 200 mg capsule TAKE ONE CAPSULE BY MOUTH 3 TIMES A DAY AS NEEDED FOR COUGH FOR UP TO 10 DAYS    cetirizine (ZYRTEC) 10 mg tablet TAKE 1 TABLET BY MOUTH DAILY    fluticasone (FLONASE) 50 mcg/actuation nasal spray INHALE 2 SPRAYS EACH NOSTRIL DAILY    ADVAIR DISKUS 500-50 mcg/dose diskus inhaler INHALE CONTENTS OF 1 CAPSULE THROUGH INHALER TWICE DAILY. USE REGULARLY, RINSE MOUTH AFTER EACH USE.  montelukast (SINGULAIR) 10 mg tablet TAKE 1 TABLET BY MOUTH EVERY DAY    medroxyPROGESTERone (PROVERA) 10 mg tablet TAKE 1 TABLET BY MOUTH EVERY DAY FOR 10 DAYS IF NO MENSES AFTER 1&1/2 MONTHS PRN    metoprolol tartrate (LOPRESSOR) 25 mg tablet Take 1 Tab by mouth two (2) times a day.  Dexlansoprazole (DEXILANT) 60 mg CpDB Take  by mouth.  clotrimazole (LOTRIMIN) 1 % topical cream Apply  to affected area two (2) times a day.  magnesium 250 mg tab Take  by mouth.  ipratropium (ATROVENT) 0.02 % nebulizer solution 2.5 mL by Nebulization route as needed for Wheezing.  ibuprofen-famotidine (DUEXIS) 800-26.6 mg tab Take 1 Tab by mouth three (3) times daily as needed.  MULTIVITAMIN PO Take  by mouth. Takes one tab po occasionally.  albuterol (PROVENTIL HFA, VENTOLIN HFA) 90 mcg/actuation inhaler Take 1-2 puffs by inhalation every four (4) hours as needed for Wheezing. No current facility-administered medications for this visit. Review of Symptoms:    CONST  No weight change. No fever, chills, sweats    ENT No visual changes, URI sx, sore throat    CV  See HPI   RESP  No cough, or sputum, wheezing. Also see HPI   GI  No abdominal pain or change in bowel habits. No heartburn or dysphagia.    No melena or rectal bleeding.   No dysuria, urgency, frequency, hematuria   MSKEL  No joint pain, swelling. No muscle pain. SKIN  No rash or lesions. NEURO  No headache, syncope, or seizure. No weakness, loss of sensation, or paresthesias. PSYCH  No low mood or depression  No anxiety. HE/LYMPH  No easy bruising, abnormal bleeding, or enlarged glands.         Physical ExamPhysical Exam:    Visit Vitals    BP (!) 120/92 (BP 1 Location: Left arm, BP Patient Position: Sitting)    Pulse 73    Resp 18    Ht 5' 4\" (1.626 m)    Wt 289 lb (131.1 kg)    SpO2 99%  Comment: ra    BMI 49.61 kg/m2     Gen: NAD  HEENT:  PERRL, throat clear  Neck: no adenopathy, no thyromegaly, no JVD   Heart:  Regular,Nl S1S2,  no murmur, gallop or rub.   Lungs:  clear  Abdomen:   Soft, non-tender, bowel sounds are active.   Extremities:  No edema  Pulse: symmetric  Neuro: A&O times 3, No focal neuro deficits    Cardiographics    ECG: from 10/1 and 10/9/18 reviewed--NSR, NST    Labs:   Lab Results   Component Value Date/Time    Sodium 141 10/01/2018 02:50 PM    Sodium 143 04/27/2018 08:34 AM    Sodium 139 04/15/2018 05:38 PM    Sodium 136 07/27/2017 12:41 PM    Sodium 146 (H) 07/11/2017 05:35 PM    Potassium 3.6 10/01/2018 02:50 PM    Potassium 4.0 04/27/2018 08:34 AM    Potassium 3.2 (L) 04/15/2018 05:38 PM    Potassium 3.2 (L) 07/27/2017 12:41 PM    Potassium 4.4 07/11/2017 05:35 PM    Chloride 103 10/01/2018 02:50 PM    Chloride 99 04/27/2018 08:34 AM    Chloride 101 04/15/2018 05:38 PM    Chloride 101 07/27/2017 12:41 PM    Chloride 101 07/11/2017 05:35 PM    CO2 22 10/01/2018 02:50 PM    CO2 29 04/27/2018 08:34 AM    CO2 31 04/15/2018 05:38 PM    CO2 30 07/27/2017 12:41 PM    CO2 25 07/11/2017 05:35 PM    Anion gap 7 04/15/2018 05:38 PM    Anion gap 5 07/27/2017 12:41 PM    Anion gap 6 08/26/2015 02:54 AM    Anion gap 7 09/30/2014 05:14 AM    Anion gap 8 09/28/2014 02:57 AM    Glucose 154 (H) 10/01/2018 02:50 PM    Glucose 115 (H) 04/27/2018 08:34 AM    Glucose 161 (H) 04/15/2018 05:38 PM    Glucose 127 (H) 07/27/2017 12:41 PM    Glucose 117 (H) 07/11/2017 05:35 PM    BUN 8 10/01/2018 02:50 PM    BUN 14 04/27/2018 08:34 AM    BUN 9 04/15/2018 05:38 PM    BUN 13 07/27/2017 12:41 PM    BUN 12 07/11/2017 05:35 PM    Creatinine 0.73 10/01/2018 02:50 PM    Creatinine 1.00 04/27/2018 08:34 AM    Creatinine 1.15 (H) 04/15/2018 05:38 PM    Creatinine 0.80 07/27/2017 12:41 PM    Creatinine 0.76 07/11/2017 05:35 PM    BUN/Creatinine ratio 11 10/01/2018 02:50 PM    BUN/Creatinine ratio 14 04/27/2018 08:34 AM    BUN/Creatinine ratio 8 (L) 04/15/2018 05:38 PM    BUN/Creatinine ratio 16 07/27/2017 12:41 PM    BUN/Creatinine ratio 16 07/11/2017 05:35 PM    GFR est  10/01/2018 02:50 PM    GFR est AA 83 04/27/2018 08:34 AM    GFR est AA >60 04/15/2018 05:38 PM    GFR est AA >60 07/27/2017 12:41 PM    GFR est  07/11/2017 05:35 PM    GFR est non- 10/01/2018 02:50 PM    GFR est non-AA 72 04/27/2018 08:34 AM    GFR est non-AA 53 (L) 04/15/2018 05:38 PM    GFR est non-AA >60 07/27/2017 12:41 PM    GFR est non- 07/11/2017 05:35 PM    Calcium 9.3 10/01/2018 02:50 PM    Calcium 9.6 04/27/2018 08:34 AM    Calcium 8.8 04/15/2018 05:38 PM    Calcium 8.5 07/27/2017 12:41 PM    Calcium 9.6 07/11/2017 05:35 PM    Bilirubin, total 0.9 10/01/2018 02:50 PM    Bilirubin, total 1.0 04/15/2018 05:38 PM    Bilirubin, total 0.6 07/27/2017 12:41 PM    Bilirubin, total 0.8 07/11/2017 05:35 PM    Bilirubin, total 1.2 11/21/2016 03:46 PM    AST (SGOT) 20 10/01/2018 02:50 PM    AST (SGOT) 19 04/15/2018 05:38 PM    AST (SGOT) 46 (H) 07/27/2017 12:41 PM    AST (SGOT) 23 07/11/2017 05:35 PM    AST (SGOT) 22 11/21/2016 03:46 PM    Alk. phosphatase 69 10/01/2018 02:50 PM    Alk. phosphatase 100 04/15/2018 05:38 PM    Alk. phosphatase 75 07/27/2017 12:41 PM    Alk. phosphatase 86 07/11/2017 05:35 PM    Alk.  phosphatase 78 11/21/2016 03:46 PM    Protein, total 7.0 10/01/2018 02:50 PM    Protein, total 7.6 04/15/2018 05:38 PM    Protein, total 7.6 07/27/2017 12:41 PM    Protein, total 7.8 07/11/2017 05:35 PM    Protein, total 7.0 11/21/2016 03:46 PM    Albumin 4.1 10/01/2018 02:50 PM    Albumin 3.6 04/15/2018 05:38 PM    Albumin 3.5 07/27/2017 12:41 PM    Albumin 4.2 07/11/2017 05:35 PM    Albumin 4.0 11/21/2016 03:46 PM    Globulin 4.0 04/15/2018 05:38 PM    Globulin 4.1 (H) 07/27/2017 12:41 PM    Globulin 3.7 08/26/2015 02:54 AM    Globulin 5.4 (H) 09/30/2014 05:14 AM    Globulin 5.5 (H) 09/28/2014 02:57 AM    A-G Ratio 1.4 10/01/2018 02:50 PM    A-G Ratio 0.9 (L) 04/15/2018 05:38 PM    A-G Ratio 0.9 (L) 07/27/2017 12:41 PM    A-G Ratio 1.2 07/11/2017 05:35 PM    A-G Ratio 1.3 11/21/2016 03:46 PM    ALT (SGPT) 17 10/01/2018 02:50 PM    ALT (SGPT) 23 04/15/2018 05:38 PM    ALT (SGPT) 30 07/27/2017 12:41 PM    ALT (SGPT) 24 07/11/2017 05:35 PM    ALT (SGPT) 17 11/21/2016 03:46 PM     Lab Results   Component Value Date/Time     04/15/2018 05:38 PM     Lab Results   Component Value Date/Time    Cholesterol, total 182 04/27/2018 08:34 AM    Cholesterol, total 145 08/17/2017 03:05 PM    Cholesterol, total 159 11/21/2016 03:46 PM    HDL Cholesterol 68 04/27/2018 08:34 AM    HDL Cholesterol 44 08/17/2017 03:05 PM    HDL Cholesterol 44 11/21/2016 03:46 PM    LDL, calculated 91 04/27/2018 08:34 AM    LDL, calculated 81 08/17/2017 03:05 PM    LDL, calculated 91 11/21/2016 03:46 PM    Triglyceride 115 04/27/2018 08:34 AM    Triglyceride 100 08/17/2017 03:05 PM    Triglyceride 122 11/21/2016 03:46 PM     No results found for this or any previous visit.     Assessment:         Patient Active Problem List    Diagnosis Date Noted    [facility] 04/17/2018    Obesity, morbid ([facility] 03/08/2018    Lumbar back pain with radiculopathy affecting left lower extremity 10/25/2017    Lumbar back pain with radiculopathy affecting right lower extremity 10/25/2017    Cervical radiculopathy due to degenerative joint disease of spine 10/25/2017    Tension vascular headache 2017    Migraine with aura and without status migrainosus, not intractable 2017    Idiopathic small and large fiber sensory neuropathy 2017    Diabetic peripheral neuropathy associated with type 2 diabetes mellitus ([address] 2017    B12 deficiency 2017    Vitamin D deficiency 2017    Hypothyroidism due to acquired atrophy of thyroid 2017    TMJ (temporomandibular joint syndrome) 2017    Bilateral carpal tunnel syndrome 2017    Restless leg syndrome 2017    PUD (peptic ulcer disease)     GERD (gastroesophageal reflux disease)     Arthritis     Iron deficiency anemia secondary to inadequate dietary iron intake     Chest pain 09/15/2015    Bacterial infection of knee joint ([address] 2014    Sepsis ([facility] 2014    SIRS without acute organ dysfunction due to infectious process ([facility] [facility] 09/15/2014    Post-operative infection 2014    Hyperthyroidism complicating pregnancy     History of  section, classical 2012    Asthma, intrinsic 2012    Gluten intolerance 2012      Hx DM II, morbid obesity, hypertension, cervical/lumbar DJD/radiculopathy, GERD/PUD, thyroid disease/goiter, migraine/tension h/a's, anxiety, asthma, atypical chest pain--seen at [facility] ER with chest pain, dyspnea, wheezing 10/9/18 with CP, dyspnea--neg EKG (borderline T wave changes), trop, BNP, CXR. Had elev d-dimer, Chest CTA neg except for borderline CM and enlarged thryoid/goiter. Previous cardiac testing with Stress Treadmill EKG  with Skyler 8:31, HR 96%, dyspnea but no EKG changes/CP. Echo  with LVEF 60-65, normal.  On prednisone, inhaler/nebs. Multiple allergies. Some tachycardia, dyspnea. No further CP. In nursing school, multiple stressors.      Plan:     Echo/doppler--dyspnea, cardiomegaly, hypertension, asthma  Holter--tachy, palpitations (on nebs/steroids, thryoid issues)  Had stress test 4/18 and chest CTA (no coronary calc/athero seen) 2 days ago--do not feel further ischemic testing currently indicated  Continue meds for now incl metoprolol 25mg bid  F/u with PCP as planned  To see Endocrine as planned  Pulmonary evaluation  F/u after testing to discuss    Joe Bartholomew MD

## 2018-10-17 ENCOUNTER — CLINICAL SUPPORT (OUTPATIENT)
Dept: CARDIOLOGY CLINIC | Age: 38
End: 2018-10-17

## 2018-10-17 DIAGNOSIS — R00.2 PALPITATIONS: ICD-10-CM

## 2018-10-17 RX ORDER — METOPROLOL TARTRATE 25 MG/1
25 TABLET, FILM COATED ORAL 2 TIMES DAILY
Qty: 60 TAB | Refills: 2 | Status: SHIPPED | OUTPATIENT
Start: 2018-10-17 | End: 2018-10-22 | Stop reason: SDUPTHER

## 2018-10-17 NOTE — PROGRESS NOTES
24 hour Holter monitor only. Verified patient with two patient identifiers. Pt verbalized understanding of its use. Ordering [facility]  Reason: palpitations  Start time:  4:44pm  Return date: 10/18/18        No LOS.

## 2018-10-20 ENCOUNTER — DOCUMENTATION ONLY (OUTPATIENT)
Dept: CARDIOLOGY CLINIC | Age: 38
End: 2018-10-20

## 2018-10-22 RX ORDER — METOPROLOL TARTRATE 25 MG/1
25 TABLET, FILM COATED ORAL 2 TIMES DAILY
Qty: 180 TAB | Refills: 0 | Status: SHIPPED | OUTPATIENT
Start: 2018-10-22 | End: 2018-11-21

## 2018-11-28 ENCOUNTER — TELEPHONE (OUTPATIENT)
Dept: FAMILY MEDICINE CLINIC | Age: 38
End: 2018-11-28

## 2019-01-11 ENCOUNTER — TELEPHONE (OUTPATIENT)
Dept: CARDIOLOGY CLINIC | Age: 39
End: 2019-01-11

## 2019-02-13 ENCOUNTER — OFFICE VISIT (OUTPATIENT)
Dept: CARDIOLOGY CLINIC | Age: 39
End: 2019-02-13

## 2019-02-13 VITALS
OXYGEN SATURATION: 98 % | SYSTOLIC BLOOD PRESSURE: 146 MMHG | BODY MASS INDEX: 49.17 KG/M2 | HEART RATE: 98 BPM | DIASTOLIC BLOOD PRESSURE: 96 MMHG | WEIGHT: 288 LBS | RESPIRATION RATE: 12 BRPM | HEIGHT: 64 IN

## 2019-02-13 DIAGNOSIS — E11.21 TYPE 2 DIABETES WITH NEPHROPATHY (HCC): ICD-10-CM

## 2019-02-13 DIAGNOSIS — R06.02 SOB (SHORTNESS OF BREATH): ICD-10-CM

## 2019-02-13 DIAGNOSIS — R07.2 PRECORDIAL PAIN: Primary | ICD-10-CM

## 2019-02-13 DIAGNOSIS — E66.01 OBESITY, MORBID (HCC): ICD-10-CM

## 2019-02-13 DIAGNOSIS — J45.909 ASTHMA, INTRINSIC: ICD-10-CM

## 2019-02-13 DIAGNOSIS — R00.0 TACHYCARDIA: ICD-10-CM

## 2019-02-13 DIAGNOSIS — R00.2 PALPITATIONS: ICD-10-CM

## 2019-02-13 RX ORDER — DILTIAZEM HYDROCHLORIDE 180 MG/1
180 CAPSULE, COATED, EXTENDED RELEASE ORAL DAILY
Qty: 30 CAP | Refills: 6 | Status: SHIPPED | OUTPATIENT
Start: 2019-02-13 | End: 2019-03-11 | Stop reason: SDUPTHER

## 2019-02-13 NOTE — PROGRESS NOTES
Feli Friedman is a 46 y.o. female is here for routine f/u. Hx DM II, morbid obesity, hypertension, cervical/lumbar DJD/radiculopathy, GERD/PUD, thyroid disease/goiter, migraine/tension h/a's, anxiety, asthma, atypical chest pain--seen at [facility] ER with chest pain, dyspnea, wheezing 10/9/18 with CP, dyspnea--neg EKG (borderline T wave changes), trop, BNP, CXR. Had elev d-dimer, Chest CTA neg except for borderline CM and enlarged thryoid/goiter. Previous cardiac testing with Stress Treadmill EKG 4/18 with Skyler 8:31, HR 96%, dyspnea but no EKG changes/CP. Echo 2014 with LVEF 60-65, normal.  On prednisone, inhaler/nebs. Multiple allergies. Some tachycardia, dyspnea. Stopped metoprolol (concerned about asthma). Continues to have chest pain, dyspnea, palpitations, tachycardia. Had stress test 4/18 and chest CTA (no coronary calc/athero seen). Echo 10/28/18 with LVEF 65-70, normal chambers/walls/valves, grade I diastolic. Holter monitor 10/18 with sinus tachycardia, one PVC, otherwise normal.  Has thryoid nodule and elevated T3 and referred to Endocrine. The patient denies orthopnea, PND, LE edema,  syncope, presyncope or fatigue.        Patient Active Problem List    Diagnosis Date Noted    Type 2 diabetes with nephropathy ([address] 04/17/2018    Obesity, morbid ([address] 03/08/2018    Lumbar back pain with radiculopathy affecting left lower extremity 10/25/2017    Lumbar back pain with radiculopathy affecting right lower extremity 10/25/2017    Cervical radiculopathy due to degenerative joint disease of spine 10/25/2017    Tension vascular headache 08/07/2017    Migraine with aura and without status migrainosus, not intractable 08/07/2017    Idiopathic small and large fiber sensory neuropathy 08/07/2017    Diabetic peripheral neuropathy associated with type 2 diabetes mellitus ([address] 08/07/2017    B12 deficiency 08/07/2017    Vitamin D deficiency 08/07/2017    Hypothyroidism due to acquired atrophy of thyroid 2017    TMJ (temporomandibular joint syndrome) 2017    Bilateral carpal tunnel syndrome 2017    Restless leg syndrome 2017    PUD (peptic ulcer disease)     GERD (gastroesophageal reflux disease)     Arthritis     Iron deficiency anemia secondary to inadequate dietary iron intake     Chest pain 09/15/2015    Bacterial infection of knee joint ([address] 2014    Sepsis ([address] 2014    SIRS without acute organ dysfunction due to infectious process ([address] 09/15/2014    Post-operative infection 2014    Hyperthyroidism complicating pregnancy     History of  section, classical 2012    Asthma, intrinsic 2012    Gluten intolerance 2012      Aisha Hobson PA-C  Past Medical History:   Diagnosis Date    Anemia NEC     Arthritis     Asthma     Bell's palsy 2018    Chronic pain     fibromyalgia    Colitis     GERD (gastroesophageal reflux disease)     Hypertension     diastolic    Ill-defined condition     ACL repair on L leg     Other unknown and unspecified cause of morbidity or mortality     fibromyalgia    Other unknown and unspecified cause of morbidity or mortality     obesity    Pleural effusion associated with pulmonary infection     Pre-diabetes 2018    Psychiatric problem     anxiety    PUD (peptic ulcer disease)     Thyroid disease during pregnancy     hyperthyroid      Past Surgical History:   Procedure Laterality Date     DELIVERY ONLY      x2, one with classical uterine incision    HX  SECTION      x3 total    HX CHOLECYSTECTOMY      HX HEENT      sinus surgery    HX HERNIA REPAIR      HX ORTHOPAEDIC      ACL repair x2 - twice     Allergies   Allergen Reactions    Black Pepper Hives, Swelling and Cough    Mushroom Combination No.1 Shortness of Breath, Swelling and Cough    Tomato Hives, Swelling and Cough    Adhesive Tape-Silicones Hives     Causes burning at site    [employer] Hives    Dicyclomine Rash     Pt says not allergic to dicyclomine    Dilaudid [Hydromorphone (Bulk)] Itching    Doxycycline Hives    Metoprolol Shortness of Breath     Not acute sob, but was having these recurrences while on the med that stopped once she d/c it.     Gluten Other (comments)     GI upset    Morphine Rash    Tramadol Rash     Tolerates percocet      Family History   Problem Relation Age of Onset    Diabetes Father     Heart Attack Father         62 smoker    Hypertension Father     Asthma Father     High Cholesterol Father     Stroke Father     Rashes/Skin Problems Father         eczema    Heart Disease Father     Hypertension Mother     Asthma Mother     Hypertension Maternal Grandmother     Stroke Maternal Grandmother     Heart Disease Maternal Grandfather     Diabetes Paternal Grandmother     Dementia Paternal Grandfather     Diabetes Brother     Diabetes Sister     Other Sister         ankylosing spondylitis    Hypertension Sister     Other Sister         ankylosing spondylitis    Hypertension Brother     Asthma Daughter     Asthma Son     Asthma Son     Asthma Son     Asthma Son     Asthma Son       Social History     Socioeconomic History    Marital status:      Spouse name: Not on file    Number of children: Not on file    Years of education: Not on file    Highest education level: Not on file   Social Needs    Financial resource strain: Not on file    Food insecurity - worry: Not on file    Food insecurity - inability: Not on file   Jarvam needs - medical: Not on file   Jarvam needs - non-medical: Not on file   Occupational History    Not on file   Tobacco Use    Smoking status: Never Smoker    Smokeless tobacco: Never Used   Substance and Sexual Activity    Alcohol use: No    Drug use: No    Sexual activity: Yes     Partners: Male     Birth control/protection: None   Other Topics Concern    Not on file   Social History Narrative    Not on file      Current Outpatient Medications   Medication Sig    diclofenac EC (VOLTAREN) 75 mg EC tablet Take 1 Tab by mouth two (2) times a day.  ALPRAZolam (XANAX) 0.25 mg tablet Take 1 Tab by mouth nightly as needed for Anxiety. Max Daily Amount: 0.25 mg.    hydroCHLOROthiazide (HYDRODIURIL) 25 mg tablet Take 1 Tab by mouth daily.  cyclobenzaprine (FLEXERIL) 5 mg tablet TAKE 1 TABLET BY MOUTH THREE TIMES A DAY AS NEEDED FOR MUSCLE SPASMS    ZOLMitriptan (ZOMIG-ZMT) 5 mg disintegrating tablet Take 1 Tab by mouth as needed for Migraine. PATIENT NEEDS TO CALL FOR  APPOINTMENT FOR FURTHER REFILLS    aspirin-acetaminophen-caffeine (EXCEDRIN ES) 250-250-65 mg per tablet Take 1 Tab by mouth.  acetaminophen (TYLENOL) 325 mg tablet Take  by mouth every four (4) hours as needed for Pain.  ergocalciferol (ERGOCALCIFEROL) 50,000 unit capsule TAKE 1 CAPSULE BY MOUTH EVERY 7 DAYS.    SOD CHLOR,BICARB/SQUEEZ BOTTLE (NASAL RELIEF SINUS WASH-BOTTLE NA) USE AS DIRECTED    benzonatate (TESSALON) 200 mg capsule TAKE ONE CAPSULE BY MOUTH 3 TIMES A DAY AS NEEDED FOR COUGH FOR UP TO 10 DAYS    cetirizine (ZYRTEC) 10 mg tablet TAKE 1 TABLET BY MOUTH DAILY    fluticasone (FLONASE) 50 mcg/actuation nasal spray INHALE 2 SPRAYS EACH NOSTRIL DAILY    ADVAIR DISKUS 500-50 mcg/dose diskus inhaler INHALE CONTENTS OF 1 CAPSULE THROUGH INHALER TWICE DAILY. USE REGULARLY, RINSE MOUTH AFTER EACH USE.  montelukast (SINGULAIR) 10 mg tablet TAKE 1 TABLET BY MOUTH EVERY DAY    medroxyPROGESTERone (PROVERA) 10 mg tablet TAKE 1 TABLET BY MOUTH EVERY DAY FOR 10 DAYS IF NO MENSES AFTER 1&1/2 MONTHS PRN    Dexlansoprazole (DEXILANT) 60 mg CpDB Take  by mouth.  clotrimazole (LOTRIMIN) 1 % topical cream Apply  to affected area two (2) times a day.  magnesium 250 mg tab Take  by mouth.     ipratropium (ATROVENT) 0.02 % nebulizer solution 2.5 mL by Nebulization route as needed for Wheezing.  MULTIVITAMIN PO Take  by mouth. Takes one tab po occasionally.  albuterol (PROVENTIL HFA, VENTOLIN HFA) 90 mcg/actuation inhaler Take 1-2 puffs by inhalation every four (4) hours as needed for Wheezing. No current facility-administered medications for this visit. Review of Symptoms:    CONST  No weight change. No fever, chills, sweats    ENT No visual changes, URI sx, sore throat    CV  See HPI   RESP  No cough, or sputum, wheezing. Also see HPI   GI  No abdominal pain or change in bowel habits. No heartburn or dysphagia. No melena or rectal bleeding.   No dysuria, urgency, frequency, hematuria   MSKEL  No joint pain, swelling. No muscle pain. SKIN  No rash or lesions. NEURO  No headache, syncope, or seizure. No weakness, loss of sensation, or paresthesias. PSYCH  No low mood or depression  No anxiety. HE/LYMPH  No easy bruising, abnormal bleeding, or enlarged glands.         Physical ExamPhysical Exam:    Visit Vitals  BP (!) 172/114 (BP 1 Location: Right arm, BP Patient Position: Sitting) Comment (BP Patient Position): LARGE CUFF   Pulse 98   Resp 12   Ht 5' 4\" (1.626 m)   Wt 288 lb (130.6 kg)   SpO2 98%   BMI 49.44 kg/m²     Gen: NAD  HEENT:  PERRL, throat clear  Neck: no adenopathy, no thyromegaly, no JVD   Heart:  Regular,Nl S1S2,  no murmur, gallop or rub.   Lungs:  clear  Abdomen:   Soft, non-tender, bowel sounds are active.   Extremities:  No edema  Pulse: symmetric  Neuro: A&O times 3, No focal neuro deficits    Cardiographics    ECG: NSR 93, IRBBB, LAFB, NST, no acute changes      Labs:   Lab Results   Component Value Date/Time    Sodium 142 01/10/2019 10:40 AM    Sodium 141 10/01/2018 02:50 PM    Sodium 143 04/27/2018 08:34 AM    Sodium 139 04/15/2018 05:38 PM    Sodium 136 07/27/2017 12:41 PM    Potassium 3.8 01/10/2019 10:40 AM    Potassium 3.6 10/01/2018 02:50 PM    Potassium 4.0 04/27/2018 08:34 AM    Potassium 3.2 (L) 04/15/2018 05:38 PM    Potassium 3.2 (L) 07/27/2017 12:41 PM    Chloride 102 01/10/2019 10:40 AM    Chloride 103 10/01/2018 02:50 PM    Chloride 99 04/27/2018 08:34 AM    Chloride 101 04/15/2018 05:38 PM    Chloride 101 07/27/2017 12:41 PM    CO2 26 01/10/2019 10:40 AM    CO2 22 10/01/2018 02:50 PM    CO2 29 04/27/2018 08:34 AM    CO2 31 04/15/2018 05:38 PM    CO2 30 07/27/2017 12:41 PM    Anion gap 7 04/15/2018 05:38 PM    Anion gap 5 07/27/2017 12:41 PM    Anion gap 6 08/26/2015 02:54 AM    Anion gap 7 09/30/2014 05:14 AM    Anion gap 8 09/28/2014 02:57 AM    Glucose 146 (H) 01/10/2019 10:40 AM    Glucose 154 (H) 10/01/2018 02:50 PM    Glucose 115 (H) 04/27/2018 08:34 AM    Glucose 161 (H) 04/15/2018 05:38 PM    Glucose 127 (H) 07/27/2017 12:41 PM    BUN 8 01/10/2019 10:40 AM    BUN 8 10/01/2018 02:50 PM    BUN 14 04/27/2018 08:34 AM    BUN 9 04/15/2018 05:38 PM    BUN 13 07/27/2017 12:41 PM    Creatinine 0.78 01/10/2019 10:40 AM    Creatinine 0.73 10/01/2018 02:50 PM    Creatinine 1.00 04/27/2018 08:34 AM    Creatinine 1.15 (H) 04/15/2018 05:38 PM    Creatinine 0.80 07/27/2017 12:41 PM    BUN/Creatinine ratio 10 01/10/2019 10:40 AM    BUN/Creatinine ratio 11 10/01/2018 02:50 PM    BUN/Creatinine ratio 14 04/27/2018 08:34 AM    BUN/Creatinine ratio 8 (L) 04/15/2018 05:38 PM    BUN/Creatinine ratio 16 07/27/2017 12:41 PM    GFR est  01/10/2019 10:40 AM    GFR est  10/01/2018 02:50 PM    GFR est AA 83 04/27/2018 08:34 AM    GFR est AA >60 04/15/2018 05:38 PM    GFR est AA >60 07/27/2017 12:41 PM    GFR est non-AA 97 01/10/2019 10:40 AM    GFR est non- 10/01/2018 02:50 PM    GFR est non-AA 72 04/27/2018 08:34 AM    GFR est non-AA 53 (L) 04/15/2018 05:38 PM    GFR est non-AA >60 07/27/2017 12:41 PM    Calcium 9.1 01/10/2019 10:40 AM    Calcium 9.3 10/01/2018 02:50 PM    Calcium 9.6 04/27/2018 08:34 AM    Calcium 8.8 04/15/2018 05:38 PM    Calcium 8.5 07/27/2017 12:41 PM    Bilirubin, total 0.9 10/01/2018 02:50 PM    Bilirubin, total 1.0 04/15/2018 05:38 PM    Bilirubin, total 0.6 07/27/2017 12:41 PM    Bilirubin, total 0.8 07/11/2017 05:35 PM    Bilirubin, total 1.2 11/21/2016 03:46 PM    AST (SGOT) 20 10/01/2018 02:50 PM    AST (SGOT) 19 04/15/2018 05:38 PM    AST (SGOT) 46 (H) 07/27/2017 12:41 PM    AST (SGOT) 23 07/11/2017 05:35 PM    AST (SGOT) 22 11/21/2016 03:46 PM    Alk. phosphatase 69 10/01/2018 02:50 PM    Alk. phosphatase 100 04/15/2018 05:38 PM    Alk. phosphatase 75 07/27/2017 12:41 PM    Alk. phosphatase 86 07/11/2017 05:35 PM    Alk.  phosphatase 78 11/21/2016 03:46 PM    Protein, total 7.0 10/01/2018 02:50 PM    Protein, total 7.6 04/15/2018 05:38 PM    Protein, total 7.6 07/27/2017 12:41 PM    Protein, total 7.8 07/11/2017 05:35 PM    Protein, total 7.0 11/21/2016 03:46 PM    Albumin 4.1 10/01/2018 02:50 PM    Albumin 3.6 04/15/2018 05:38 PM    Albumin 3.5 07/27/2017 12:41 PM    Albumin 4.2 07/11/2017 05:35 PM    Albumin 4.0 11/21/2016 03:46 PM    Globulin 4.0 04/15/2018 05:38 PM    Globulin 4.1 (H) 07/27/2017 12:41 PM    Globulin 3.7 08/26/2015 02:54 AM    Globulin 5.4 (H) 09/30/2014 05:14 AM    Globulin 5.5 (H) 09/28/2014 02:57 AM    A-G Ratio 1.4 10/01/2018 02:50 PM    A-G Ratio 0.9 (L) 04/15/2018 05:38 PM    A-G Ratio 0.9 (L) 07/27/2017 12:41 PM    A-G Ratio 1.2 07/11/2017 05:35 PM    A-G Ratio 1.3 11/21/2016 03:46 PM    ALT (SGPT) 17 10/01/2018 02:50 PM    ALT (SGPT) 23 04/15/2018 05:38 PM    ALT (SGPT) 30 07/27/2017 12:41 PM    ALT (SGPT) 24 07/11/2017 05:35 PM    ALT (SGPT) 17 11/21/2016 03:46 PM     Lab Results   Component Value Date/Time     04/15/2018 05:38 PM     Lab Results   Component Value Date/Time    Cholesterol, total 182 04/27/2018 08:34 AM    Cholesterol, total 145 08/17/2017 03:05 PM    Cholesterol, total 159 11/21/2016 03:46 PM    HDL Cholesterol 68 04/27/2018 08:34 AM    HDL Cholesterol 44 08/17/2017 03:05 PM    HDL Cholesterol 44 11/21/2016 03:46 PM    LDL, calculated 91 2018 08:34 AM    LDL, calculated 81 2017 03:05 PM    LDL, calculated 91 2016 03:46 PM    Triglyceride 115 2018 08:34 AM    Triglyceride 100 2017 03:05 PM    Triglyceride 122 2016 03:46 PM     No results found for this or any previous visit.     Assessment:         Patient Active Problem List    Diagnosis Date Noted    Type 2 diabetes with nephropathy ([facility] 2018    Obesity, morbid ([address] 2018    Lumbar back pain with radiculopathy affecting left lower extremity 10/25/2017    Lumbar back pain with radiculopathy affecting right lower extremity 10/25/2017    Cervical radiculopathy due to degenerative joint disease of spine 10/25/2017    Tension vascular headache 2017    Migraine with aura and without status migrainosus, not intractable 2017    Idiopathic small and large fiber sensory neuropathy 2017    Diabetic peripheral neuropathy associated with type 2 diabetes mellitus ([address] 2017    B12 deficiency 2017    Vitamin D deficiency 2017    Hypothyroidism due to acquired atrophy of thyroid 2017    TMJ (temporomandibular joint syndrome) 2017    Bilateral carpal tunnel syndrome 2017    Restless leg syndrome 2017    PUD (peptic ulcer disease)     GERD (gastroesophageal reflux disease)     Arthritis     Iron deficiency anemia secondary to inadequate dietary iron intake     Chest pain 09/15/2015    Bacterial infection of knee joint ([address] 2014    Sepsis ([address] 2014    SIRS without acute organ dysfunction due to infectious process ([address] 09/15/2014    Post-operative infection 2014    Hyperthyroidism complicating pregnancy     History of  section, classical 2012    Asthma, intrinsic 2012    Gluten intolerance 2012     Hx DM II, morbid obesity, hypertension, cervical/lumbar DJD/radiculopathy, GERD/PUD, thyroid disease/goiter, migraine/tension h/a's, anxiety, asthma, atypical chest pain--seen at [facility] ER with chest pain, dyspnea, wheezing 10/9/18 with CP, dyspnea--neg EKG (borderline T wave changes), trop, BNP, CXR. Had elev d-dimer, Chest CTA neg except for borderline CM and enlarged thryoid/goiter. Previous cardiac testing with Stress Treadmill EKG 4/18 with Skyler 8:31, HR 96%, dyspnea but no EKG changes/CP. Echo 2014 with LVEF 60-65, normal.  On prednisone, inhaler/nebs. Multiple allergies. Some tachycardia, dyspnea. Stopped metoprolol (concerned about asthma). Continues to have chest pain, dyspnea, palpitations, tachycardia. Had stress test 4/18 and chest CTA (no coronary calc/athero seen). Echo 10/28/18 with LVEF 65-70, normal chambers/walls/valves, grade I diastolic. Holter monitor 10/18 with sinus tachycardia, one PVC, otherwise normal.  Has thryoid nodule and elevated T3 and referred to Endocrine. Plan:     Cardiac w/u as noted--atypical CP, sinus tachy (asthma, obesity, etc)  BP elevated and \"intolerance\" to metoprolol (wheezing worse)--will try Cardizem   F/u with PCP as planned   Continue current care and f/u in 6 months, sooner prn.     Elis Johnson MD

## 2019-02-13 NOTE — PROGRESS NOTES
PATIENT ID VERIFIED WITH TWO PATIENT IDENTIFIERS. PATIENT MEDICATIONS REVIEWED AND APPROVED BY DR. Camacho Boateng. MEDICATIONS THAT WERE REMOVED FROM THIS VISIT HAVE BEEN APPROVED BY DR. Camacho Boateng. ELIZABETH Peña  Chief Complaint   Patient presents with    Palpitations     Having more palps since holter Oct. 2018. 1. Have you been to the ER, urgent care clinic since your last visit? Hospital[facility] since your last visit? [facility]    2. Have you seen or consulted any other health care providers outside of the [address] [place] [address] since your last visit? Include any pap smears or colon screening.  YES  Dr. Kolton Blakely for thyroid nodule Dec 2018

## 2019-03-11 RX ORDER — DILTIAZEM HYDROCHLORIDE 180 MG/1
180 CAPSULE, COATED, EXTENDED RELEASE ORAL DAILY
Qty: 90 CAP | Refills: 1 | Status: SHIPPED | OUTPATIENT
Start: 2019-03-11 | End: 2020-03-25 | Stop reason: SDUPTHER

## 2019-05-06 ENCOUNTER — OFFICE VISIT (OUTPATIENT)
Dept: ENDOCRINOLOGY | Age: 39
End: 2019-05-06

## 2019-05-06 VITALS
WEIGHT: 289.8 LBS | DIASTOLIC BLOOD PRESSURE: 84 MMHG | SYSTOLIC BLOOD PRESSURE: 120 MMHG | HEIGHT: 64 IN | BODY MASS INDEX: 49.47 KG/M2 | HEART RATE: 83 BPM

## 2019-05-06 DIAGNOSIS — R73.03 PRE-DIABETES: ICD-10-CM

## 2019-05-06 DIAGNOSIS — R79.89 LOW TSH LEVEL: Primary | ICD-10-CM

## 2019-05-06 RX ORDER — BENZONATATE 100 MG/1
100 CAPSULE ORAL
COMMUNITY
End: 2019-07-31

## 2019-05-06 RX ORDER — EPINEPHRINE 0.3 MG/.3ML
INJECTION SUBCUTANEOUS
COMMUNITY
Start: 2019-03-07

## 2019-05-06 NOTE — PROGRESS NOTES
Chief Complaint   Patient presents with    New Patient     PCP and pharmacy confirmed     Thyroid Problem     History of Present Illness: Meme Vasquez is a 46 y.o. female presents for evaluation of low TSH. Reports she has seen another endocrinologist, Dr Mali Alvarado. Was told she had a goiter. Also had a biopsy - benign. Ultrasound   Left nodule - 1.8 x 1.7 x 2.6 cm - hypoechoic - this was biopsied and report was benign - bland follicular cells, colloid and a few hurthle cells. TSH 0.44  Free T4: 1.18. Free T3 - 4.2 (upper limit normal 3.9)    Having more anxiety of late. Had panic attack in class    BMI 49. Wt was 248 in 2012. Now 289. She has shortness of breath.   + fatigue  Has heat and cold intolerance. Does not sleep well. Awakens feeling unrefreshed. She does snore. She has NEERAJ. Started using CPAP machine and not feeling significantly different    Diet:  - Breakfast: used to make smoothies. Generally skips. occ granola bar or yogurt or sandwich or Kosovan toast.   - Lunch: skipped  - Dinner: [employer] - sizzling chicken and shrimp - small piece of mozarella stick  - Beverages: water. Sometimes soda. - Snacks:pistachio, or trail mix. Suspects she has gluten sensitivity - has migraines, skin irritation, etc.  Exercise - goes to gym periodically. Walks when she can    Shoe size was 9, now 10.5  Does have more headaches. Wedding ring no longer fits. No changes in spacing between teeth. Does have some arthritis in knees. Social  E[place]est 19, 16, 14, 15, 6, 8  Busy with her school and kids' schools. In school for nursing. Very close to graduating.        Past Medical History:   Diagnosis Date    Anemia NEC     Arthritis     Asthma     Bell's palsy 04/2018    Chronic pain     fibromyalgia    Colitis     GERD (gastroesophageal reflux disease)     Hypertension     diastolic    Ill-defined condition     ACL repair on L leg     Other unknown and unspecified cause of morbidity or mortality     fibromyalgia    Other unknown and unspecified cause of morbidity or mortality     obesity    Pleural effusion associated with pulmonary infection     Pre-diabetes 2018    Psychiatric problem     anxiety    PUD (peptic ulcer disease)     Thyroid disease during pregnancy     hyperthyroid     Past Surgical History:   Procedure Laterality Date     DELIVERY ONLY      x2, one with classical uterine incision    HX  SECTION      x3 total    HX CHOLECYSTECTOMY      HX HEENT      sinus surgery    HX HERNIA REPAIR      HX ORTHOPAEDIC      ACL repair x2 - twice     Current Outpatient Medications   Medication Sig    EPINEPHrine (EPIPEN 2-TOO) 0.3 mg/0.3 mL injection by IntraMUSCular route.  fluticasone propion-salmeterol (ADVAIR HFA) 115-21 mcg/actuation inhaler Take 2 Puffs by inhalation two (2) times a day.  albuterol-ipratropium (DUO-NEB) 2.5 mg-0.5 mg/3 ml nebu 3 mL by Nebulization route every six (6) hours as needed (sob, wheee).  allergy injection 0.5 mL    dilTIAZem CD (CARDIZEM CD) 180 mg ER capsule Take 1 Cap by mouth daily.  cyclobenzaprine (FLEXERIL) 5 mg tablet TAKE 1 TABLET BY MOUTH THREE TIMES A DAY AS NEEDED FOR MUSCLE SPASMS    diclofenac EC (VOLTAREN) 75 mg EC tablet Take 1 Tab by mouth two (2) times a day.  ALPRAZolam (XANAX) 0.25 mg tablet Take 1 Tab by mouth nightly as needed for Anxiety. Max Daily Amount: 0.25 mg.    hydroCHLOROthiazide (HYDRODIURIL) 25 mg tablet Take 1 Tab by mouth daily.  ZOLMitriptan (ZOMIG-ZMT) 5 mg disintegrating tablet Take 1 Tab by mouth as needed for Migraine. PATIENT NEEDS TO CALL FOR  APPOINTMENT FOR FURTHER REFILLS    aspirin-acetaminophen-caffeine (EXCEDRIN ES) 250-250-65 mg per tablet Take 1 Tab by mouth.  acetaminophen (TYLENOL) 325 mg tablet Take  by mouth every four (4) hours as needed for Pain.     SOD CHLOR,BICARB/SQUEEZ BOTTLE (NASAL RELIEF SINUS WASH-BOTTLE NA) USE AS DIRECTED    cetirizine (ZYRTEC) 10 mg tablet TAKE 1 TABLET BY MOUTH DAILY    ADVAIR DISKUS 500-50 mcg/dose diskus inhaler INHALE CONTENTS OF 1 CAPSULE THROUGH INHALER TWICE DAILY. USE REGULARLY, RINSE MOUTH AFTER EACH USE.  montelukast (SINGULAIR) 10 mg tablet TAKE 1 TABLET BY MOUTH EVERY DAY    medroxyPROGESTERone (PROVERA) 10 mg tablet TAKE 1 TABLET BY MOUTH EVERY DAY FOR 10 DAYS IF NO MENSES AFTER 1&1/2 MONTHS PRN    Dexlansoprazole (DEXILANT) 60 mg CpDB Take  by mouth.  clotrimazole (LOTRIMIN) 1 % topical cream Apply  to affected area two (2) times a day.  ipratropium (ATROVENT) 0.02 % nebulizer solution 2.5 mL by Nebulization route as needed for Wheezing.  albuterol (PROVENTIL HFA, VENTOLIN HFA) 90 mcg/actuation inhaler Take 1-2 puffs by inhalation every four (4) hours as needed for Wheezing.  benzonatate (TESSALON) 100 mg capsule Take 100 mg by mouth.  ergocalciferol (ERGOCALCIFEROL) 50,000 unit capsule TAKE 1 CAPSULE BY MOUTH EVERY 7 DAYS.  MULTIVITAMIN PO Take  by mouth. Takes one tab po occasionally. No current facility-administered medications for this visit. Allergies   Allergen Reactions    Black Pepper Hives, Swelling and Cough    Mushroom Combination No.1 Shortness of Breath, Swelling and Cough    Tomato Hives, Swelling and Cough    Adhesive Tape-Silicones Hives     Causes burning at site    [employer] Hives    Dicyclomine Rash     Pt says not allergic to dicyclomine    Dilaudid [Hydromorphone (Bulk)] Itching    Doxycycline Hives    Metoprolol Shortness of Breath     Not acute sob, but was having these recurrences while on the med that stopped once she d/c it.     Gluten Other (comments)     GI upset    Morphine Rash    Tramadol Rash     Tolerates percocet     Family History   Problem Relation Age of Onset    Diabetes Father     Heart Attack Father         62 smoker    Hypertension Father     Asthma Father     High Cholesterol Father     Stroke Father     Rashes/Skin Problems Father         eczema    Heart Disease Father     Hypertension Mother     Asthma Mother     Hypertension Maternal Grandmother     Stroke Maternal Grandmother     Heart Disease Maternal Grandfather     Diabetes Paternal Grandmother     Dementia Paternal Grandfather     Diabetes Brother     Diabetes Sister     Other Sister         ankylosing spondylitis    Hypertension Sister     Other Sister         ankylosing spondylitis    Hypertension Brother     Asthma Daughter     Asthma Son     Asthma Son     Asthma Son     Asthma Son     Asthma Son      Social History     Socioeconomic History    Marital status:      Spouse name: Not on file    Number of children: Not on file    Years of education: Not on file    Highest education level: Not on file   Occupational History    Not on file   Social Needs    Financial resource strain: Not on file    Food insecurity:     Worry: Not on file     Inability: Not on file    Transportation needs:     Medical: Not on file     Non-medical: Not on file   Tobacco Use    Smoking status: Never Smoker    Smokeless tobacco: Never Used   Substance and Sexual Activity    Alcohol use: No    Drug use: No    Sexual activity: Yes     Partners: Male     Birth control/protection: None   Lifestyle    Physical activity:     Days per week: Not on file     Minutes per session: Not on file    Stress: Not on file   Relationships    Social connections:     Talks on phone: Not on file     Gets together: Not on file     Attends Cheondoism service: Not on file     Active member of club or organization: Not on file     Attends meetings of clubs or organizations: Not on file     Relationship status: Not on file    Intimate partner violence:     Fear of current or ex partner: Not on file     Emotionally abused: Not on file     Physically abused: Not on file     Forced sexual activity: Not on file   Other Topics Concern    Not on file Social History Narrative    Not on file     Physical Examination:  Visit Vitals  /84 (BP 1 Location: Left arm, BP Patient Position: Sitting)   Pulse 83   Ht 5' 4\" (1.626 m)   Wt 289 lb 12.8 oz (131.5 kg)   BMI 49.74 kg/m²   -   - General: pleasant, no distress,   - HEENT:No scleral/conjunctival injection, EOMI,  MMM  - Neck: + acanthosis nigricans.   - Cardiovascular: regular, normal rate  - Respiratory: normal effort  - Integumentary:  no edema  - Neurological: alert  - Psychiatric: normal mood and affect    Data Reviewed:   Lab Results   Component Value Date/Time    Hemoglobin A1c 6.0 10/01/2018 02:50 PM      Lab Results   Component Value Date/Time    Sodium 142 01/10/2019 10:40 AM    Potassium 3.8 01/10/2019 10:40 AM    Creatinine 0.78 01/10/2019 10:40 AM    Microalb/Creat ratio (ug/mg creat.) 3.1 04/27/2018 08:34 AM        Lab Results   Component Value Date/Time    Cholesterol, total 182 04/27/2018 08:34 AM    HDL Cholesterol 68 04/27/2018 08:34 AM    LDL, calculated 91 04/27/2018 08:34 AM    Triglyceride 115 04/27/2018 08:34 AM      Lab Results   Component Value Date/Time    TSH 0.267 10/10/2018 02:20 PM    T4, Free 1.50 10/10/2018 02:20 PM        Assessment/Plan:   1. Low TSH level   - repeat labs today. May have toxic nodular goiter. If TSH is low again, would proceed with thyroid uptake scan. Otherwise, will recommend continuing to follow every 6-12 months. I think much of her symptomatology is related to obesity/weight gain   2. BMI 45.0-49.9, adult ([facility] [address]   - encouraged her to beverages with added sugars. Recommended she eat calories more earlier in the day. Encouraged her to get more exercise  - discussed multitude of health conditions which are associated with weight  - energy, mood and sleep typically improve with weight loss. 3. Pre-diabetes   - weight loss via dietary efforts and exercise encouraged .      Greater than 50% of 45 minute visit was spent counseling the patient about above. Patient Instructions   Low TSH and thyroid nodule    Reassess. Consider thyroid uptake scan if TSH remains low    BMI 49  Avoid liquid calories  Aim to eat more of calories for breakfast and lunch    Assure good rest at night   Increase exercise/walking. Follow-up and Dispositions    · Return in about 6 months (around 11/6/2019).

## 2019-05-06 NOTE — PATIENT INSTRUCTIONS
Low TSH and thyroid nodule    Reassess. Consider thyroid uptake scan if TSH remains low    BMI 49  Avoid liquid calories  Aim to eat more of calories for breakfast and lunch    Assure good rest at night   Increase exercise/walking.

## 2019-05-07 LAB — SPECIMEN STATUS REPORT, ROLRST: NORMAL

## 2019-05-12 LAB
IGF-I SERPL-MCNC: 128 NG/ML
T4 FREE SERPL-MCNC: 1.32 NG/DL (ref 0.82–1.77)
TSH RECEP AB SER-ACNC: <0.5 IU/L (ref 0–1.75)
TSH SERPL DL<=0.005 MIU/L-ACNC: 0.93 UIU/ML (ref 0.45–4.5)

## 2019-05-30 ENCOUNTER — HOSPITAL ENCOUNTER (OUTPATIENT)
Dept: MRI IMAGING | Age: 39
Discharge: HOME | End: 2019-05-30
Attending: ORTHOPAEDIC SURGERY
Payer: COMMERCIAL

## 2019-05-30 DIAGNOSIS — M54.31 SCIATICA OF RIGHT SIDE: ICD-10-CM

## 2019-05-30 DIAGNOSIS — M54.50 LOW BACK PAIN: ICD-10-CM

## 2019-05-30 PROCEDURE — 72148 MRI LUMBAR SPINE W/O DYE: CPT

## 2020-03-29 PROBLEM — R94.6 ABNORMAL THYROID FUNCTION TEST: Status: ACTIVE | Noted: 2019-01-10

## 2020-03-29 PROBLEM — H52.03 HYPERMETROPIA OF BOTH EYES: Status: ACTIVE | Noted: 2018-05-14

## 2020-03-29 PROBLEM — F51.05 INSOMNIA DUE TO OTHER MENTAL DISORDER: Status: ACTIVE | Noted: 2019-08-01

## 2020-03-29 PROBLEM — G47.30 SLEEP APNEA: Status: ACTIVE | Noted: 2019-03-14

## 2020-03-29 PROBLEM — F99 INSOMNIA DUE TO OTHER MENTAL DISORDER: Status: ACTIVE | Noted: 2019-08-01

## 2020-03-29 PROBLEM — K21.9 GERD (GASTROESOPHAGEAL REFLUX DISEASE): Status: ACTIVE | Noted: 2018-12-14

## 2020-03-29 PROBLEM — E04.9 GOITER: Status: ACTIVE | Noted: 2018-12-12

## 2020-04-23 ENCOUNTER — HOSPITAL ENCOUNTER (OUTPATIENT)
Dept: NUCLEAR MEDICINE | Age: 40
Discharge: HOME | End: 2020-04-23
Attending: INTERNAL MEDICINE
Payer: COMMERCIAL

## 2020-04-23 DIAGNOSIS — E04.9 GOITER: ICD-10-CM

## 2020-04-23 DIAGNOSIS — R94.6 ABNORMAL THYROID FUNCTION TEST: ICD-10-CM

## 2020-04-23 PROCEDURE — 78014 THYROID IMAGING W/BLOOD FLOW: CPT

## 2020-04-24 ENCOUNTER — HOSPITAL ENCOUNTER (OUTPATIENT)
Dept: NUCLEAR MEDICINE | Age: 40
Discharge: HOME | End: 2020-04-24
Attending: INTERNAL MEDICINE
Payer: COMMERCIAL

## 2020-04-24 ENCOUNTER — HOSPITAL ENCOUNTER (OUTPATIENT)
Dept: ULTRASOUND IMAGING | Age: 40
Discharge: HOME | End: 2020-04-24
Attending: INTERNAL MEDICINE
Payer: COMMERCIAL

## 2020-04-24 DIAGNOSIS — R94.6 NONSPECIFIC ABNORMAL RESULTS OF THYROID FUNCTION STUDY: ICD-10-CM

## 2020-04-24 DIAGNOSIS — E04.9 ENLARGEMENT OF THYROID: ICD-10-CM

## 2020-04-24 PROCEDURE — 76536 US EXAM OF HEAD AND NECK: CPT

## 2020-04-24 NOTE — PROGRESS NOTES
Results and comments sent through my chart to patient. Imaging of the thyroid has returned and indicates that you should follow up with endocrinology for a fine needle aspiration. Do you have an endocrinologist who can do this? Just wanted to double check!

## 2020-04-29 ENCOUNTER — VIRTUAL VISIT (OUTPATIENT)
Dept: ENDOCRINOLOGY | Age: 40
End: 2020-04-29

## 2020-04-29 DIAGNOSIS — E05.10 TOXIC SOLITARY THYROID NODULE: Primary | ICD-10-CM

## 2020-04-29 NOTE — PROGRESS NOTES
Chief Complaint   Patient presents with    Thyroid Problem     transition from [facility] [place] November Other     pcp and pharmacy confirmed       **THIS IS A VIRTUAL VISIT VIA A VIDEO SYNCHRONOUS DISCUSSION. PATIENT AGREED TO HAVE THEIR CARE DELIVERED OVER A DOXY. ME VIDEO VISIT IN PLACE OF THEIR REGULARLY SCHEDULED OFFICE VISIT**    History of Present Illness: Sherron Rubin is a 45 y.o. female here for follow up of thyroid. She was last seen by Dr. Alton Dean in 5/19 and this is her first visit with me. Her PCP recently ordered TFTs that showed a TSH of 1.8 in 3/20 with a normal FT4 and total T3. She also ordered a thyroid ultrasound that showed a 2.8 cm left lobe nodule and a thyroid uptake scan that showed mildly increased uptake of 36% but decreased uptake in the left lobe nodule and her PCP thought a biopsy should be considered. In review of her chart through care everywhere, she actually had a biopsy of this nodule in Jan 2019 at [facility] that came back benign. Her ultrasound in 12/18 showed this nodule to be 2.6 cm in size so it has only increased by 2 mm over the past 16 months. She doesn't think her PCP was aware that this biopsy was already done. She states she has noticed some difficult projecting her voice while singing. She has not had any significant dysphagia or dyspnea when supine but does tend to sleep propped up to help with her GERD. She hasn't felt that her neck has increased in size. Current Outpatient Medications   Medication Sig    spironolactone (ALDACTONE) 25 mg tablet Take 1 Tab by mouth daily.  ergocalciferol (ERGOCALCIFEROL) 1,250 mcg (50,000 unit) capsule Take 1 Cap by mouth every seven (7) days.  traZODone (DESYREL) 50 mg tablet Take 50 mg by mouth nightly as needed.  SUMAtriptan (IMITREX) 20 mg/actuation nasal spray Use in the nostril opposite to the headache    lidocaine (LIDOCAINE PAIN RELIEF) 4 % patch 1 Patch by TransDERmal route every twenty-four (24) hours.  (Patient taking differently: 1 Patch by TransDERmal route as needed.)    triamcinolone (NASACORT AQ) 55 mcg nasal inhaler 2 Sprays daily.  EPINEPHrine (EPIPEN 2-TOO) 0.3 mg/0.3 mL injection by IntraMUSCular route.  albuterol-ipratropium (DUO-NEB) 2.5 mg-0.5 mg/3 ml nebu 3 mL by Nebulization route every six (6) hours as needed (sob, wheee).  aspirin-acetaminophen-caffeine (EXCEDRIN ES) 250-250-65 mg per tablet Take 1 Tab by mouth.  acetaminophen (TYLENOL) 325 mg tablet Take  by mouth every four (4) hours as needed for Pain.  SOD CHLOR,BICARB/SQUEEZ BOTTLE (NASAL RELIEF SINUS WASH-BOTTLE NA) as needed.  cetirizine (ZYRTEC) 10 mg tablet TAKE 1 TABLET BY MOUTH DAILY    montelukast (SINGULAIR) 10 mg tablet TAKE 1 TABLET BY MOUTH EVERY DAY    Dexlansoprazole (DEXILANT) 60 mg CpDB Take  by mouth daily.  ipratropium (ATROVENT) 0.02 % nebulizer solution 2.5 mL by Nebulization route as needed for Wheezing.  albuterol (PROVENTIL HFA, VENTOLIN HFA) 90 mcg/actuation inhaler Take 1-2 puffs by inhalation every four (4) hours as needed for Wheezing.  fluticasone propion-salmeterol (ADVAIR HFA) 115-21 mcg/actuation inhaler Take 2 Puffs by inhalation two (2) times a day. No current facility-administered medications for this visit. Allergies   Allergen Reactions    Black Pepper Hives, Swelling and Cough    Mushroom Combination No.1 Shortness of Breath, Swelling and Cough    Tomato Hives, Swelling and Cough    Adhesive Tape-Silicones Hives     Causes burning at site    [employer] Hives    Dicyclomine Rash     Pt says not allergic to dicyclomine    Dilaudid [Hydromorphone (Bulk)] Itching    Doxycycline Hives    Metoprolol Shortness of Breath     Not acute sob, but was having these recurrences while on the med that stopped once she d/c it.     Gluten Other (comments)     GI upset    Morphine Rash    Tramadol Rash     Tolerates percocet     Review of Systems: PER HPI    Physical Examination:  - GENERAL: NCAT, Appears well nourished   - EYES: EOMI, non-icteric, no proptosis   - Ear/Nose/Throat: NCAT, no visible inflammation or masses   - CARDIOVASCULAR: no cyanosis, no visible JVD   - RESPIRATORY: respiratory effort normal without any distress or labored breathing   - MUSCULOSKELETAL: Normal ROM of neck and upper extremities observed   - SKIN: No rash on face  - NEUROLOGIC:  No facial asymmetry (Cranial nerve 7 motor function), No gaze palsy   - PSYCHIATRIC: Normal affect, Normal insight and judgement       Data Reviewed:   Component      Latest Ref Rng & Units 4/1/2020 4/1/2020 3/25/2020           3:00 PM  3:00 PM  3:37 PM   T4, Free      0.82 - 1.77 ng/dL 1.29     TSH      0.450 - 4.500 uIU/mL   1.800   T3, total      71 - 180 ng/dL  128      EXAM: US THYROID/PARATHYROID/SOFT TISS      INDICATION: Thyroid enlargement. .     COMPARISON: None.     TECHNIQUE: Real-time sonography of the thyroid gland was performed with a high  frequency linear transducer. Multiple static images were obtained.     FINDINGS:  The thyroid is homogeneous in echotexture with the exception of a 3.6 x 2.9 x  3.4 mm colloid cyst in the right posterior thyroid lobe and a 2.8 x 1.9 x 1.7 cm  heterogeneous multiloculated nodule in the left mid thyroid. There is no  calcification.     There is no adenopathy. There is no altered vascularity.      The right lobe measures 6.0 x 2.1 x 2.0 cm and the left lobe measures 5.6 x 2.4  x 2.4 cm. The isthmus measures 0.7 cm.     IMPRESSION  IMPRESSION:   2.8 cm solitary left thyroid nodule  -------------------------------------------------------------------------------------------------------------------  INDICATION: History of goiter     TECHNIQUE:  Nuclear thyroid scan and uptake were obtained 24 hours following p.o.  administration of 304 uCi Iodine 123.  Imaging was performed in the anterior and  bilateral oblique projections.     FINDINGS:  24-hour thyroid radioiodine uptake is 36.6% (normal: 10% - 30%).    The thyroid gland demonstrates area of diminished activity in the left lower  pole. Ultrasound demonstrated a dominant nodule in this region. There is uniform  activity in the right thyroid.     IMPRESSION  IMPRESSION:  1. 24-hour uptake is elevated and equals 36.6%. 2. Imaging of the thyroid demonstrates an area of mild diminished activity in  the lower pole left thyroid. Ultrasound demonstrated a dominant nodule in this  region and fine-needle aspiration is recommended. 23X    Assessment/Plan:   1. Toxic solitary thyroid nodule: She had a low TSH of 0.27 in 10/18 but all other values in our system have been normal including her most recent value of 1.8 in 3/20. Her ultrasound in 4/20 showed a 2.8 cm left lobe nodule that had increased in size slightly from 2.6 cm in 12/18. This was biopsied in 1/19 and was benign. She had a thyroid uptake scan that showed 36% uptake with diminished activity in this nodule. Even though this constitutes a cold nodule, the fact that she already had this biopsied and it was benign and has not significantly increased in size from 12/18 to 4/20, I don't feel there is any indication to biopsy this again. The likelihood of thyroid cancer in a gland that has increased uptake is very low so she is agreeable to not having another biopsy especially since her first biopsy was very difficult. We discussed that we have 3 options: 1) watchful waiting with following TSH levels over time to see if she goes on to develop clinical hyperthyroidism from this nodule in the future, 2) surgical removal of the nodule, or 3) treatment with radioactive iodine to see if it will shrink her nodule and overall gland size to help with any mild compressive symptoms she is having. I did not feel that surgery was indicated at this time and she didn't want to pursue this route either. She would like to try a treatment dose of AMARO so I told her I would order this.   I did tell her that there is a chance that this could make her permanently hypothyroid such that she would need to take thyroid hormone the rest of her life but she is willing to take this chance. We will follow her TSH levels over time after treatment. We spent 30 minutes of face to face time together and > 50% of the time was spent in counseling regarding management of this condition. - order radioactive iodine treatment dose  - check TSH and free T4 6 weeks after treatment, 3 months after treatment and prior to next visit         Patient Instructions   1) You do not need another biopsy of your nodule as this was done in Jan 2019 and was benign (non-cancerous) and the likelihood of cancer in a thyroid gland that shows increased uptake is very low. 2) I have placed an order for a treatment dose of radioactive iodine to see if this will help shrink your nodule and your gland to prevent hyperthyroidism in the future and help with any compression on the nerve going to your vocal cord and prevent problems swallowing and breathing in the future. Please call the Patient Care team at [phone] to schedule this treatment at your earliest convenience. 3) I will mail you 3 lab slips, one for 6 weeks after treatment, one for 3 months after treatment and one to use prior to your next visit. Please put these in the glove compartment or other safe spot where you keep your medical papers and I will send you a reminder to have your labs drawn at these times. 4) Please come for a follow up visit on 10/30/20 at 2:50pm in our [place] office.           Follow-up and Dispositions    · Return for 10/30/20 at 2:50pm.                 Copy sent to:  Erich Nava, DO    Lab follow up: 8/12/20  Component      Latest Ref Rng & Units 8/7/2020 8/7/2020          12:40 PM 12:40 PM   T4, Free      0.82 - 1.77 ng/dL  2.13 (H)   TSH      0.450 - 4.500 uIU/mL 0.012 (L)      Sent her the following message through [employer]:  It appears the labs that were drawn in June were under Dr. Rishi Hart name so I never saw them but your thyroid levels were normal at that time. Currently your TSH has gone low at 0.012 and your free T4 is high at 2.13. Likely this pattern is from the radioactive iodine killing the thyroid nodule and causing release of thyroid hormone into the blood stream.  Therefore, I don't think you need any thyroid medication at this time and we'll repeat your levels again prior to your visit in October. Hopefully over the next few months, your levels will come back to normal and your nodule will shrink from the treatment with the radioactive iodine.

## 2020-04-29 NOTE — PATIENT INSTRUCTIONS
1) You do not need another biopsy of your nodule as this was done in Jan 2019 and was benign (non-cancerous) and the likelihood of cancer in a thyroid gland that shows increased uptake is very low. 2) I have placed an order for a treatment dose of radioactive iodine to see if this will help shrink your nodule and your gland to prevent hyperthyroidism in the future and help with any compression on the nerve going to your vocal cord and prevent problems swallowing and breathing in the future. Please call the Patient Care team at [phone] to schedule this treatment at your earliest convenience. 3) I will mail you 3 lab slips, one for 6 weeks after treatment, one for 3 months after treatment and one to use prior to your next visit. Please put these in the glove compartment or other safe spot where you keep your medical papers and I will send you a reminder to have your labs drawn at these times. 4) Please come for a follow up visit on 10/30/20 at 2:50pm in our [place] office.

## 2020-04-29 NOTE — LETTER
5/14/2020 7:17 AM 
 
Ms. Zendejas [address] [id number] Since you haven't read the message I sent you on 4/29/20 through [address], I wanted to send you a letter with the message: 
 
1) You do not need another biopsy of your nodule as this was done in Jan 2019 and was benign (non-cancerous) and the likelihood of cancer in a thyroid gland that shows increased uptake is very low. 2) I have placed an order for a treatment dose of radioactive iodine to see if this will help shrink your nodule and your gland to prevent hyperthyroidism in the future and help with any compression on the nerve going to your vocal cord and prevent problems swallowing and breathing in the future.  Please call the Patient Care team at [phone] to schedule this treatment at your earliest convenience. 3) I will mail you 3 lab slips (already done), one for 6 weeks after treatment, one for 3 months after treatment and one to use prior to your next visit. Shu Sanchez put these in the glove compartment or other safe spot where you keep your medical papers and I will send you a reminder to have your labs drawn at these times. 4) Please come for a follow up visit on 10/30/20 at 2:50pm in our [place] office.   
 
 
 
 
 
 
Sincerely, 
 
 
Gm Olea MD

## 2020-05-06 ENCOUNTER — HOSPITAL ENCOUNTER (OUTPATIENT)
Dept: NUCLEAR MEDICINE | Age: 40
Discharge: HOME | End: 2020-05-06
Attending: INTERNAL MEDICINE
Payer: COMMERCIAL

## 2020-05-06 DIAGNOSIS — E05.10 TOXIC SOLITARY THYROID NODULE: ICD-10-CM

## 2020-05-06 PROCEDURE — 79005 NUCLEAR RX ORAL ADMIN: CPT

## 2020-06-19 ENCOUNTER — TELEPHONE (OUTPATIENT)
Dept: ENDOCRINOLOGY | Age: 40
End: 2020-06-19

## 2020-06-19 NOTE — TELEPHONE ENCOUNTER
Please let her know that we already did mail her lab slips to the PO Box on file but we can do so again to be safe. Please reprint both orders and mail to her. If she wants to go to her PCP's office to do the blood that is fine with me as long as I get a copy of the results.

## 2020-06-19 NOTE — TELEPHONE ENCOUNTER
Pt notified of message per Dr. Tru Lewis and voiced understanding of what was read to her. She stated she can see the future order but not the one for 6 weeks. She also stated she will be going to a [facility] facility next week to have her labs drawn. I informed her that they can see the order in her Mychart however if not I will fax it to them once she arrives. I gave her my direct number for the nurse to call me if neede.

## 2020-07-29 DIAGNOSIS — E05.10 TOXIC SOLITARY THYROID NODULE: ICD-10-CM

## 2020-08-05 ENCOUNTER — OFFICE VISIT (OUTPATIENT)
Dept: PRIMARY CARE CLINIC | Age: 40
End: 2020-08-05

## 2020-08-05 DIAGNOSIS — Z20.828 EXPOSURE TO SARS-ASSOCIATED CORONAVIRUS: Primary | ICD-10-CM

## 2020-08-05 NOTE — PROGRESS NOTES
Pt presents to the flu clinic for covid testing. Pt had a virtual visit with PCP yesterday. She reports that she was exposed at work and is having sx. She declined to see the doctor today.  SHANNON

## 2020-08-07 ENCOUNTER — TELEPHONE (OUTPATIENT)
Dept: PRIMARY CARE CLINIC | Age: 40
End: 2020-08-07

## 2020-08-07 LAB — SARS-COV-2, NAA: NOT DETECTED

## 2020-08-10 LAB
T4 FREE SERPL-MCNC: 2.13 NG/DL (ref 0.82–1.77)
TSH SERPL DL<=0.005 MIU/L-ACNC: 0.01 UIU/ML (ref 0.45–4.5)

## 2020-10-16 DIAGNOSIS — E05.10 TOXIC SOLITARY THYROID NODULE: ICD-10-CM

## 2020-10-23 ENCOUNTER — HOSPITAL ENCOUNTER (OUTPATIENT)
Dept: LAB | Age: 40
Discharge: HOME | End: 2020-10-23
Payer: COMMERCIAL

## 2020-10-23 PROCEDURE — 84439 ASSAY OF FREE THYROXINE: CPT

## 2020-10-23 PROCEDURE — 84443 ASSAY THYROID STIM HORMONE: CPT

## 2020-10-24 LAB
T4 FREE SERPL-MCNC: 1.28 NG/DL (ref 0.82–1.77)
TSH SERPL DL<=0.005 MIU/L-ACNC: 0.24 UIU/ML (ref 0.45–4.5)

## 2020-10-30 ENCOUNTER — VIRTUAL VISIT (OUTPATIENT)
Dept: ENDOCRINOLOGY | Age: 40
End: 2020-10-30
Payer: COMMERCIAL

## 2020-10-30 DIAGNOSIS — R00.2 PALPITATIONS: ICD-10-CM

## 2020-10-30 DIAGNOSIS — E05.10 TOXIC SOLITARY THYROID NODULE: Primary | ICD-10-CM

## 2020-10-30 PROCEDURE — 99214 OFFICE O/P EST MOD 30 MIN: CPT | Performed by: INTERNAL MEDICINE

## 2020-10-30 RX ORDER — PROPRANOLOL HYDROCHLORIDE 20 MG/1
TABLET ORAL
Qty: 60 TAB | Refills: 11 | Status: SHIPPED | OUTPATIENT
Start: 2020-10-30

## 2020-10-30 NOTE — PROGRESS NOTES
Chief Complaint   Patient presents with    Thyroid Problem     pcp and pharmacy confirmed    Other     [phone] doxy       **THIS IS A VIRTUAL VISIT VIA A VIDEO SYNCHRONOUS DISCUSSION. PATIENT AGREED TO HAVE THEIR CARE DELIVERED OVER A DOXY. ME VIDEO VISIT IN PLACE OF THEIR REGULARLY SCHEDULED OFFICE VISIT**    History of Present Illness: Vania Alfaro is a 45 y.o. female here for follow up of thyroid. Received AMARO in 5/20 and so far hasn't noticed any change in size of her neck but never had any pain. Hasn't been singing the choir due to [employer] so hasn't had a chance to see if her voice is any stronger. Has noticed some palpitations at least every other day lasting for about a minute and has seen her HR climb into the 120-130s. Can have some associated chest pressure and can make her short of breath. No tremors. Mostly stays constipated and no diarrhea. Can fluctuate between feeling hot and cold. Weight is about 290 and was 289 in 4/20. Can have some intermittent fatigue. Did take prednisone for back pain in 8/20 but when she was prescribed a medrol pack in 9/20, she did not take this. Current Outpatient Medications   Medication Sig    ketorolac (TORADOL) 10 mg tablet Take 1 Tab by mouth every six (6) hours as needed for Pain. Indications: excessive pain    cyclobenzaprine (FLEXERIL) 10 mg tablet Take 1 Tab by mouth three (3) times daily as needed for Muscle Spasm(s).  ondansetron (ZOFRAN ODT) 4 mg disintegrating tablet Take 1 Tab by mouth every eight (8) hours as needed for Nausea or Vomiting.  levocetirizine (XYZAL) 5 mg tablet Take 1 Tab by mouth daily as needed for Allergies.  ergocalciferol (ERGOCALCIFEROL) 1,250 mcg (50,000 unit) capsule Take 1 Cap by mouth every twenty-eight (28) days.     SUMAtriptan (IMITREX) 20 mg/actuation nasal spray Use in the nostril opposite to the headache    lidocaine (LIDOCAINE PAIN RELIEF) 4 % patch 1 Patch by TransDERmal route every twenty-four (24) hours. (Patient taking differently: 1 Patch by TransDERmal route as needed.)    triamcinolone (NASACORT AQ) 55 mcg nasal inhaler 2 Sprays daily.  EPINEPHrine (EPIPEN 2-TOO) 0.3 mg/0.3 mL injection by IntraMUSCular route.  albuterol-ipratropium (DUO-NEB) 2.5 mg-0.5 mg/3 ml nebu 3 mL by Nebulization route every six (6) hours as needed (sob, wheee).  aspirin-acetaminophen-caffeine (EXCEDRIN ES) 250-250-65 mg per tablet Take 1 Tab by mouth.  acetaminophen (TYLENOL) 325 mg tablet Take  by mouth every four (4) hours as needed for Pain.  SOD CHLOR,BICARB/SQUEEZ BOTTLE (NASAL RELIEF SINUS WASH-BOTTLE NA) as needed.  Dexlansoprazole (DEXILANT) 60 mg CpDB Take  by mouth daily.  ipratropium (ATROVENT) 0.02 % nebulizer solution 2.5 mL by Nebulization route as needed for Wheezing.  albuterol (PROVENTIL HFA, VENTOLIN HFA) 90 mcg/actuation inhaler Take 1-2 puffs by inhalation every four (4) hours as needed for Wheezing. No current facility-administered medications for this visit. Allergies   Allergen Reactions    Black Pepper Hives, Swelling and Cough    Mushroom Combination No.1 Shortness of Breath, Swelling and Cough    Tomato Hives, Swelling and Cough    Adhesive Tape-Silicones Hives     Causes burning at site    [employer] Hives    Dicyclomine Rash     Pt says not allergic to dicyclomine    Dilaudid [Hydromorphone (Bulk)] Itching    Doxycycline Hives    Metoprolol Shortness of Breath     Not acute sob, but was having these recurrences while on the med that stopped once she d/c it.     Gluten Other (comments)     GI upset    Morphine Rash    Tramadol Rash     Tolerates percocet     Review of Systems: PER HPI    Physical Examination:  - GENERAL: NCAT, Appears well nourished   - EYES: EOMI, non-icteric, no proptosis   - Ear/Nose/Throat: NCAT, no visible inflammation or masses   - CARDIOVASCULAR: no cyanosis, no visible JVD   - RESPIRATORY: respiratory effort normal without any distress or labored breathing   - MUSCULOSKELETAL: Normal ROM of neck and upper extremities observed   - SKIN: No rash on face  - NEUROLOGIC:  No facial asymmetry (Cranial nerve 7 motor function), No gaze palsy   - PSYCHIATRIC: Normal affect, Normal insight and judgement       Data Reviewed:   Component      Latest Ref Rng & Units 10/23/2020 10/23/2020 9/15/2020          12:00 AM 12:00 AM  9:51 AM   TSH      0.450 - 4.500 uIU/mL 0.239 (L)  5.750 (H)   T4, Free      0.82 - 1.77 ng/dL  1.28 0.96       Assessment/Plan:     1. Toxic solitary thyroid nodule: She had a low TSH of 0.27 in 10/18 but all other values in our system have been normal including her most recent value of 1.8 in 3/20. Her ultrasound in 4/20 showed a 2.8 cm left lobe nodule that had increased in size slightly from 2.6 cm in 12/18. This was biopsied in 1/19 and was benign. She had a thyroid uptake scan that showed 36% uptake with diminished activity in this nodule. Even though this constitutes a cold nodule, the fact that she already had this biopsied and it was benign and has not significantly increased in size from 12/18 to 4/20, I don't feel there is any indication to biopsy this again. The likelihood of thyroid cancer in a gland that has increased uptake is very low so she is agreeable to not having another biopsy especially since her first biopsy was very difficult. We discussed that we have 3 options: 1) watchful waiting with following TSH levels over time to see if she goes on to develop clinical hyperthyroidism from this nodule in the future, 2) surgical removal of the nodule, or 3) treatment with radioactive iodine to see if it will shrink her nodule and overall gland size to help with any mild compressive symptoms she is having. I did not feel that surgery was indicated at her visit in 4/20 and she didn't want to pursue this route either. She wanted to try a treatment dose of AMARO and received 25.9 mCi on 5/6/20.   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 Her TSH went high at 5.75 and FT4 down to 0.96 in 9/20 but TSH back low at 0.239 in 10/20. Her level has fluctuated a lot since AMARO so it's unclear if she will remain hyperthyroid from her nodules or go back to normal so will continue to watch her closely over the next 6 months as it sometimes can take 6-12 months to see the full effect of AMARO. - check TSH and free T4 in 6 weeks and prior to next visit       2. Palpitations: likely these are from intermittent release of thyroid hormone from radiation-induced thyroiditis so will use a non-selective beta blocker as needed given her h/o asthma.  - begin propranolol 20 mg bid as needed          Patient Instructions   1) Your TSH (thyroid test) has fluctuated from low (meaning hyperthyroid--too fast) to high (meaning hypothyroid--too slow). Your most recent value was 0.23 which was slightly low. I want to see how this will tend over the coming months to see if you will need any medication for your thyroid or not. 2) I will send propranolol 20 mg tabs to take 1 tab up to twice daily as needed for heart racing. Hopefully you won't need to take too often. 3) I put an order directly into the [email or web address] system (you can go to \[facility]\"") to repeat your labs in 6 weeks and in the 1-2 weeks prior to your next visit so just ask for the order under my name and you will receive a courtesy reminder through [employer] to have these drawn. I may end up checking one other time in between based on your labs in 6 weeks. 4) Please come for a follow up visit on 4/30/21 at 8:50am in our [place] office.                 Follow-up and Dispositions    · Return 4/30/21 at 8:50am.               Copy sent to:  Dali Sotelo, DO    Lab follow up: 2/6/21  Component      Latest Ref Rng & Units 2/2/2021 2/2/2021          12:00 AM 12:00 AM   T4, Free      0.82 - 1.77 ng/dL 0.91   TSH      0.450 - 4.500 uIU/mL 0.429 (L)      Sent her the following message through [employer]:  TSH is a thyroid test.  Your level is 0.429 which is just barely low and below goal of 0.45-2.0. This test goes opposite of your thyroid function and suggests your level is almost back to normal and has improved from 0.239 at your last check so we can continue to watch your thyroid without any medication at this time. Lab follow up: 3/24/21  Component      Latest Ref Rng & Units 3/19/2021 3/19/2021           9:34 AM  9:34 AM   T4, Free      0.8 - 1.5 NG/DL  1.1   TSH      0.36 - 3.74 uIU/mL 8.24 (H)      Sent her the following message through [employer]:  TSH is a thyroid test.  Your level is 8.24 which is high and above goal of 0.5-2.0. This test goes opposite of your thyroid function and suggests you have now developed mild hypothyroidism (slow metabolism) and therefore would benefit from starting a dose of levothyroxine. I will begin levothyroxine 137 mcg daily. Take Levothyroxine either in the morning with just water, at least 30 minutes before breakfast and coffee and all other pills, or take it at bedtime on any empty stomach 2-3 hours after dinner. This must be  from vitamins, calcium, or iron by at least 4 hours. If you ever do miss a dose of levothyroxine, it's okay to take 2 pills the next day to catch up on your dose. The goal is always to get 7 pills per week and even if you have to take 3-4 pills at a time to get caught up on missed doses, this is better than letting your weekly level fall. This will be ready for  at the [facility] pharmacy in [place] today. Plan on having your labs repeated again a few days prior to your visit with me in April.

## 2020-10-30 NOTE — PATIENT INSTRUCTIONS
1) Your TSH (thyroid test) has fluctuated from low (meaning hyperthyroid--too fast) to high (meaning hypothyroid--too slow). Your most recent value was 0.23 which was slightly low. I want to see how this will tend over the coming months to see if you will need any medication for your thyroid or not. 2) I will send propranolol 20 mg tabs to take 1 tab up to twice daily as needed for heart racing. Hopefully you won't need to take too often. 3) I put an order directly into the [facility] system (you can go to \[facility]\"") to repeat your labs in 6 weeks and in the 1-2 weeks prior to your next visit so just ask for the order under my name and you will receive a courtesy reminder through [employer] to have these drawn. I may end up checking one other time in between based on your labs in 6 weeks. 4) Please come for a follow up visit on 4/30/21 at 8:50am in our [address] office.

## 2020-12-11 DIAGNOSIS — R00.2 PALPITATIONS: ICD-10-CM

## 2020-12-11 DIAGNOSIS — E05.10 TOXIC SOLITARY THYROID NODULE: ICD-10-CM

## 2021-01-15 ENCOUNTER — TRANSCRIBE ORDER (OUTPATIENT)
Dept: INTERNAL MEDICINE CLINIC | Age: 41
End: 2021-01-15

## 2021-02-03 LAB
T4 FREE SERPL-MCNC: 0.91 NG/DL (ref 0.82–1.77)
TSH SERPL DL<=0.005 MIU/L-ACNC: 0.43 UIU/ML (ref 0.45–4.5)

## 2021-03-19 ENCOUNTER — OFFICE VISIT (OUTPATIENT)
Dept: FAMILY MEDICINE CLINIC | Age: 41
End: 2021-03-19
Payer: COMMERCIAL

## 2021-03-19 VITALS
TEMPERATURE: 97 F | OXYGEN SATURATION: 99 % | HEART RATE: 88 BPM | HEIGHT: 64 IN | BODY MASS INDEX: 50.02 KG/M2 | SYSTOLIC BLOOD PRESSURE: 150 MMHG | WEIGHT: 293 LBS | DIASTOLIC BLOOD PRESSURE: 78 MMHG | RESPIRATION RATE: 20 BRPM

## 2021-03-19 DIAGNOSIS — R06.09 DYSPNEA ON EXERTION: ICD-10-CM

## 2021-03-19 DIAGNOSIS — G25.81 RESTLESS LEG SYNDROME: ICD-10-CM

## 2021-03-19 DIAGNOSIS — Z12.31 SCREENING MAMMOGRAM, ENCOUNTER FOR: ICD-10-CM

## 2021-03-19 DIAGNOSIS — E55.9 VITAMIN D DEFICIENCY: ICD-10-CM

## 2021-03-19 DIAGNOSIS — Z20.822 EXPOSURE TO COVID-19 VIRUS: ICD-10-CM

## 2021-03-19 DIAGNOSIS — E53.8 B12 DEFICIENCY: ICD-10-CM

## 2021-03-19 DIAGNOSIS — E11.42 DIABETIC PERIPHERAL NEUROPATHY ASSOCIATED WITH TYPE 2 DIABETES MELLITUS (HCC): ICD-10-CM

## 2021-03-19 DIAGNOSIS — J45.909 ASTHMA, INTRINSIC: ICD-10-CM

## 2021-03-19 DIAGNOSIS — D50.8 IRON DEFICIENCY ANEMIA SECONDARY TO INADEQUATE DIETARY IRON INTAKE: ICD-10-CM

## 2021-03-19 DIAGNOSIS — E03.4 HYPOTHYROIDISM DUE TO ACQUIRED ATROPHY OF THYROID: ICD-10-CM

## 2021-03-19 DIAGNOSIS — R79.0 LOW FERRITIN: ICD-10-CM

## 2021-03-19 DIAGNOSIS — R06.02 SHORTNESS OF BREATH: ICD-10-CM

## 2021-03-19 DIAGNOSIS — R07.2 PRECORDIAL PAIN: ICD-10-CM

## 2021-03-19 DIAGNOSIS — Z98.891 HISTORY OF CESAREAN SECTION, CLASSICAL: ICD-10-CM

## 2021-03-19 DIAGNOSIS — R10.9 ACUTE RIGHT FLANK PAIN: Primary | ICD-10-CM

## 2021-03-19 LAB
BILIRUB UR QL STRIP: NEGATIVE
GLUCOSE UR-MCNC: NEGATIVE MG/DL
KETONES P FAST UR STRIP-MCNC: NEGATIVE MG/DL
PH UR STRIP: 5.5 [PH] (ref 4.6–8)
PROT UR QL STRIP: NEGATIVE
SP GR UR STRIP: 1.02 (ref 1–1.03)
UA UROBILINOGEN AMB POC: NORMAL (ref 0.2–1)
URINALYSIS CLARITY POC: CLEAR
URINALYSIS COLOR POC: YELLOW
URINE BLOOD POC: NEGATIVE
URINE LEUKOCYTES POC: NEGATIVE
URINE NITRITES POC: NEGATIVE

## 2021-03-19 PROCEDURE — 81003 URINALYSIS AUTO W/O SCOPE: CPT | Performed by: INTERNAL MEDICINE

## 2021-03-19 PROCEDURE — 93000 ELECTROCARDIOGRAM COMPLETE: CPT | Performed by: INTERNAL MEDICINE

## 2021-03-19 PROCEDURE — 99214 OFFICE O/P EST MOD 30 MIN: CPT | Performed by: INTERNAL MEDICINE

## 2021-03-19 RX ORDER — CHLORTHALIDONE 25 MG/1
25 TABLET ORAL DAILY
Qty: 30 TAB | Refills: 1 | Status: SHIPPED | OUTPATIENT
Start: 2021-03-19 | End: 2021-04-10 | Stop reason: SDUPTHER

## 2021-03-19 RX ORDER — SUMATRIPTAN 20 MG/1
SPRAY NASAL
Qty: 1 CONTAINER | Refills: 2 | Status: SHIPPED | OUTPATIENT
Start: 2021-03-19

## 2021-03-19 RX ORDER — PREDNISONE 10 MG/1
TABLET ORAL
Qty: 21 TAB | Refills: 0 | Status: SHIPPED | OUTPATIENT
Start: 2021-03-19 | End: 2021-04-30

## 2021-03-19 RX ORDER — LEVOCETIRIZINE DIHYDROCHLORIDE 5 MG/1
5 TABLET, FILM COATED ORAL
Qty: 30 TAB | Refills: 1 | Status: SHIPPED | OUTPATIENT
Start: 2021-03-19 | End: 2021-04-10 | Stop reason: SDUPTHER

## 2021-03-19 RX ORDER — CEPHALEXIN 500 MG/1
500 CAPSULE ORAL 3 TIMES DAILY
Qty: 21 CAP | Refills: 0 | Status: SHIPPED | OUTPATIENT
Start: 2021-03-19 | End: 2021-03-26

## 2021-03-19 NOTE — PROGRESS NOTES
1. Have you been to the ER, urgent care clinic since your last visit? Hospitalized since your last visit? No    2. Have you seen or consulted any other health care providers outside of the [address] [place] [address] since your last visit? Include any pap smears or colon screening.  No         Learning Assessment 8/6/2020   PRIMARY LEARNER Patient   HIGHEST LEVEL OF EDUCATION - PRIMARY LEARNER  SOME COLLEGE   BARRIERS PRIMARY LEARNER NONE   CO-LEARNER CAREGIVER No   PRIMARY LANGUAGE ENGLISH   LEARNER PREFERENCE PRIMARY READING     -     -   LEARNING SPECIAL TOPICS -   ANSWERED BY Patient   RELATIONSHIP SELF

## 2021-03-19 NOTE — PROGRESS NOTES
Harry Kirkland is a 37 y.o. female who presents to the office today with the following:  Chief Complaint   Patient presents with    Headache-presents today with a headache and wonders if it may be related to her blood pressure versus sinuses. Has been mainly frontal, over the right eye mainly when she feels like it is related to the sinuses, but otherwise will be all over her head. Feels like she has had some vision changes with blurriness and spots and is tried no spray previously which \"burned\" her nose, as well as a sinus rinse and some Excedrin and Tylenol. Pressure relief with over-the-counter medications. She notes when she had the headache 1 day her blood pressure was 150/109 which is unusual for her. No nausea, vomiting, fevers, chills, chest pressure or tightness, shortness of breath or difficulty breathing at that time. She has noted some shortness of breath at other times and is concerned that this may be related to fluid. She has noted that her weight seems to be going up and down daily over a period of time. She also has had itching she feels like has been a daily phenomenon.  Blood Pressure Check-would like to check her blood pressure as it was elevated the other day and she wanted to make sure that this was not still the case.  Flank Pain-she has been having right-sided pain in the \"kidney area\" that does feel like it radiates around her side. She has tried stretching, ice, Tylenol, Flexeril. She has had this pain ever since she had her pneumonia. She has not had any hematuria, pyuria, dysuria. She does not feel like it worsens when she takes a deep breath in.  Certain positions tend to make it more painful/achy. Right. Has been following up with her endocrinologist and monitoring her thyroid. Currently not on any thyroid medication.     Allergies   Allergen Reactions    Black Pepper Hives, Swelling and Cough    Mushroom Combination No.1 Shortness of Breath, Swelling and Cough    Tomato Hives, Swelling and Cough    Adhesive Tape-Silicones Hives     Causes burning at site    [employer] Containing Products Hives    Bovine Cartilage Hives    Dicyclomine Rash     Pt says not allergic to dicyclomine    Dilaudid [Hydromorphone (Bulk)] Itching    Doxycycline Hives    Metoprolol Shortness of Breath     Not acute sob, but was having these recurrences while on the med that stopped once she d/c it.  Gluten Other (comments)     GI upset    Morphine Rash    Tramadol Rash     Tolerates percocet       Current Outpatient Medications   Medication Sig    predniSONE (STERAPRED DS) 10 mg dose pack See administration instruction per 10mg dose pack    levocetirizine (XYZAL) 5 mg tablet Take 1 Tab by mouth daily as needed for Allergies.  SUMAtriptan (IMITREX) 20 mg/actuation nasal spray Use in the nostril opposite to the headache    cephALEXin (KEFLEX) 500 mg capsule Take 1 Cap by mouth three (3) times daily for 7 days.  chlorthalidone (HYGROTON) 25 mg tablet Take 1 Tab by mouth daily. Indications: visible water retention, high blood pressure    albuterol-ipratropium (DUO-NEB) 2.5 mg-0.5 mg/3 ml nebu 3 mL by Nebulization route every six (6) hours as needed (sob, wheee).  albuterol (PROVENTIL HFA, VENTOLIN HFA, PROAIR HFA) 90 mcg/actuation inhaler Take 1-2 Puffs by inhalation every four (4) hours as needed for Wheezing.  propranoloL (INDERAL) 20 mg tablet Take 1 tab up to twice daily as needed for heart racing    cyclobenzaprine (FLEXERIL) 10 mg tablet Take 1 Tab by mouth three (3) times daily as needed for Muscle Spasm(s).  ondansetron (ZOFRAN ODT) 4 mg disintegrating tablet Take 1 Tab by mouth every eight (8) hours as needed for Nausea or Vomiting.  ergocalciferol (ERGOCALCIFEROL) 1,250 mcg (50,000 unit) capsule Take 1 Cap by mouth every twenty-eight (28) days.  lidocaine (LIDOCAINE PAIN RELIEF) 4 % patch 1 Patch by TransDERmal route every twenty-four (24) hours. (Patient taking differently: 1 Patch by TransDERmal route as needed.)    triamcinolone (NASACORT AQ) 55 mcg nasal inhaler 2 Sprays daily.  EPINEPHrine (EPIPEN 2-TOO) 0.3 mg/0.3 mL injection by IntraMUSCular route.  acetaminophen (TYLENOL) 325 mg tablet Take  by mouth every four (4) hours as needed for Pain.  SOD CHLOR,BICARB/SQUEEZ BOTTLE (NASAL RELIEF SINUS WASH-BOTTLE NA) as needed.  Dexlansoprazole (DEXILANT) 60 mg CpDB Take  by mouth daily.  ipratropium (ATROVENT) 0.02 % nebulizer solution 2.5 mL by Nebulization route as needed for Wheezing.  levothyroxine (SYNTHROID) 137 mcg tablet Take 137 mcg by mouth Daily (before breakfast). No current facility-administered medications for this visit.         Past Medical History:   Diagnosis Date    Anemia NEC     Arthritis     Asthma     Bell's palsy 2018    Chronic pain     fibromyalgia    Colitis     GERD (gastroesophageal reflux disease)     Hypertension     diastolic    Ill-defined condition     ACL repair on L leg     Other unknown and unspecified cause of morbidity or mortality     fibromyalgia    Other unknown and unspecified cause of morbidity or mortality     obesity    Pleural effusion associated with pulmonary infection     Pre-diabetes 2018    Psychiatric problem     anxiety    PUD (peptic ulcer disease)     Thyroid disease during pregnancy     hyperthyroid    Toxic thyroid nodule     s/p AMARO with 25 mCi on 20       Past Surgical History:   Procedure Laterality Date     DELIVERY ONLY      x2, one with classical uterine incision    HX  SECTION      x3 total    HX CHOLECYSTECTOMY      HX HEENT      sinus surgery    HX HERNIA REPAIR      HX ORTHOPAEDIC      ACL repair x2 - twice       Social History     Socioeconomic History    Marital status:      Spouse name: Not on file    Number of children: Not on file    Years of education: Not on file    Highest education level: Not on file   Tobacco Use    Smoking status: Never Smoker    Smokeless tobacco: Never Used   Substance and Sexual Activity    Alcohol use: Yes     Comment: once a month    Drug use: No    Sexual activity: Yes     Partners: Male     Birth control/protection: None   Social History Narrative    Lives in [facility] with  and 6 kids (5 boys and 1 girl). 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 Likes to swim. Social History     Tobacco Use   Smoking Status Never Smoker   Smokeless Tobacco Never Used       Family History   Problem Relation Age of Onset    Diabetes Father     Heart Attack Father         62 smoker    Hypertension Father     Asthma Father     High Cholesterol Father     Stroke Father     Rashes/Skin Problems Father         eczema    Heart Disease Father     Hypertension Mother     Asthma Mother     Hypertension Maternal Grandmother     Stroke Maternal Grandmother     Heart Disease Maternal Grandfather     Diabetes Paternal Grandmother     Dementia Paternal Grandfather     Diabetes Brother     Diabetes Sister     Other Sister         ankylosing spondylitis    Hypertension Sister     Other Sister         ankylosing spondylitis    Hypertension Brother     Asthma Daughter     Asthma Son     Asthma Son     Asthma Son     Asthma Son     Asthma Son        Vitals:    03/19/21 0852   BP: (!) 150/78   BP 1 Location: Left upper arm   BP Patient Position: Sitting   BP Cuff Size: Large adult   Pulse: 88   Resp: 20   Temp: 97 °F (36.1 °C)   TempSrc: Core   SpO2: 99%   Weight: 293 lb 3.2 oz (133 kg)   Height: 5' 4\" (1.626 m)         3 most recent PHQ Screens 3/19/2021   Little interest or pleasure in doing things Not at all   Feeling down, depressed, irritable, or hopeless Not at all   Total Score PHQ 2 0       ROS:  Denies fever, chills, cough, chest pain or pressure, SOB/WAYLON,  nausea, vomiting, or diarrhea/constipation.   No changes in vision or hearing. No new or worsening headaches. No edema, no claudication. Denies wt loss, wt gain, hemoptysis, hematochezia or melena. No dysuria, pyuria, frequency. No polydipsia, polyuria. No new weakness, arthralgias, myalgias. No weakness, dizziness, lightheadedness, numbness or tingling. No recent changes in moods. Physical Examination:    GENERAL APPEARANCE: NAD, appears stated age, comfortable  VITAL SIGNS:   Visit Vitals  BP (!) 150/78 (BP 1 Location: Left upper arm, BP Patient Position: Sitting, BP Cuff Size: Large adult)   Pulse 88   Temp 97 °F (36.1 °C) (Core)   Resp 20   Ht 5' 4\" (1.626 m)   Wt 293 lb 3.2 oz (133 kg)   SpO2 99%   BMI 50.33 kg/m²     HEENT: Normocephalic and atraumatic. No scleral icterus. Pupils are equal, round, and reactive to light and accommodation. No conjunctival injection is noted. NECK: Supple. . No thyroid nodularity or enlargement. No lymphadenopathy or tenderness. LUNGS: Breath sounds are equal and clear bilaterally. No wheezes, rhonchi, or rales. HEART: Regular rate and rhythm with normal S1 and S2. No murmurs, gallops, or rubs. ABDOMEN: Soft. Negative Joel sign  EXTREMITIES: No cyanosis, clubbing, or edema. No calf pain to palpation. NEUROLOGIC: CN II-XII intact; no focal neurological deficits. PSYCHIATRIC: The patient is awake, alert, and oriented x3. Recent and remote memory is intact. Appropriate mood and affect. SKIN: Warm, dry, and well perfused. Good turgor. No lesions, nodules or rashes are noted. ASSESSMENT AND PLAN:    1. Headache-discussed that headache can be either the cause of resulting from high blood pressure. Continue to monitor blood pressures at home and we will add chlorthalidone. Cautioned for medication overuse headache. As well, if it is secondary to sinuses, combination of Xyzal, Flonase or other nasal spray for congestion may help. Can also use warm compresses on the face.     2.  Blood pressure elevation-elevation on multiple readings with concern for stage II hypertension. We will start with chlorthalidone 25 mg, work-up for hyperaldosteronism, follow-up with echocardiogram.  EKG done today with changes suggestive of left ventricular hypertrophy. Weight fluctuations can also be contributed to fluid overload so assessing heart for diastolic or systolic dysfunction will help elucidate if there is a component of heart failure    3. Shortness of breath-in addition to echocardiogram to work-up the heart, we will also do pulmonary function tests with DLCO. Patient did have COVID-19, may be continuing to suffer effects from the illness itself. Continue with albuterol every 4 hours scheduled when having a flare. She also has DuoNeb at home for nebulizer which can be used in its place as needed for shortness of breath. 4.  Right-sided \"kidney area\" pain. POC urine negative at this time. Appears to be more musculoskeletal in origin but we will also get an x-ray due to the fact that she had COVID-19 pneumonia and there may be remnants that are causing pain in this area. We will also get PFTs. Encouraged heat, stretching, consider physical therapy, NSAIDs/over-the-counter Tylenol and topical analgesics for pain. 5.  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 IgG antibody ordered. Health maintenance: Mammogram ordered, follow-up for Pap. Orders Placed This Encounter    MAMMOGRAM SCREENING - BILATERAL     Standing Status:   Future     Standing Expiration Date:   9/19/2021     Order Specific Question:   Is Patient Pregnant? Answer:   No     Order Specific Question:   Reason for Exam     Answer:   Breast cancer screening    XR CHEST PA LAT     Standing Status:   Future     Standing Expiration Date:   4/19/2022     Order Specific Question:   Is Patient Pregnant?      Answer:   No     Order Specific Question:   Reason for Exam     Answer:   right flank/back pain after pneumonia     Order Specific Question:   Which facility to perform procedure? Answer:   [facility]    CBC WITH AUTOMATED DIFF     Standing Status:   Future     Number of Occurrences:   1     Standing Expiration Date:   0/77/0055    METABOLIC PANEL, COMPREHENSIVE     Standing Status:   Future     Number of Occurrences:   1     Standing Expiration Date:   3/19/2022    LIPID PANEL     Standing Status:   Future     Number of Occurrences:   1     Standing Expiration Date:   3/19/2022    HEMOGLOBIN A1C WITH EAG     Standing Status:   Future     Number of Occurrences:   1     Standing Expiration Date:   3/19/2022    VITAMIN D, 25 HYDROXY     Standing Status:   Future     Number of Occurrences:   1     Standing Expiration Date:   3/19/2022    TSH 3RD GENERATION     Standing Status:   Future     Number of Occurrences:   1     Standing Expiration Date:   3/19/2022    T4, FREE     Standing Status:   Future     Number of Occurrences:   1     Standing Expiration Date:   3/19/2022    ALDOSTERONE/RENIN ACTIVITY     Standing Status:   Future     Number of Occurrences:   1     Standing Expiration Date:   3/19/2022    ALDOSTERONE/RENIN RATIO     Standing Status:   Future     Number of Occurrences:   1     Standing Expiration Date:   3/19/2022    ALDOSTERONE     Standing Status:   Future     Number of Occurrences:   1     Standing Expiration Date:   3/19/2022    PROLACTIN     Standing Status:   Future     Number of Occurrences:   1     Standing Expiration Date:   3/19/2022    SARS-COV-2 AB, IGG ([facility] Default)     Standing Status:   Future     Number of Occurrences:   1     Standing Expiration Date:   9/19/2021     Scheduling Instructions:      CDC does not recommend using antibody testing to diagnose acute infection. It is recommended to use a viral antigen test to diagnose acute infection. See order NOVEL CORONAVIRUS (COVID-19) - [id number].             Antibody tests are not 100% accurate, and some false-positive results or false-negative results may occur. A positive result may not ensure immunity from reinfection. Per CDC, results do not confirm whether or not a patient is able to spread the virus that causes COVID-19. Order Specific Question:   Is this test for diagnosis or screening? Answer:   Diagnosis of ill patient     Order Specific Question:   Symptomatic for COVID-19 as defined by CDC? Answer:   Unknown     Order Specific Question:   Hospitalized for COVID-19? Answer:   No     Order Specific Question:   Admitted to ICU for COVID-19? Answer:   No     Order Specific Question:   Employed in healthcare setting? Answer:   Yes     Order Specific Question:   Resident in a congregate (group) care setting? Answer:   No     Order Specific Question:   Pregnant? Answer:   No     Order Specific Question:   Previously tested for COVID-19? Answer:    Yes    VITAMIN B12     Standing Status:   Future     Number of Occurrences:   1     Standing Expiration Date:   3/19/2022    MAGNESIUM     Standing Status:   Future     Number of Occurrences:   1     Standing Expiration Date:   3/19/2022    FERRITIN     Standing Status:   Future     Number of Occurrences:   1     Standing Expiration Date:   3/19/2022    AMB POC URINALYSIS DIP STICK AUTO W/O MICRO     AMB POC URINALYSIS DIP STICK AUTO W/O    AMB POC EKG ROUTINE W/ 12 LEADS, INTER & REP     Order Specific Question:   Reason for Exam:     Answer:   short of breath    PULMONARY FUNCTION TEST     Standing Status:   Future     Standing Expiration Date:   9/19/2021    PFT DLCO     Standing Status:   Future     Standing Expiration Date:   9/19/2021     Scheduling Instructions:      [facility] if they can do DLCO - if not, will need to send to [place]    PULMONARY FUNCTION TEST     Standing Status:   Future     Standing Expiration Date:   9/19/2021     Scheduling Instructions:      [facility] if they can do DLCO - if not, will need to send to [address] [place] [address]     Standing Status:   Future     Number of Occurrences:   1     Standing Expiration Date:   9/19/2021    predniSONE (STERAPRED DS) 10 mg dose pack     Sig: See administration instruction per 10mg dose pack     Dispense:  21 Tab     Refill:  0    levocetirizine (XYZAL) 5 mg tablet     Sig: Take 1 Tab by mouth daily as needed for Allergies. Dispense:  30 Tab     Refill:  1    SUMAtriptan (IMITREX) 20 mg/actuation nasal spray     Sig: Use in the nostril opposite to the headache     Dispense:  1 Container     Refill:  2    cephALEXin (KEFLEX) 500 mg capsule     Sig: Take 1 Cap by mouth three (3) times daily for 7 days. Dispense:  21 Cap     Refill:  0    chlorthalidone (HYGROTON) 25 mg tablet     Sig: Take 1 Tab by mouth daily. Indications: visible water retention, high blood pressure     Dispense:  30 Tab     Refill:  1       Patient to return PRN for any new symptoms, call/come to clinic if notices any side effects from medication or any new side effects, and return for regularly scheduled visit    Patient verbalized understanding of and agreement to treatment plan. Patient has been advised to contact practice or seek care if condition persists or worsens. Radha Herron DO      This documentation was facilitated by voice recognition software and may contain inadvertent typographical errors. Please note that this dictation was completed with [employer], the computer voice recognition software. Quite often unanticipated grammatical, syntax, homophones, and other interpretive errors are inadvertently transcribed by the computer software. Please disregard these errors. Please excuse any errors that have escaped final proofreading.

## 2021-03-20 LAB
25(OH)D3 SERPL-MCNC: 25.3 NG/ML (ref 30–100)
ALBUMIN SERPL-MCNC: 3.7 G/DL (ref 3.5–5)
ALBUMIN/GLOB SERPL: 1 {RATIO} (ref 1.1–2.2)
ALP SERPL-CCNC: 85 U/L (ref 45–117)
ALT SERPL-CCNC: 24 U/L (ref 12–78)
ANION GAP SERPL CALC-SCNC: 8 MMOL/L (ref 5–15)
AST SERPL-CCNC: 13 U/L (ref 15–37)
BASOPHILS # BLD: 0.1 K/UL (ref 0–0.1)
BASOPHILS NFR BLD: 1 % (ref 0–1)
BILIRUB SERPL-MCNC: 0.6 MG/DL (ref 0.2–1)
BUN SERPL-MCNC: 10 MG/DL (ref 6–20)
BUN/CREAT SERPL: 13 (ref 12–20)
CALCIUM SERPL-MCNC: 9.3 MG/DL (ref 8.5–10.1)
CHLORIDE SERPL-SCNC: 102 MMOL/L (ref 97–108)
CHOLEST SERPL-MCNC: 179 MG/DL
CO2 SERPL-SCNC: 30 MMOL/L (ref 21–32)
CREAT SERPL-MCNC: 0.8 MG/DL (ref 0.55–1.02)
DIFFERENTIAL METHOD BLD: ABNORMAL
EOSINOPHIL # BLD: 0.2 K/UL (ref 0–0.4)
EOSINOPHIL NFR BLD: 3 % (ref 0–7)
ERYTHROCYTE [DISTWIDTH] IN BLOOD BY AUTOMATED COUNT: 14.9 % (ref 11.5–14.5)
EST. AVERAGE GLUCOSE BLD GHB EST-MCNC: 134 MG/DL
FERRITIN SERPL-MCNC: 40 NG/ML (ref 26–388)
GLOBULIN SER CALC-MCNC: 3.6 G/DL (ref 2–4)
GLUCOSE SERPL-MCNC: 140 MG/DL (ref 65–100)
HBA1C MFR BLD: 6.3 % (ref 4–5.6)
HCT VFR BLD AUTO: 38.1 % (ref 35–47)
HDLC SERPL-MCNC: 45 MG/DL
HDLC SERPL: 4 {RATIO} (ref 0–5)
HGB BLD-MCNC: 12 G/DL (ref 11.5–16)
IMM GRANULOCYTES # BLD AUTO: 0 K/UL (ref 0–0.04)
IMM GRANULOCYTES NFR BLD AUTO: 0 % (ref 0–0.5)
LDLC SERPL CALC-MCNC: 92.2 MG/DL (ref 0–100)
LIPID PROFILE,FLP: ABNORMAL
LYMPHOCYTES # BLD: 2.3 K/UL (ref 0.8–3.5)
LYMPHOCYTES NFR BLD: 28 % (ref 12–49)
MAGNESIUM SERPL-MCNC: 1.8 MG/DL (ref 1.6–2.4)
MCH RBC QN AUTO: 26.6 PG (ref 26–34)
MCHC RBC AUTO-ENTMCNC: 31.5 G/DL (ref 30–36.5)
MCV RBC AUTO: 84.5 FL (ref 80–99)
MONOCYTES # BLD: 0.4 K/UL (ref 0–1)
MONOCYTES NFR BLD: 5 % (ref 5–13)
NEUTS SEG # BLD: 5.3 K/UL (ref 1.8–8)
NEUTS SEG NFR BLD: 63 % (ref 32–75)
NRBC # BLD: 0 K/UL (ref 0–0.01)
NRBC BLD-RTO: 0 PER 100 WBC
PLATELET # BLD AUTO: 236 K/UL (ref 150–400)
PMV BLD AUTO: 12.6 FL (ref 8.9–12.9)
POTASSIUM SERPL-SCNC: 3.6 MMOL/L (ref 3.5–5.1)
PROLACTIN SERPL-MCNC: 9.1 NG/ML
PROT SERPL-MCNC: 7.3 G/DL (ref 6.4–8.2)
RBC # BLD AUTO: 4.51 M/UL (ref 3.8–5.2)
SARS-COV-2 TOTAL ANTIBODY, CVTOT: REACTIVE
SODIUM SERPL-SCNC: 140 MMOL/L (ref 136–145)
T4 FREE SERPL-MCNC: 1.1 NG/DL (ref 0.8–1.5)
TRIGL SERPL-MCNC: 209 MG/DL (ref ?–150)
TSH SERPL DL<=0.05 MIU/L-ACNC: 8.24 UIU/ML (ref 0.36–3.74)
VIT B12 SERPL-MCNC: 260 PG/ML (ref 193–986)
VLDLC SERPL CALC-MCNC: 41.8 MG/DL
WBC # BLD AUTO: 8.3 K/UL (ref 3.6–11)

## 2021-03-21 NOTE — PROGRESS NOTES
Iron storage is on the low end of normal, you may benefit from a daily supplementation with oral iron. Vitamin B 12 is in normal range but on the low side of normal so you can also do a supplement. Hemoglobin A-1 C is 6.3 which is in the pre-diabetic range. Concentrate reducing the simple sugars and hydrate and your diet. Triglycerides are mildly elevated but otherwise your cholesterol levels were looking great! Keep up the good work!

## 2021-03-21 NOTE — PROGRESS NOTES
Vitamin D levels are low. I will send in a high dose vitamin  d therapy take one tablet once a week for eight weeks. If you would like, just let us know which pharmacy to send it!

## 2021-03-21 NOTE — PROGRESS NOTES
TSH is currently under active, which means that you are trending toward more hypothyroid. I believe you follow up with an endocrinologist? We can repeat the lab along with a thyroxine peroxidase level to see if this may continue trending toward hypothyroidism.

## 2021-03-21 NOTE — PROGRESS NOTES
Positive for coronavirus igg, which means that you did have the infection at some point in the past.

## 2021-03-22 ENCOUNTER — TELEPHONE (OUTPATIENT)
Dept: FAMILY MEDICINE CLINIC | Age: 41
End: 2021-03-22

## 2021-03-22 NOTE — TELEPHONE ENCOUNTER
----- Message from Kallie Anthony sent at 3/22/2021 10:14 AM EDT -----  Regarding: Dr. Fer Goetz  Patient return call    Caller's first and last name and relationship (if not the patient): pt       Best contact number(s): [phone]       Whose call is being returned: unknown      Details to clarify the request: Pt is unsure of reason for call      Kallie Anthony

## 2021-03-24 RX ORDER — LEVOTHYROXINE SODIUM 137 UG/1
137 TABLET ORAL
Qty: 90 TAB | Refills: 3 | Status: SHIPPED | OUTPATIENT
Start: 2021-03-24 | End: 2021-04-30

## 2021-03-29 LAB
ALDOST SERPL-MCNC: 12.9 NG/DL (ref 0–30)
ALDOST SERPL-MCNC: 13.8 NG/DL (ref 0–30)
ALDOST SERPL-MCNC: 14.3 NG/DL (ref 0–30)
ALDOST/RENIN PLAS-RTO: 23.7 {RATIO} (ref 0–30)
RENIN PLAS-CCNC: 0.59 NG/ML/HR (ref 0.17–5.38)
RENIN PLAS-CCNC: 0.6 NG/ML/HR (ref 0.17–5.38)
SPECIMEN SOURCE: NORMAL

## 2021-03-30 NOTE — PROGRESS NOTES
Results and comments sent through my chart to patient. The renin aldosterone ratio is not as elevated as it was 1 year ago, and I checked this to see it was elevated because this can influence blood pressure, As well as potassium balance. If the blood pressure starts to become more difficult to control, we may need to retest this number in the future. Hope you are doing well!

## 2021-04-06 ENCOUNTER — TELEPHONE (OUTPATIENT)
Dept: FAMILY MEDICINE CLINIC | Age: 41
End: 2021-04-06

## 2021-04-06 NOTE — TELEPHONE ENCOUNTER
----- Message from Kaci Morales sent at 4/6/2021  9:26 AM EDT -----  Regarding: /Telephone    General Message/Vendor Calls    Caller's first and last name: Self      Reason for call: Pt looking for pulmonary and mammogram orders location      Callback required yes/no and why: Yes      Best contact number(s): [phone]      Details to clarify the request: Pt would like to know where her Pulmonary test and mammogram orders were sent.        Kaci Morales

## 2021-04-07 NOTE — TELEPHONE ENCOUNTER
Per BRITTON Fuentes, orders have been faxed to [facility] Drs. Called and left message for pt.  KT

## 2021-04-07 NOTE — TELEPHONE ENCOUNTER
Can we see where these are scheduled? I have them under procedures and cardiology as both being active orders. The preference was for these to be done in [facility] if they can do a DLCO on the PFTs but if not have it done in [place]. Thanks!

## 2021-04-10 RX ORDER — CHLORTHALIDONE 25 MG/1
25 TABLET ORAL DAILY
Qty: 30 TAB | Refills: 1 | Status: SHIPPED | OUTPATIENT
Start: 2021-04-10 | End: 2021-04-26 | Stop reason: SDUPTHER

## 2021-04-10 RX ORDER — LEVOCETIRIZINE DIHYDROCHLORIDE 5 MG/1
5 TABLET, FILM COATED ORAL
Qty: 30 TAB | Refills: 1 | Status: SHIPPED | OUTPATIENT
Start: 2021-04-10 | End: 2021-06-29 | Stop reason: ALTCHOICE

## 2021-04-16 DIAGNOSIS — R00.2 PALPITATIONS: ICD-10-CM

## 2021-04-16 DIAGNOSIS — E05.10 TOXIC SOLITARY THYROID NODULE: ICD-10-CM

## 2021-04-26 RX ORDER — CHLORTHALIDONE 25 MG/1
25 TABLET ORAL DAILY
Qty: 90 TAB | Refills: 3 | Status: SHIPPED | OUTPATIENT
Start: 2021-04-26

## 2021-04-28 ENCOUNTER — HOSPITAL ENCOUNTER (OUTPATIENT)
Dept: LAB | Age: 41
Discharge: HOME | End: 2021-04-28

## 2021-04-28 ENCOUNTER — TELEPHONE (OUTPATIENT)
Dept: FAMILY MEDICINE CLINIC | Age: 41
End: 2021-04-28

## 2021-04-28 NOTE — TELEPHONE ENCOUNTER
----- Message from Dariusz Mendoza sent at 4/28/2021  3:41 PM EDT -----  Regarding: [facility]/Telephone  General Message/Vendor Calls    Caller's first and last name:n/a      Reason for call: requesting copy of orders for a mammogram, ego gram, pulmanary function test        Callback required yes/no and why:yes      Best contact number(s): [phone]      Details to clarify the request: [place] [place] [address].

## 2021-04-29 NOTE — TELEPHONE ENCOUNTER
Sp/w pt, she is requesting copies of orders for mammo, PFT's, and Echo. Printed and placed in an envelope, at the  for .  KT

## 2021-04-30 ENCOUNTER — VIRTUAL VISIT (OUTPATIENT)
Dept: ENDOCRINOLOGY | Age: 41
End: 2021-04-30
Payer: COMMERCIAL

## 2021-04-30 DIAGNOSIS — E89.0 POSTABLATIVE HYPOTHYROIDISM: Primary | ICD-10-CM

## 2021-04-30 LAB
T4 FREE SERPL-MCNC: 1.44 NG/DL (ref 0.82–1.77)
TSH SERPL DL<=0.005 MIU/L-ACNC: 3.6 UIU/ML (ref 0.45–4.5)

## 2021-04-30 PROCEDURE — 99214 OFFICE O/P EST MOD 30 MIN: CPT | Performed by: INTERNAL MEDICINE

## 2021-04-30 RX ORDER — LEVOTHYROXINE SODIUM 150 UG/1
150 TABLET ORAL
Qty: 90 TAB | Refills: 3 | Status: SHIPPED | OUTPATIENT
Start: 2021-04-30 | End: 2021-06-29 | Stop reason: SDUPTHER

## 2021-04-30 RX ORDER — BISMUTH SUBSALICYLATE 262 MG
1 TABLET,CHEWABLE ORAL DAILY
COMMUNITY

## 2021-04-30 RX ORDER — LANOLIN ALCOHOL/MO/W.PET/CERES
1000 CREAM (GRAM) TOPICAL DAILY
COMMUNITY

## 2021-04-30 NOTE — PATIENT INSTRUCTIONS
1) TSH is a thyroid test.  Your level is 3.6 which is normal but above goal of 0.45-2.0. This test goes opposite of your thyroid dose and suggests your dose of levothyroxine is not enough. I will increase your dose to 150 mcg daily and have sent a new prescription to your local pharmacy. 2) I will e-mail 2 lab slips for 6 weeks and for 6 months next week. They will come from an e-mail [email or web address] and all the e-mail will have is just an attached image with the PDF. Please confirm you received this and check your junk mail just in case you don't see it in your regular e-mail. Thanks. 3) If you end up going to [facility] for your labs, please let me know so I can be sure that I received the results via fax. 4) Please come for a follow up visit on 11/5/21 at 11:30am in our [place] office.

## 2021-04-30 NOTE — PROGRESS NOTES
Chief Complaint   Patient presents with    Thyroid Problem     [phone] doxy    Other     pcp and pharmacy confirmed       **THIS IS A VIRTUAL VISIT VIA A VIDEO SYNCHRONOUS DISCUSSION. PATIENT AGREED TO HAVE THEIR CARE DELIVERED OVER A DOXY. ME VIDEO VISIT IN PLACE OF THEIR REGULARLY SCHEDULED OFFICE VISIT**    History of Present Illness: Дмитрий Payne is a 37 y.o. female here for follow up of thyroid. Tested negative for COVID in 1/21 but her x-ray looked suspicious for COVID PNA and then her antibody was positive in 3/21. Still has some shortness of breath and dizziness that have lingered. Has been taking the levothyroxine 137 mcg every morning at least 30 min before food with just water. Has missed a few doses but doubled up the next day. Her mvi and B12 are at night. Last week was starting to have more energy but the past few days has felt more tired. Bowels tend to fluctuate and has been more constipated the past 2 weeks and this is causing some nausea and is taking miralax and stool softener. Has had some dry skin and cold intolerance. No hair loss. Weight is currently 300 up from 293 in 3/21. Current Outpatient Medications   Medication Sig    chlorthalidone (HYGROTON) 25 mg tablet Take 1 Tab by mouth daily. Indications: visible water retention, high blood pressure    levocetirizine (XYZAL) 5 mg tablet TAKE 1 TAB BY MOUTH DAILY AS NEEDED FOR ALLERGIES.  levothyroxine (SYNTHROID) 137 mcg tablet Take 137 mcg by mouth Daily (before breakfast).  SUMAtriptan (IMITREX) 20 mg/actuation nasal spray Use in the nostril opposite to the headache    albuterol-ipratropium (DUO-NEB) 2.5 mg-0.5 mg/3 ml nebu 3 mL by Nebulization route every six (6) hours as needed (sob, wheee).  albuterol (PROVENTIL HFA, VENTOLIN HFA, PROAIR HFA) 90 mcg/actuation inhaler Take 1-2 Puffs by inhalation every four (4) hours as needed for Wheezing.     propranoloL (INDERAL) 20 mg tablet Take 1 tab up to twice daily as needed for heart racing    cyclobenzaprine (FLEXERIL) 10 mg tablet Take 1 Tab by mouth three (3) times daily as needed for Muscle Spasm(s).  ondansetron (ZOFRAN ODT) 4 mg disintegrating tablet Take 1 Tab by mouth every eight (8) hours as needed for Nausea or Vomiting.  ergocalciferol (ERGOCALCIFEROL) 1,250 mcg (50,000 unit) capsule Take 1 Cap by mouth every twenty-eight (28) days.  lidocaine (LIDOCAINE PAIN RELIEF) 4 % patch 1 Patch by TransDERmal route every twenty-four (24) hours. (Patient taking differently: 1 Patch by TransDERmal route as needed.)    triamcinolone (NASACORT AQ) 55 mcg nasal inhaler 2 Sprays daily.  EPINEPHrine (EPIPEN 2-TOO) 0.3 mg/0.3 mL injection by IntraMUSCular route.  acetaminophen (TYLENOL) 325 mg tablet Take  by mouth every four (4) hours as needed for Pain.  SOD CHLOR,BICARB/SQUEEZ BOTTLE (NASAL RELIEF SINUS WASH-BOTTLE NA) as needed.  Dexlansoprazole (DEXILANT) 60 mg CpDB Take  by mouth daily.  ipratropium (ATROVENT) 0.02 % nebulizer solution 2.5 mL by Nebulization route as needed for Wheezing. No current facility-administered medications for this visit. Allergies   Allergen Reactions    Black Pepper Hives, Swelling and Cough    Mushroom Combination No.1 Shortness of Breath, Swelling and Cough    Tomato Hives, Swelling and Cough    Adhesive Tape-Silicones Hives     Causes burning at site    [employer] Containing Products Hives    Bovine Cartilage Hives    Dicyclomine Rash     Pt says not allergic to dicyclomine    Dilaudid [Hydromorphone (Bulk)] Itching    Doxycycline Hives    Metoprolol Shortness of Breath     Not acute sob, but was having these recurrences while on the med that stopped once she d/c it.     Gluten Other (comments)     GI upset    Morphine Rash    Tramadol Rash     Tolerates percocet     Review of Systems: PER HPI    Physical Examination:  - GENERAL: NCAT, Appears well nourished   - EYES: EOMI, non-icteric, no proptosis   - Ear/Nose/Throat: NCAT, no visible inflammation or masses   - CARDIOVASCULAR: no cyanosis, no visible JVD   - RESPIRATORY: respiratory effort normal without any distress or labored breathing   - MUSCULOSKELETAL: Normal ROM of neck and upper extremities observed   - SKIN: No rash on face  - NEUROLOGIC:  No facial asymmetry (Cranial nerve 7 motor function), No gaze palsy   - PSYCHIATRIC: Normal affect, Normal insight and judgement       Data Reviewed:   Component      Latest Ref Rng & Units 4/29/2021 4/29/2021          12:00 AM 12:00 AM   T4, Free      0.82 - 1.77 ng/dL  1.44   TSH      0.450 - 4.500 uIU/mL 3.600        Assessment/Plan:     1. Post-ablative hypothyroidism: She had a low TSH of 0.27 in 10/18 but all other values in our system have been normal including her most recent value of 1.8 in 3/20. Her ultrasound in 4/20 showed a 2.8 cm left lobe nodule that had increased in size slightly from 2.6 cm in 12/18. This was biopsied in 1/19 and was benign. She had a thyroid uptake scan that showed 36% uptake with diminished activity in this nodule. Even though this constitutes a cold nodule, the fact that she already had this biopsied and it was benign and has not significantly increased in size from 12/18 to 4/20, I don't feel there is any indication to biopsy this again. The likelihood of thyroid cancer in a gland that has increased uptake is very low so she is agreeable to not having another biopsy especially since her first biopsy was very difficult. We discussed that we have 3 options: 1) watchful waiting with following TSH levels over time to see if she goes on to develop clinical hyperthyroidism from this nodule in the future, 2) surgical removal of the nodule, or 3) treatment with radioactive iodine to see if it will shrink her nodule and overall gland size to help with any mild compressive symptoms she is having.   I did not feel that surgery was indicated at her visit in 4/20 and she didn't want to pursue this route either. She wanted to try a treatment dose of AMARO and received 25.9 mCi on 5/6/20. 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 Her TSH went high at 5.75 and FT4 down to 0.96 in 9/20 but TSH back low at 0.239 in 10/20. TSH up to 0.43 in 2/21 and then up to 8.24 in 3/21 so began levothyroxine 137 mcg daily and TSH 3.6 in 4/21 so increased to 150 mcg daily  - increase levothyroxine to 150 mcg daily  - check TSH and free T4 in 6 weeks and prior to next visit         Patient Instructions   1) TSH is a thyroid test.  Your level is 3.6 which is normal but above goal of 0.45-2.0. This test goes opposite of your thyroid dose and suggests your dose of levothyroxine is not enough. I will increase your dose to 150 mcg daily and have sent a new prescription to your local pharmacy. 2) I will e-mail 2 lab slips for 6 weeks and for 6 months next week. They will come from an e-mail [email or web address] and all the e-mail will have is just an attached image with the PDF. Please confirm you received this and check your junk mail just in case you don't see it in your regular e-mail. Thanks. 3) If you end up going to [facility] for your labs, please let me know so I can be sure that I received the results via fax. 4) Please come for a follow up visit on 11/5/21 at 11:30am in our [place] office. Follow-up and Dispositions    · Return 11/5/21 at 11:30am.               Copy sent to:  Ashely Weber, DO    Lab follow up: 6/27/21  Received labs from Dr. Gianfranco Gu from 6/11/21:  - TSH 4.11  - free T4 1.66  - free T3 2.6  - TPO ab 32  - testosterone 39.9  - prolactin 8.3    Sent her the following message through [employer]:  I received your labs from Dr. Gianfranco Gu from 6/11/21. TSH is a thyroid test.  Your level is 4.11 which is normal but above goal of 0.45-2.0.   This test goes opposite of your thyroid dose and suggests your dose of levothyroxine is possibly not enough or you have missed doses in the past 6 weeks or are not taking it properly. Please confirm if you have missed any doses or not in the past 6 weeks. Are you taking on an empty stomach with just water at least 30 min before food and coffee? Are you taking any vitamins within 4 hours of your pill? Are you on any new acid reflux medications or any other new medications? Let me know the answers to these questions and I'll determine if we need to make a dose change or not. Addendum: 6/29/21    We had the following [employer] exchange:    Ok. If this is the case I think we should increase your levothyroxine to 175 mcg daily and I have sent this to [facility].  I would like to repeat your labs again in 6 weeks. Let me know if you still have the lab slips I had e-mailed to you back in April to have these labs drawn either at hospitals or [facility] doctors.    ===View-only below this line===      ----- Message -----       From:Marina Guerra       Sent:6/29/2021  9:17 AM EDT         To:Maulik Gooden MD    Subject:RE:lab results    I have miss doses but I would take what I missed. It may have been a few days of me not waiting the full thirty minutes before drinking something other than water to get home from work. I'm not sure if I'm waiting long enough after eating lunch at work I usually get a break between 3:30-4:30 and I usually take the levothyroxine around 7am-8am. No new medication and I have not been taking my vitamins.     Addendum: 12/14/21    Component      Latest Ref Rng & Units 11/4/2021 11/4/2021          12:00 AM 12:00 AM   T4, Free      0.82 - 1.77 ng/dL  1.13   TSH      0.450 - 4.500 uIU/mL 7.910 (H)      Sent her the following message through [employer]:  Since you missed doses prior to the lab draw, this caused your TSH to rise to 7.91 so we will keep your dose the same and focus on not missing any doses and repeat your labs again prior to your visit that was rescheduled for 1/10/22 at 12:10pm.

## 2021-05-20 DIAGNOSIS — R10.9 ACUTE RIGHT FLANK PAIN: ICD-10-CM

## 2021-05-20 DIAGNOSIS — R06.02 SHORTNESS OF BREATH: ICD-10-CM

## 2021-05-20 DIAGNOSIS — R07.2 PRECORDIAL PAIN: ICD-10-CM

## 2021-05-20 DIAGNOSIS — R06.09 DYSPNEA ON EXERTION: ICD-10-CM

## 2021-05-21 DIAGNOSIS — R92.8 ABNORMAL MAMMOGRAM OF LEFT BREAST: Primary | ICD-10-CM

## 2021-05-27 DIAGNOSIS — R92.8 ABNORMAL MAMMOGRAM OF LEFT BREAST: ICD-10-CM

## 2021-05-28 DIAGNOSIS — R92.8 ABNORMAL MAMMOGRAM OF LEFT BREAST: ICD-10-CM

## 2021-06-01 NOTE — PROGRESS NOTES
Patient verified by stating name and date of birth.  Patient informed of Mammo results and states understanding per Rodney Reddy

## 2021-06-11 DIAGNOSIS — E89.0 POSTABLATIVE HYPOTHYROIDISM: ICD-10-CM

## 2021-06-15 DIAGNOSIS — Z12.31 SCREENING MAMMOGRAM, ENCOUNTER FOR: ICD-10-CM

## 2021-06-29 ENCOUNTER — HOSPITAL ENCOUNTER (OUTPATIENT)
Dept: ULTRASOUND IMAGING | Age: 41
Discharge: HOME | End: 2021-06-29
Payer: COMMERCIAL

## 2021-06-29 ENCOUNTER — OFFICE VISIT (OUTPATIENT)
Dept: FAMILY MEDICINE CLINIC | Age: 41
End: 2021-06-29
Payer: COMMERCIAL

## 2021-06-29 VITALS
OXYGEN SATURATION: 98 % | SYSTOLIC BLOOD PRESSURE: 145 MMHG | HEIGHT: 64 IN | RESPIRATION RATE: 20 BRPM | TEMPERATURE: 97.5 F | BODY MASS INDEX: 50.02 KG/M2 | WEIGHT: 293 LBS | DIASTOLIC BLOOD PRESSURE: 80 MMHG | HEART RATE: 83 BPM

## 2021-06-29 DIAGNOSIS — E03.4 HYPOTHYROIDISM DUE TO ACQUIRED ATROPHY OF THYROID: ICD-10-CM

## 2021-06-29 DIAGNOSIS — R73.03 PREDIABETES: Primary | ICD-10-CM

## 2021-06-29 DIAGNOSIS — M79.661 RIGHT CALF PAIN: ICD-10-CM

## 2021-06-29 DIAGNOSIS — I10 BENIGN HYPERTENSION: ICD-10-CM

## 2021-06-29 DIAGNOSIS — Z82.69 FAMILY HISTORY OF ANKYLOSING SPONDYLITIS: ICD-10-CM

## 2021-06-29 DIAGNOSIS — M25.551 RIGHT HIP PAIN: ICD-10-CM

## 2021-06-29 LAB
ALBUMIN UR QL STRIP: 10 MG/L
BILIRUB UR QL STRIP: NEGATIVE
CREATININE, URINE POC: 100 MG/DL
GLUCOSE UR-MCNC: NEGATIVE MG/DL
KETONES P FAST UR STRIP-MCNC: NEGATIVE MG/DL
MICROALBUMIN/CREAT RATIO POC: <30 MG/G
PH UR STRIP: 6 [PH] (ref 4.6–8)
PROT UR QL STRIP: NEGATIVE
SP GR UR STRIP: 1.02 (ref 1–1.03)
UA UROBILINOGEN AMB POC: NORMAL (ref 0.2–1)
URINALYSIS CLARITY POC: CLEAR
URINALYSIS COLOR POC: YELLOW
URINE BLOOD POC: NEGATIVE
URINE LEUKOCYTES POC: NEGATIVE
URINE NITRITES POC: NEGATIVE

## 2021-06-29 PROCEDURE — 93971 EXTREMITY STUDY: CPT

## 2021-06-29 PROCEDURE — 81003 URINALYSIS AUTO W/O SCOPE: CPT | Performed by: NURSE PRACTITIONER

## 2021-06-29 PROCEDURE — 82044 UR ALBUMIN SEMIQUANTITATIVE: CPT | Performed by: NURSE PRACTITIONER

## 2021-06-29 PROCEDURE — 99214 OFFICE O/P EST MOD 30 MIN: CPT | Performed by: NURSE PRACTITIONER

## 2021-06-29 RX ORDER — LEVOTHYROXINE SODIUM 175 UG/1
175 TABLET ORAL
Qty: 90 TABLET | Refills: 3 | Status: SHIPPED | OUTPATIENT
Start: 2021-06-29 | End: 2022-08-19 | Stop reason: SDUPTHER

## 2021-06-29 NOTE — PROGRESS NOTES
Learning Assessment 6/29/2021   PRIMARY LEARNER Patient   HIGHEST LEVEL OF EDUCATION - PRIMARY LEARNER  SOME COLLEGE   BARRIERS PRIMARY LEARNER NONE   CO-LEARNER CAREGIVER No   PRIMARY LANGUAGE ENGLISH   LEARNER PREFERENCE PRIMARY READING     -     -   LEARNING SPECIAL TOPICS -   ANSWERED BY Patient    RELATIONSHIP SELF         1. Have you been to the ER, urgent care clinic since your last visit? Hospitalized since your last visit? No    2. Have you seen or consulted any other health care providers outside of the [address] [place] [address] since your last visit? Include any pap smears or colon screening.  No      Results for orders placed or performed in visit on 06/29/21   AMB POC URINALYSIS DIP STICK AUTO W/O MICRO   Result Value Ref Range    Color (UA POC) Yellow Yellow    Clarity (UA POC) Clear Clear    Glucose (UA POC) Negative Negative    Bilirubin (UA POC) Negative Negative    Ketones (UA POC) Negative Negative    Specific gravity (UA POC) 1.025 1.001 - 1.035    Blood (UA POC) Negative Negative    pH (UA POC) 6.0 4.6 - 8.0    Protein (UA POC) Negative Negative    Urobilinogen (UA POC) 0.2 mg/dL 0.2 - 1    Nitrites (UA POC) Negative Negative    Leukocyte esterase (UA POC) Negative Negative   AMB POC URINE, MICROALBUMIN, SEMIQUANT (3 RESULTS)   Result Value Ref Range    ALBUMIN, URINE POC 10 Negative mg/L    CREATININE, URINE  mg/dL    Microalbumin/creat ratio (POC) <30 <30 MG/G

## 2021-06-29 NOTE — PROGRESS NOTES
Chief Complaint   Patient presents with    Referral Follow Up     GYN    Abnormal Lab Results    Hip Pain     Right Side. HPI:     is a 37 y.o. female who presents today for an acute visit to follow up abnormal labs. She is a nurse at [employer], works night shift on a general floor. She has six children and two grandchildren. HTN: On Chlorthalidone daily. She was seeing Dr. Tory Lopez but hasn't seen him since 2019. R hip pain: Chronic. Has completed PT. No relief with lidocaine patches, Toradol, Tylenol, muscle relaxers. She had an ortho appt last week but had to re-schedule it. Hypothyroidism: Had a single benign thyroid nodule, biopsy done in January 2019. She received a radioactive iodine tx x 2 doses. Now on levothyroxine. Follows with Dr. Richi Saleem, endocrinology. Prediabetes: A1c 6.3% in March 2021. New issues: Pt seen by GYN, Dr. Ortiz November. Random glucose was 146 and she was advised to follow up with PCP. Reviewed with her results from March 2021. She also reports chronic R hip pain and notes R sided calf pain, worse with dorsiflexion of her R foot. The calf is swollen and tender. She is worried about a blood clot. No estrogen use, non-smoker. Allergies   Allergen Reactions    Black Pepper Hives, Swelling and Cough    Mushroom Combination No.1 Shortness of Breath, Swelling and Cough    Tomato Hives, Swelling and Cough    Adhesive Tape-Silicones Hives     Causes burning at site    [employer] Containing Products Hives    Bovine Cartilage Hives    Dicyclomine Rash     Pt says not allergic to dicyclomine    Dilaudid [Hydromorphone (Bulk)] Itching    Doxycycline Hives    Metoprolol Shortness of Breath     Not acute sob, but was having these recurrences while on the med that stopped once she d/c it.     Gluten Other (comments)     GI upset    Morphine Rash    Tramadol Rash     Tolerates percocet       Current Outpatient Medications   Medication Sig    multivitamin (ONE A DAY) tablet Take 1 Tab by mouth daily.  cyanocobalamin (Vitamin B-12) 1,000 mcg tablet Take 1,000 mcg by mouth daily.  levothyroxine (SYNTHROID) 150 mcg tablet Take 1 Tab by mouth Daily (before breakfast). Delete 137 mcg dose from profile    chlorthalidone (HYGROTON) 25 mg tablet Take 1 Tab by mouth daily. Indications: visible water retention, high blood pressure    SUMAtriptan (IMITREX) 20 mg/actuation nasal spray Use in the nostril opposite to the headache    albuterol-ipratropium (DUO-NEB) 2.5 mg-0.5 mg/3 ml nebu 3 mL by Nebulization route every six (6) hours as needed (sob, wheee).  albuterol (PROVENTIL HFA, VENTOLIN HFA, PROAIR HFA) 90 mcg/actuation inhaler Take 1-2 Puffs by inhalation every four (4) hours as needed for Wheezing.  propranoloL (INDERAL) 20 mg tablet Take 1 tab up to twice daily as needed for heart racing    cyclobenzaprine (FLEXERIL) 10 mg tablet Take 1 Tab by mouth three (3) times daily as needed for Muscle Spasm(s).  ergocalciferol (ERGOCALCIFEROL) 1,250 mcg (50,000 unit) capsule Take 1 Cap by mouth every twenty-eight (28) days.  lidocaine (LIDOCAINE PAIN RELIEF) 4 % patch 1 Patch by TransDERmal route every twenty-four (24) hours. (Patient taking differently: 1 Patch by TransDERmal route as needed.)    EPINEPHrine (EPIPEN 2-TOO) 0.3 mg/0.3 mL injection by IntraMUSCular route.  acetaminophen (TYLENOL) 325 mg tablet Take  by mouth every four (4) hours as needed for Pain.  Dexlansoprazole (DEXILANT) 60 mg CpDB Take  by mouth daily.  ipratropium (ATROVENT) 0.02 % nebulizer solution 2.5 mL by Nebulization route as needed for Wheezing. No current facility-administered medications for this visit.        Past Medical History:   Diagnosis Date    Anemia NEC     Arthritis     Asthma     Bell's palsy 04/2018    Chronic pain     fibromyalgia    Colitis     GERD (gastroesophageal reflux disease)     Hypertension     diastolic    Ill-defined condition ACL repair on L leg     Other unknown and unspecified cause of morbidity or mortality     fibromyalgia    Other unknown and unspecified cause of morbidity or mortality     obesity    Pleural effusion associated with pulmonary infection     Pre-diabetes 04/17/2018    Psychiatric problem     anxiety    PUD (peptic ulcer disease)     Thyroid disease during pregnancy     hyperthyroid    Toxic thyroid nodule     s/p AMARO with 25 mCi on 5/6/20       Family History   Problem Relation Age of Onset    Diabetes Father     Heart Attack Father         62 smoker    Hypertension Father     Asthma Father     High Cholesterol Father     Stroke Father     Rashes/Skin Problems Father         eczema    Heart Disease Father     Hypertension Mother     Asthma Mother     Hypertension Maternal Grandmother     Stroke Maternal Grandmother     Heart Disease Maternal Grandfather     Diabetes Paternal Grandmother     Dementia Paternal Grandfather     Diabetes Brother     Diabetes Sister     Other Sister         ankylosing spondylitis    Hypertension Sister     Other Sister         ankylosing spondylitis    Hypertension Brother     Asthma Daughter     Asthma Son     Asthma Son     Asthma Son     Asthma Son     Asthma Son        ROS:  Denies fever, chills, cough, chest pain, SOB,  nausea, vomiting, diarrhea, dysuria. Denies rashes, wounds, + arthralgias, weakness, numbness, visual changes, depression. Denies wt loss, + wt gain, hemoptysis, hematochezia or melena. Patient is not experiencing chest pain radiating to the jaw and/or down the arms.     Physical Examination:    BP (!) 145/80 (BP 1 Location: Right upper arm, BP Patient Position: At rest, BP Cuff Size: Large adult)   Pulse 83   Temp 97.5 °F (36.4 °C) (Core)   Resp 20   Ht 5' 4\" (1.626 m)   Wt 301 lb 6.4 oz (136.7 kg)   SpO2 98%   BMI 51.74 kg/m²     Wt Readings from Last 3 Encounters:   06/29/21 301 lb 6.4 oz (136.7 kg)   03/19/21 293 lb 3.2 oz (133 kg)   04/01/20 289 lb 6.4 oz (131.3 kg)     Constitutional: WDWN Female in no acute distress  HENT:  NC/AT  EYES: EOMI, PERRL  Neck:  Supple, no JVD, mass or bruit. No thyromegaly. Respiratory:  Respirations even and unlabored without use of accessory muscles, CTA throughout without wheezes, rales, rubs or rhonchi. Symmetrical chest expansion. Cardiac: RRR   Musculoskeletal:  No cyanosis, clubbing or edema of extremities. Tenderness with palpation of posterior R calf, no palpable nodule, no visible skin changes. Neurologic:  Smooth, even gait without assistance, CN 2-12 grossly intact. Skin: intact and warm to the touch, no rash   Lymphadenopathy: no cervical or supraclavicular nodes  Psych: Pleasant and appropriate. Judgment normal. Alert and oriented x 3. ASSESSMENT AND PLAN:       ICD-10-CM ICD-9-CM    1. Prediabetes  R73.03 790.29 AMB POC URINALYSIS DIP STICK AUTO W/O MICRO      AMB POC URINE, MICROALBUMIN, SEMIQUANT (3 RESULTS)      COLLECTION VENOUS BLOOD,VENIPUNCTURE      HEMOGLOBIN A1C WITH EAG   2. Benign hypertension  I10 401.1    3. Hypothyroidism due to acquired atrophy of thyroid  E03.4 244.8      246.8    4. Right calf pain  M79.661 729.5 DUPLEX LOWER EXT VENOUS RIGHT   5. Family history of ankylosing spondylitis  Z82.69 V17.89 COLLECTION VENOUS BLOOD,VENIPUNCTURE      C REACTIVE PROTEIN, QT      SED RATE (ESR)      [id number]   6. Right hip pain  M25.551 719.45 COLLECTION VENOUS BLOOD,VENIPUNCTURE      C REACTIVE PROTEIN, QT      SED RATE (ESR)      [id number]      RHEUMATOID FACTOR, QT     Labs ordered as above. Pt declines a lab draw today. Discussed her last A1c result and her results from Dr. Tuyet Avendaño. Work hard on weight loss, low sugar diet, and increased physical activity. Consider metformin if future A1c remains in the prediabetic range as this may help her lose weight in addition to control her sugars. Discussed R hip pain at length. She notes a family hx of ankylosing spondylitis. Check an HLA-B27. She will self-refer back to ortho. Patient aware of plan of care and verbalized understanding. Questions answered. RTC for labs in the next few weeks, or sooner if needed.     Taya Garcia NP

## 2021-06-29 NOTE — PATIENT INSTRUCTIONS
Hip Pain: Care Instructions  Your Care Instructions     Hip pain may be caused by many things, including overuse, a fall, or a twisting movement. Another cause of hip pain is arthritis. Your pain may increase when you stand up, walk, or squat. The pain may come and go or may be constant. Home treatment can help relieve hip pain, swelling, and stiffness. If your pain is ongoing, you may need more tests and treatment. Follow-up care is a key part of your treatment and safety. Be sure to make and go to all appointments, and call your doctor if you are having problems. It's also a good idea to know your test results and keep a list of the medicines you take. How can you care for yourself at home? · Take pain medicines exactly as directed. ? If the doctor gave you a prescription medicine for pain, take it as prescribed. ? If you are not taking a prescription pain medicine, ask your doctor if you can take an over-the-counter medicine. · Rest and protect your hip. Take a break from any activity, including standing or walking, that may cause pain. · Put ice or a cold pack against your hip for 10 to 20 minutes at a time. Try to do this every 1 to 2 hours for the next 3 days (when you are awake) or until the swelling goes down. Put a thin cloth between the ice and your skin. · Sleep on your healthy side with a pillow between your knees, or sleep on your back with pillows under your knees. · If there is no swelling, you can put moist heat, a heating pad, or a warm cloth on your hip. Do gentle stretching exercises to help keep your hip flexible. · Learn how to prevent falls. Have your vision and hearing checked regularly. Wear slippers or shoes with a nonskid sole. · Stay at a healthy weight. · Wear comfortable shoes. When should you call for help? Call 911 anytime you think you may need emergency care.  For example, call if:    · You have sudden chest pain and shortness of breath, or you cough up blood.     · You are not able to stand or walk or bear weight.     · Your buttocks, legs, or feet feel numb or tingly.     · Your leg or foot is cool or pale or changes color.     · You have severe pain. Call your doctor now or seek immediate medical care if:    · You have signs of infection, such as:  ? Increased pain, swelling, warmth, or redness in the hip area. ? Red streaks leading from the hip area. ? Pus draining from the hip area. ? A fever.     · You have signs of a blood clot, such as:  ? Pain in your calf, back of the knee, thigh, or groin. ? Redness and swelling in your leg or groin.     · You are not able to bend, straighten, or move your leg normally.     · You have trouble urinating or having bowel movements. Watch closely for changes in your health, and be sure to contact your doctor if:    · You do not get better as expected. Where can you learn more? Go to http://www.gray.com/  Enter Z720 in the search box to learn more about \"Hip Pain: Care Instructions. \"  Current as of: February 26, 2020               Content Version: 12.8  © 0285-6844 [employer] Care instructions adapted under license by [employer] (which disclaims liability or warranty for this information). If you have questions about a medical condition or this instruction, always ask your healthcare professional. [place] [address] any warranty or liability for your use of this information.

## 2021-10-22 DIAGNOSIS — E89.0 POSTABLATIVE HYPOTHYROIDISM: ICD-10-CM

## 2021-11-04 ENCOUNTER — TRANSCRIBE ORDER (OUTPATIENT)
Dept: REGISTRATION | Age: 41
End: 2021-11-04

## 2021-11-04 ENCOUNTER — HOSPITAL ENCOUNTER (OUTPATIENT)
Dept: LAB | Age: 41
Discharge: HOME | End: 2021-11-04

## 2021-11-04 ENCOUNTER — HOSPITAL ENCOUNTER (OUTPATIENT)
Dept: GENERAL RADIOLOGY | Age: 41
Discharge: HOME | End: 2021-11-04
Payer: COMMERCIAL

## 2021-11-04 DIAGNOSIS — M25.559 PAIN IN HIP JOINT: Primary | ICD-10-CM

## 2021-11-04 DIAGNOSIS — M25.559 PAIN IN HIP JOINT: ICD-10-CM

## 2021-11-04 PROCEDURE — 73502 X-RAY EXAM HIP UNI 2-3 VIEWS: CPT

## 2021-11-05 LAB
COMMENT, HOLDF: NORMAL
CRP SERPL-MCNC: 0.93 MG/DL (ref 0–0.6)
ERYTHROCYTE [SEDIMENTATION RATE] IN BLOOD: 35 MM/HR (ref 0–20)
EST. AVERAGE GLUCOSE BLD GHB EST-MCNC: 134 MG/DL
HBA1C MFR BLD: 6.3 % (ref 4–5.6)
RHEUMATOID FACT SERPL-ACNC: 12 IU/ML
SAMPLES BEING HELD,HOLD: NORMAL
T4 FREE SERPL-MCNC: 1.13 NG/DL (ref 0.82–1.77)
TSH SERPL DL<=0.005 MIU/L-ACNC: 7.91 UIU/ML (ref 0.45–4.5)

## 2021-11-08 ENCOUNTER — TRANSCRIBE ORDER (OUTPATIENT)
Dept: REGISTRATION | Age: 41
End: 2021-11-08

## 2021-11-08 ENCOUNTER — HOSPITAL ENCOUNTER (OUTPATIENT)
Dept: GENERAL RADIOLOGY | Age: 41
Discharge: HOME | End: 2021-11-08
Payer: COMMERCIAL

## 2021-11-08 DIAGNOSIS — R10.9 ABDOMINAL PAIN: ICD-10-CM

## 2021-11-08 DIAGNOSIS — R10.9 ABDOMINAL PAIN: Primary | ICD-10-CM

## 2021-11-08 PROCEDURE — 74018 RADEX ABDOMEN 1 VIEW: CPT

## 2021-11-08 PROCEDURE — 74019 RADEX ABDOMEN 2 VIEWS: CPT

## 2021-11-11 LAB — HLA-B27 QL NAA+PROBE: NEGATIVE

## 2021-12-27 DIAGNOSIS — E89.0 POSTABLATIVE HYPOTHYROIDISM: ICD-10-CM

## 2022-03-18 PROBLEM — E03.4 HYPOTHYROIDISM DUE TO ACQUIRED ATROPHY OF THYROID: Status: ACTIVE | Noted: 2017-08-07

## 2022-03-18 PROBLEM — E11.42 DIABETIC PERIPHERAL NEUROPATHY ASSOCIATED WITH TYPE 2 DIABETES MELLITUS (HCC): Status: ACTIVE | Noted: 2017-08-07

## 2022-03-18 PROBLEM — G44.209 TENSION VASCULAR HEADACHE: Status: ACTIVE | Noted: 2017-08-07

## 2022-03-18 PROBLEM — G56.03 BILATERAL CARPAL TUNNEL SYNDROME: Status: ACTIVE | Noted: 2017-08-07

## 2022-03-18 PROBLEM — G43.109 MIGRAINE WITH AURA AND WITHOUT STATUS MIGRAINOSUS, NOT INTRACTABLE: Status: ACTIVE | Noted: 2017-08-07

## 2022-03-19 PROBLEM — E55.9 VITAMIN D DEFICIENCY: Status: ACTIVE | Noted: 2017-08-07

## 2022-03-19 PROBLEM — R94.6 ABNORMAL THYROID FUNCTION TEST: Status: ACTIVE | Noted: 2019-01-10

## 2022-03-19 PROBLEM — E04.9 GOITER: Status: ACTIVE | Noted: 2018-12-12

## 2022-03-19 PROBLEM — G60.8 IDIOPATHIC SMALL AND LARGE FIBER SENSORY NEUROPATHY: Status: ACTIVE | Noted: 2017-08-07

## 2022-03-19 PROBLEM — H52.03 HYPERMETROPIA OF BOTH EYES: Status: ACTIVE | Noted: 2018-05-14

## 2022-03-19 PROBLEM — M54.16 LUMBAR BACK PAIN WITH RADICULOPATHY AFFECTING LEFT LOWER EXTREMITY: Status: ACTIVE | Noted: 2017-10-25

## 2022-03-19 PROBLEM — E66.01 OBESITY, MORBID (HCC): Status: ACTIVE | Noted: 2018-03-08

## 2022-03-19 PROBLEM — K21.9 GERD (GASTROESOPHAGEAL REFLUX DISEASE): Status: ACTIVE | Noted: 2018-12-14

## 2022-03-19 PROBLEM — M26.609 TMJ (TEMPOROMANDIBULAR JOINT SYNDROME): Status: ACTIVE | Noted: 2017-08-07

## 2022-03-19 PROBLEM — F51.05 INSOMNIA DUE TO OTHER MENTAL DISORDER: Status: ACTIVE | Noted: 2019-08-01

## 2022-03-19 PROBLEM — M47.22 CERVICAL RADICULOPATHY DUE TO DEGENERATIVE JOINT DISEASE OF SPINE: Status: ACTIVE | Noted: 2017-10-25

## 2022-03-19 PROBLEM — E53.8 B12 DEFICIENCY: Status: ACTIVE | Noted: 2017-08-07

## 2022-03-19 PROBLEM — F99 INSOMNIA DUE TO OTHER MENTAL DISORDER: Status: ACTIVE | Noted: 2019-08-01

## 2022-03-19 PROBLEM — G25.81 RESTLESS LEG SYNDROME: Status: ACTIVE | Noted: 2017-05-22

## 2022-03-19 PROBLEM — G47.30 SLEEP APNEA: Status: ACTIVE | Noted: 2019-03-14

## 2022-03-19 PROBLEM — M54.16 LUMBAR BACK PAIN WITH RADICULOPATHY AFFECTING RIGHT LOWER EXTREMITY: Status: ACTIVE | Noted: 2017-10-25

## 2023-06-01 ENCOUNTER — TELEMEDICINE (OUTPATIENT)
Age: 43
End: 2023-06-01
Payer: COMMERCIAL

## 2023-06-01 DIAGNOSIS — E89.0 POSTABLATIVE HYPOTHYROIDISM: Primary | ICD-10-CM

## 2023-06-01 PROCEDURE — 99214 OFFICE O/P EST MOD 30 MIN: CPT | Performed by: INTERNAL MEDICINE

## 2023-06-01 RX ORDER — SEMAGLUTIDE 0.68 MG/ML
INJECTION, SOLUTION SUBCUTANEOUS
COMMUNITY
Start: 2023-05-25

## 2023-06-01 RX ORDER — LEVOTHYROXINE SODIUM 0.2 MG/1
200 TABLET ORAL DAILY
COMMUNITY

## 2023-06-01 RX ORDER — AMITRIPTYLINE HYDROCHLORIDE 50 MG/1
TABLET, FILM COATED ORAL
COMMUNITY
Start: 2023-05-17

## 2023-06-01 RX ORDER — TIZANIDINE 2 MG/1
2 TABLET ORAL EVERY 8 HOURS PRN
COMMUNITY
Start: 2023-05-17

## 2023-06-01 NOTE — PATIENT INSTRUCTIONS
1) Your TSH (thyroid test) was high at 9.33 and above goal of 0.45-2.0 due to not taking any thyroid hormone during the month of April. Please do your best to get your levothyroxine everyday and if you miss any doses then please double up the next day. 2) I put an order directly into the [employer] system to repeat your labs in 1 month and in the 1-2 weeks prior to your next visit so just ask for the order under my name and make a note on your calendar to have these done at these times. These orders were also put directly into the [employer] portal.  Go under menu and my record and scroll down to upcoming tests and procedures and you are welcome to print these off or bring a copy of the orders using the [employer] craig on your phone to the lab. Thanks! 3) Please come for a follow up visit on 12/4/23 at 11:30am in our [facility] office.

## 2023-06-01 NOTE — PROGRESS NOTES
Chief Complaint   Patient presents with    Thyroid Problem     MyChart pt instructed to wait for the link    Other     PCP and pharmacy confirmed        **THIS IS A VIRTUAL VISIT VIA A VIDEO SYNCHRONOUS DISCUSSION. PATIENT AGREED TO HAVE THEIR CARE DELIVERED OVER A [employer] VIDEO VISIT IN PLACE OF THEIR REGULARLY SCHEDULED OFFICE VISIT**    History of Present Illness: Raymond Dorado is a 44 y.o. female here for follow up of thyroid. I have not seen her since last virtual visit in 4/21. Since then her PCP has been managing her thyroid and increased her dose of levothyroxine from 175 to 200 mcg sometime in the past 6-8 months. She admits to continuing to miss doses frequently and even saw a [facility] doctor in April who told her she can take supplements instead of thyroid hormone and wasn't taking any levothyroxine during the month of April and May until her labs came back with a TSH of 9.33 on 5/12/23 at her new patient visit with Dr. Papa Howe and since then has been trying to take everyday and if she misses doses will double up the next day. I advised her to keep her dose the same and not miss any doses during the month of June and then repeat her labs in July and we'll see if a dose change is needed. Current Outpatient Medications   Medication Sig    levothyroxine (SYNTHROID) 200 MCG tablet Take 1 tablet by mouth Daily    OZEMPIC, 0.25 OR 0.5 MG/DOSE, 2 MG/3ML SOPN INJECT 0.25 MG EVERY WEEK BY SUBCUTANEOUS ROUTE FOR 28 DAYS.     tiZANidine (ZANAFLEX) 2 MG tablet Take 1 tablet by mouth every 8 hours as needed    amitriptyline (ELAVIL) 50 MG tablet TAKE 1 TABLET BY MOUTH EVERYDAY AT BEDTIME    acetaminophen (TYLENOL) 325 MG tablet Take by mouth every 4 hours as needed    albuterol sulfate HFA (PROVENTIL;VENTOLIN;PROAIR) 108 (90 Base) MCG/ACT inhaler Inhale 1-2 puffs into the lungs every 4 hours as needed    chlorthalidone (HYGROTON) 25 MG tablet Take 1 tablet by mouth daily    cyanocobalamin 1000 MCG

## 2023-07-01 DIAGNOSIS — E89.0 POSTABLATIVE HYPOTHYROIDISM: ICD-10-CM

## 2023-10-25 ENCOUNTER — OFFICE VISIT (OUTPATIENT)
Age: 43
End: 2023-10-25
Payer: COMMERCIAL

## 2023-10-25 VITALS
OXYGEN SATURATION: 99 % | HEIGHT: 64 IN | HEART RATE: 80 BPM | WEIGHT: 292 LBS | DIASTOLIC BLOOD PRESSURE: 110 MMHG | RESPIRATION RATE: 20 BRPM | BODY MASS INDEX: 49.85 KG/M2 | SYSTOLIC BLOOD PRESSURE: 160 MMHG | TEMPERATURE: 98.9 F

## 2023-10-25 DIAGNOSIS — Z01.810 PREOPERATIVE CARDIOVASCULAR EXAMINATION: Primary | ICD-10-CM

## 2023-10-25 DIAGNOSIS — I10 PRIMARY HYPERTENSION: ICD-10-CM

## 2023-10-25 DIAGNOSIS — R07.89 ATYPICAL CHEST PAIN: ICD-10-CM

## 2023-10-25 PROCEDURE — 93000 ELECTROCARDIOGRAM COMPLETE: CPT | Performed by: INTERNAL MEDICINE

## 2023-10-25 PROCEDURE — 99204 OFFICE O/P NEW MOD 45 MIN: CPT | Performed by: INTERNAL MEDICINE

## 2023-10-25 PROCEDURE — 3077F SYST BP >= 140 MM HG: CPT | Performed by: INTERNAL MEDICINE

## 2023-10-25 PROCEDURE — 3080F DIAST BP >= 90 MM HG: CPT | Performed by: INTERNAL MEDICINE

## 2023-10-25 RX ORDER — FENOFIBRATE 54 MG/1
54 TABLET ORAL DAILY
Qty: 30 TABLET | Refills: 11 | COMMUNITY
Start: 2023-07-07 | End: 2024-07-06

## 2023-10-25 RX ORDER — AMLODIPINE AND OLMESARTAN MEDOXOMIL 5; 20 MG/1; MG/1
1 TABLET ORAL DAILY
COMMUNITY
Start: 2023-07-07

## 2023-11-19 DIAGNOSIS — E89.0 POSTABLATIVE HYPOTHYROIDISM: ICD-10-CM
